# Patient Record
Sex: FEMALE | Race: WHITE | NOT HISPANIC OR LATINO | Employment: OTHER | URBAN - METROPOLITAN AREA
[De-identification: names, ages, dates, MRNs, and addresses within clinical notes are randomized per-mention and may not be internally consistent; named-entity substitution may affect disease eponyms.]

---

## 2017-04-20 ENCOUNTER — HISTORICAL (OUTPATIENT)
Dept: LAB | Facility: HOSPITAL | Age: 82
End: 2017-04-20

## 2017-04-26 ENCOUNTER — HISTORICAL (OUTPATIENT)
Dept: ADMINISTRATIVE | Facility: HOSPITAL | Age: 82
End: 2017-04-26

## 2017-07-17 ENCOUNTER — HISTORICAL (OUTPATIENT)
Dept: ADMINISTRATIVE | Facility: HOSPITAL | Age: 82
End: 2017-07-17

## 2017-07-17 LAB
ABS NEUT (OLG): 3.22 X10(3)/MCL (ref 2.1–9.2)
ALBUMIN SERPL-MCNC: 3.5 GM/DL (ref 3.4–5)
ALBUMIN/GLOB SERPL: 0.9 {RATIO}
ALP SERPL-CCNC: 102 UNIT/L (ref 38–126)
ALT SERPL-CCNC: 22 UNIT/L (ref 12–78)
AST SERPL-CCNC: 19 UNIT/L (ref 15–37)
BASOPHILS # BLD AUTO: 0.1 X10(3)/MCL (ref 0–0.2)
BASOPHILS NFR BLD AUTO: 1.1 %
BILIRUB SERPL-MCNC: 2 MG/DL (ref 0.2–1)
BILIRUBIN DIRECT+TOT PNL SERPL-MCNC: 0.4 MG/DL (ref 0–0.2)
BILIRUBIN DIRECT+TOT PNL SERPL-MCNC: 1.6 MG/DL (ref 0–0.8)
BUN SERPL-MCNC: 10 MG/DL (ref 7–18)
CALCIUM SERPL-MCNC: 10.1 MG/DL (ref 8.5–10.1)
CHLORIDE SERPL-SCNC: 107 MMOL/L (ref 98–107)
CO2 SERPL-SCNC: 29 MMOL/L (ref 21–32)
CREAT SERPL-MCNC: 0.8 MG/DL (ref 0.55–1.02)
EOSINOPHIL # BLD AUTO: 0.2 X10(3)/MCL (ref 0–0.9)
EOSINOPHIL NFR BLD AUTO: 4.4 %
ERYTHROCYTE [DISTWIDTH] IN BLOOD BY AUTOMATED COUNT: 14 % (ref 11.5–17)
GLOBULIN SER-MCNC: 4.1 GM/DL (ref 2.4–3.5)
GLUCOSE SERPL-MCNC: 106 MG/DL (ref 74–106)
HCT VFR BLD AUTO: 43.5 % (ref 37–47)
HGB BLD-MCNC: 14.4 GM/DL (ref 12–16)
LYMPHOCYTES # BLD AUTO: 1.4 X10(3)/MCL (ref 0.6–4.6)
LYMPHOCYTES NFR BLD AUTO: 25 %
MCH RBC QN AUTO: 30.8 PG (ref 27–31)
MCHC RBC AUTO-ENTMCNC: 33.1 GM/DL (ref 33–36)
MCV RBC AUTO: 93.1 FL (ref 80–94)
MONOCYTES # BLD AUTO: 0.5 X10(3)/MCL (ref 0.1–1.3)
MONOCYTES NFR BLD AUTO: 9.8 %
NEUTROPHILS # BLD AUTO: 3.2 X10(3)/MCL (ref 2.1–9.2)
NEUTROPHILS NFR BLD AUTO: 59.7 %
PLATELET # BLD AUTO: 182 X10(3)/MCL (ref 130–400)
PMV BLD AUTO: 10 FL (ref 9.4–12.4)
POTASSIUM SERPL-SCNC: 3.8 MMOL/L (ref 3.5–5.1)
PROT SERPL-MCNC: 7.6 GM/DL (ref 6.4–8.2)
RBC # BLD AUTO: 4.67 X10(6)/MCL (ref 4.2–5.4)
SODIUM SERPL-SCNC: 144 MMOL/L (ref 136–145)
WBC # SPEC AUTO: 5.4 X10(3)/MCL (ref 4.5–11.5)

## 2017-10-24 ENCOUNTER — HISTORICAL (OUTPATIENT)
Dept: ADMINISTRATIVE | Facility: HOSPITAL | Age: 82
End: 2017-10-24

## 2017-10-24 LAB
ABS NEUT (OLG): 2.97 X10(3)/MCL (ref 2.1–9.2)
ALBUMIN SERPL-MCNC: 3.7 GM/DL (ref 3.4–5)
ALBUMIN/GLOB SERPL: 0.9 RATIO (ref 1.1–2)
ALP SERPL-CCNC: 103 UNIT/L (ref 38–126)
ALT SERPL-CCNC: 20 UNIT/L (ref 12–78)
AST SERPL-CCNC: 23 UNIT/L (ref 15–37)
BASOPHILS # BLD AUTO: 0 X10(3)/MCL (ref 0–0.2)
BASOPHILS NFR BLD AUTO: 0.6 %
BILIRUB SERPL-MCNC: 2.2 MG/DL (ref 0.2–1)
BILIRUBIN DIRECT+TOT PNL SERPL-MCNC: 0.4 MG/DL (ref 0–0.5)
BILIRUBIN DIRECT+TOT PNL SERPL-MCNC: 1.8 MG/DL (ref 0–0.8)
BUN SERPL-MCNC: 12 MG/DL (ref 7–18)
CALCIUM SERPL-MCNC: 10.4 MG/DL (ref 8.5–10.1)
CHLORIDE SERPL-SCNC: 105 MMOL/L (ref 98–107)
CO2 SERPL-SCNC: 33 MMOL/L (ref 21–32)
CREAT SERPL-MCNC: 0.75 MG/DL (ref 0.55–1.02)
EOSINOPHIL # BLD AUTO: 0.2 X10(3)/MCL (ref 0–0.9)
EOSINOPHIL NFR BLD AUTO: 3.6 %
ERYTHROCYTE [DISTWIDTH] IN BLOOD BY AUTOMATED COUNT: 13.8 % (ref 11.5–17)
GLOBULIN SER-MCNC: 4 GM/DL (ref 2.4–3.5)
GLUCOSE SERPL-MCNC: 93 MG/DL (ref 74–106)
HCT VFR BLD AUTO: 43.5 % (ref 37–47)
HGB BLD-MCNC: 14.8 GM/DL (ref 12–16)
LYMPHOCYTES # BLD AUTO: 1.6 X10(3)/MCL (ref 0.6–4.6)
LYMPHOCYTES NFR BLD AUTO: 29.4 %
MCH RBC QN AUTO: 31.2 PG (ref 27–31)
MCHC RBC AUTO-ENTMCNC: 34 GM/DL (ref 33–36)
MCV RBC AUTO: 91.8 FL (ref 80–94)
MONOCYTES # BLD AUTO: 0.5 X10(3)/MCL (ref 0.1–1.3)
MONOCYTES NFR BLD AUTO: 10.2 %
NEUTROPHILS # BLD AUTO: 3 X10(3)/MCL (ref 2.1–9.2)
NEUTROPHILS NFR BLD AUTO: 56.2 %
PLATELET # BLD AUTO: 186 X10(3)/MCL (ref 130–400)
PMV BLD AUTO: 10.2 FL (ref 9.4–12.4)
POTASSIUM SERPL-SCNC: 3.8 MMOL/L (ref 3.5–5.1)
PROT SERPL-MCNC: 7.7 GM/DL (ref 6.4–8.2)
RBC # BLD AUTO: 4.74 X10(6)/MCL (ref 4.2–5.4)
SODIUM SERPL-SCNC: 142 MMOL/L (ref 136–145)
WBC # SPEC AUTO: 5.3 X10(3)/MCL (ref 4.5–11.5)

## 2017-11-06 ENCOUNTER — HISTORICAL (OUTPATIENT)
Dept: INFUSION THERAPY | Facility: HOSPITAL | Age: 82
End: 2017-11-06

## 2018-01-24 ENCOUNTER — HISTORICAL (OUTPATIENT)
Dept: ADMINISTRATIVE | Facility: HOSPITAL | Age: 83
End: 2018-01-24

## 2018-01-24 LAB
ABS NEUT (OLG): 2.24 X10(3)/MCL (ref 2.1–9.2)
ALBUMIN SERPL-MCNC: 3.3 GM/DL (ref 3.4–5)
ALP SERPL-CCNC: 76 UNIT/L (ref 38–126)
ALT SERPL-CCNC: 18 UNIT/L (ref 12–78)
ANION GAP SERPL CALC-SCNC: 17 MMOL/L
AST SERPL-CCNC: 22 UNIT/L (ref 15–37)
BASOPHILS # BLD AUTO: 0 X10(3)/MCL (ref 0–0.2)
BASOPHILS NFR BLD AUTO: 0.5 %
BILIRUB SERPL-MCNC: 1.9 MG/DL (ref 0.2–1)
BILIRUBIN DIRECT+TOT PNL SERPL-MCNC: 0.4 MG/DL (ref 0–0.2)
BILIRUBIN DIRECT+TOT PNL SERPL-MCNC: 1.5 MG/DL (ref 0–0.8)
BUN SERPL-MCNC: 10 MG/DL (ref 7–18)
CHLORIDE SERPL-SCNC: 103 MMOL/L (ref 98–109)
CREAT SERPL-MCNC: 1 MG/DL (ref 0.6–1.3)
EOSINOPHIL # BLD AUTO: 0.2 X10(3)/MCL (ref 0–0.9)
EOSINOPHIL NFR BLD AUTO: 5 %
ERYTHROCYTE [DISTWIDTH] IN BLOOD BY AUTOMATED COUNT: 13.5 % (ref 11.5–17)
GLUCOSE SERPL-MCNC: 103 MG/DL (ref 70–105)
HCT VFR BLD AUTO: 40.6 % (ref 37–47)
HCT VFR BLD CALC: 37 % (ref 38–51)
HGB BLD-MCNC: 12.6 MG/DL (ref 12–17)
HGB BLD-MCNC: 13.5 GM/DL (ref 12–16)
LIVER PROFILE INTERP: ABNORMAL
LYMPHOCYTES # BLD AUTO: 1.3 X10(3)/MCL (ref 0.6–4.6)
LYMPHOCYTES NFR BLD AUTO: 30.4 %
MCH RBC QN AUTO: 31.1 PG (ref 27–31)
MCHC RBC AUTO-ENTMCNC: 33.3 GM/DL (ref 33–36)
MCV RBC AUTO: 93.5 FL (ref 80–94)
MONOCYTES # BLD AUTO: 0.6 X10(3)/MCL (ref 0.1–1.3)
MONOCYTES NFR BLD AUTO: 13 %
NEUTROPHILS # BLD AUTO: 2.2 X10(3)/MCL (ref 2.1–9.2)
NEUTROPHILS NFR BLD AUTO: 51.1 %
PLATELET # BLD AUTO: 182 X10(3)/MCL (ref 130–400)
PMV BLD AUTO: 9.9 FL (ref 9.4–12.4)
POC IONIZED CALCIUM: 1.41 MMOL/L (ref 1.12–1.32)
POC TCO2: 31 MMOL/L (ref 22–27)
POTASSIUM BLD-SCNC: 3.7 MMOL/L (ref 3.5–4.9)
PROT SERPL-MCNC: 7.2 GM/DL (ref 6.4–8.2)
RBC # BLD AUTO: 4.34 X10(6)/MCL (ref 4.2–5.4)
SODIUM BLD-SCNC: 146 MMOL/L (ref 138–146)
WBC # SPEC AUTO: 4.4 X10(3)/MCL (ref 4.5–11.5)

## 2018-04-24 ENCOUNTER — HISTORICAL (OUTPATIENT)
Dept: ADMINISTRATIVE | Facility: HOSPITAL | Age: 83
End: 2018-04-24

## 2018-04-24 LAB
ABS NEUT (OLG): 2.04 X10(3)/MCL (ref 2.1–9.2)
ALBUMIN SERPL-MCNC: 3.5 GM/DL (ref 3.4–5)
ALBUMIN/GLOB SERPL: 0.9 {RATIO}
ALP SERPL-CCNC: 92 UNIT/L (ref 38–126)
ALT SERPL-CCNC: 25 UNIT/L (ref 12–78)
AST SERPL-CCNC: 36 UNIT/L (ref 15–37)
BASOPHILS # BLD AUTO: 0 X10(3)/MCL (ref 0–0.2)
BASOPHILS NFR BLD AUTO: 1.3 %
BASOPHILS NFR BLD MANUAL: 1 % (ref 0–2)
BILIRUB SERPL-MCNC: 2.2 MG/DL (ref 0.2–1)
BILIRUBIN DIRECT+TOT PNL SERPL-MCNC: 0.4 MG/DL (ref 0–0.2)
BILIRUBIN DIRECT+TOT PNL SERPL-MCNC: 1.8 MG/DL (ref 0–0.8)
BUN SERPL-MCNC: 8 MG/DL (ref 7–18)
CALCIUM SERPL-MCNC: 10.3 MG/DL (ref 8.5–10.1)
CHLORIDE SERPL-SCNC: 109 MMOL/L (ref 98–107)
CO2 SERPL-SCNC: 30 MMOL/L (ref 21–32)
CREAT SERPL-MCNC: 0.72 MG/DL (ref 0.55–1.02)
EOSINOPHIL # BLD AUTO: 0.2 X10(3)/MCL (ref 0–0.9)
EOSINOPHIL NFR BLD AUTO: 3.9 %
EOSINOPHIL NFR BLD MANUAL: 2 % (ref 0–8)
ERYTHROCYTE [DISTWIDTH] IN BLOOD BY AUTOMATED COUNT: 14.6 % (ref 11.5–17)
GLOBULIN SER-MCNC: 3.8 GM/DL (ref 2.4–3.5)
GLUCOSE SERPL-MCNC: 95 MG/DL (ref 74–106)
HCT VFR BLD AUTO: 39.6 % (ref 37–47)
HGB BLD-MCNC: 13.3 GM/DL (ref 12–16)
LYMPHOCYTES # BLD AUTO: 1.2 X10(3)/MCL (ref 0.6–4.6)
LYMPHOCYTES NFR BLD AUTO: 31.4 %
LYMPHOCYTES NFR BLD MANUAL: 27 % (ref 13–40)
MCH RBC QN AUTO: 31.1 PG (ref 27–31)
MCHC RBC AUTO-ENTMCNC: 33.6 GM/DL (ref 33–36)
MCV RBC AUTO: 92.5 FL (ref 80–94)
MONOCYTES # BLD AUTO: 0.4 X10(3)/MCL (ref 0.1–1.3)
MONOCYTES NFR BLD AUTO: 10.8 %
MONOCYTES NFR BLD MANUAL: 10 % (ref 2–11)
NEUTROPHILS # BLD AUTO: 2 X10(3)/MCL (ref 2.1–9.2)
NEUTROPHILS NFR BLD AUTO: 52.6 %
NEUTROPHILS NFR BLD MANUAL: 60 % (ref 47–80)
PLATELET # BLD AUTO: 167 X10(3)/MCL (ref 130–400)
PLATELET # BLD EST: NORMAL 10*3/UL
PMV BLD AUTO: 9.7 FL (ref 9.4–12.4)
POTASSIUM SERPL-SCNC: 3.7 MMOL/L (ref 3.5–5.1)
PROT SERPL-MCNC: 7.3 GM/DL (ref 6.4–8.2)
RBC # BLD AUTO: 4.28 X10(6)/MCL (ref 4.2–5.4)
RBC MORPH BLD: NORMAL
SODIUM SERPL-SCNC: 142 MMOL/L (ref 136–145)
WBC # SPEC AUTO: 3.9 X10(3)/MCL (ref 4.5–11.5)

## 2018-05-11 ENCOUNTER — HISTORICAL (OUTPATIENT)
Dept: INFUSION THERAPY | Facility: HOSPITAL | Age: 83
End: 2018-05-11

## 2018-05-22 ENCOUNTER — HISTORICAL (OUTPATIENT)
Dept: RADIOLOGY | Facility: HOSPITAL | Age: 83
End: 2018-05-22

## 2018-07-09 ENCOUNTER — HISTORICAL (OUTPATIENT)
Dept: ADMINISTRATIVE | Facility: HOSPITAL | Age: 83
End: 2018-07-09

## 2018-07-09 LAB
ABS NEUT (OLG): 2.9 X10(3)/MCL (ref 2.1–9.2)
ALBUMIN SERPL-MCNC: 3.6 GM/DL (ref 3.4–5)
ALP SERPL-CCNC: 78 UNIT/L (ref 38–126)
ALT SERPL-CCNC: 20 UNIT/L (ref 12–78)
ANION GAP SERPL CALC-SCNC: 17 MMOL/L
AST SERPL-CCNC: 24 UNIT/L (ref 15–37)
BASOPHILS # BLD AUTO: 0 X10(3)/MCL (ref 0–0.2)
BASOPHILS NFR BLD AUTO: 0.8 %
BILIRUB SERPL-MCNC: 2.3 MG/DL (ref 0.2–1)
BILIRUBIN DIRECT+TOT PNL SERPL-MCNC: 0.4 MG/DL (ref 0–0.2)
BILIRUBIN DIRECT+TOT PNL SERPL-MCNC: 1.9 MG/DL (ref 0–0.8)
BUN SERPL-MCNC: 13 MG/DL (ref 8–26)
CHLORIDE SERPL-SCNC: 105 MMOL/L (ref 98–109)
CREAT SERPL-MCNC: 0.8 MG/DL (ref 0.6–1.3)
EOSINOPHIL # BLD AUTO: 0.1 X10(3)/MCL (ref 0–0.9)
EOSINOPHIL NFR BLD AUTO: 2.6 %
ERYTHROCYTE [DISTWIDTH] IN BLOOD BY AUTOMATED COUNT: 14.1 % (ref 11.5–17)
GLUCOSE SERPL-MCNC: 99 MG/DL (ref 70–105)
HCT VFR BLD AUTO: 42.7 % (ref 37–47)
HCT VFR BLD CALC: 41 % (ref 38–51)
HGB BLD-MCNC: 13.9 MG/DL (ref 12–17)
HGB BLD-MCNC: 14.3 GM/DL (ref 12–16)
LIVER PROFILE INTERP: ABNORMAL
LYMPHOCYTES # BLD AUTO: 1.5 X10(3)/MCL (ref 0.6–4.6)
LYMPHOCYTES NFR BLD AUTO: 29.3 %
MCH RBC QN AUTO: 30.7 PG (ref 27–31)
MCHC RBC AUTO-ENTMCNC: 33.5 GM/DL (ref 33–36)
MCV RBC AUTO: 91.6 FL (ref 80–94)
MONOCYTES # BLD AUTO: 0.5 X10(3)/MCL (ref 0.1–1.3)
MONOCYTES NFR BLD AUTO: 9.4 %
NEUTROPHILS # BLD AUTO: 2.9 X10(3)/MCL (ref 2.1–9.2)
NEUTROPHILS NFR BLD AUTO: 57.9 %
PLATELET # BLD AUTO: 181 X10(3)/MCL (ref 130–400)
PMV BLD AUTO: 10 FL (ref 9.4–12.4)
POC IONIZED CALCIUM: 1.35 MMOL/L (ref 1.12–1.32)
POC TCO2: 29 MMOL/L (ref 24–29)
POTASSIUM BLD-SCNC: 4.4 MMOL/L (ref 3.5–4.9)
PROT SERPL-MCNC: 7.8 GM/DL (ref 6.4–8.2)
PTH-INTACT SERPL-MCNC: 160.5 PG/ML (ref 18.4–80.1)
RBC # BLD AUTO: 4.66 X10(6)/MCL (ref 4.2–5.4)
SODIUM BLD-SCNC: 145 MMOL/L (ref 138–146)
WBC # SPEC AUTO: 5 X10(3)/MCL (ref 4.5–11.5)

## 2018-10-09 ENCOUNTER — HISTORICAL (OUTPATIENT)
Dept: ADMINISTRATIVE | Facility: HOSPITAL | Age: 83
End: 2018-10-09

## 2018-10-09 LAB
ABS NEUT (OLG): 3.11 X10(3)/MCL (ref 2.1–9.2)
ALBUMIN SERPL-MCNC: 3.7 GM/DL (ref 3.4–5)
ALP SERPL-CCNC: 83 UNIT/L (ref 38–126)
ALT SERPL-CCNC: 24 UNIT/L (ref 12–78)
ANION GAP SERPL CALC-SCNC: 17 MMOL/L
AST SERPL-CCNC: 25 UNIT/L (ref 15–37)
BASOPHILS # BLD AUTO: 0 X10(3)/MCL (ref 0–0.2)
BASOPHILS NFR BLD AUTO: 0.8 %
BILIRUB SERPL-MCNC: 1.9 MG/DL (ref 0.2–1)
BILIRUBIN DIRECT+TOT PNL SERPL-MCNC: 0.4 MG/DL (ref 0–0.5)
BILIRUBIN DIRECT+TOT PNL SERPL-MCNC: 1.5 MG/DL (ref 0–0.8)
BUN SERPL-MCNC: 17 MG/DL (ref 8–26)
CHLORIDE SERPL-SCNC: 105 MMOL/L (ref 98–109)
CREAT SERPL-MCNC: 0.9 MG/DL (ref 0.6–1.3)
EOSINOPHIL # BLD AUTO: 0.2 X10(3)/MCL (ref 0–0.9)
EOSINOPHIL NFR BLD AUTO: 3.3 %
ERYTHROCYTE [DISTWIDTH] IN BLOOD BY AUTOMATED COUNT: 13.4 % (ref 11.5–17)
GLUCOSE SERPL-MCNC: 98 MG/DL (ref 70–105)
HCT VFR BLD AUTO: 40.7 % (ref 37–47)
HCT VFR BLD CALC: 40 % (ref 38–51)
HGB BLD-MCNC: 13.5 GM/DL (ref 12–16)
HGB BLD-MCNC: 13.6 MG/DL (ref 12–17)
LIVER PROFILE INTERP: ABNORMAL
LYMPHOCYTES # BLD AUTO: 1.3 X10(3)/MCL (ref 0.6–4.6)
LYMPHOCYTES NFR BLD AUTO: 26.3 %
MCH RBC QN AUTO: 31.1 PG (ref 27–31)
MCHC RBC AUTO-ENTMCNC: 33.2 GM/DL (ref 33–36)
MCV RBC AUTO: 93.8 FL (ref 80–94)
MONOCYTES # BLD AUTO: 0.4 X10(3)/MCL (ref 0.1–1.3)
MONOCYTES NFR BLD AUTO: 8.6 %
NEUTROPHILS # BLD AUTO: 3.1 X10(3)/MCL (ref 2.1–9.2)
NEUTROPHILS NFR BLD AUTO: 61 %
PLATELET # BLD AUTO: 176 X10(3)/MCL (ref 130–400)
PMV BLD AUTO: 10.1 FL (ref 9.4–12.4)
POC IONIZED CALCIUM: 1.47 MMOL/L (ref 1.12–1.32)
POC TCO2: 28 MMOL/L (ref 24–29)
POTASSIUM BLD-SCNC: 4.3 MMOL/L (ref 3.5–4.9)
PROT SERPL-MCNC: 7.7 GM/DL (ref 6.4–8.2)
RBC # BLD AUTO: 4.34 X10(6)/MCL (ref 4.2–5.4)
SODIUM BLD-SCNC: 144 MMOL/L (ref 138–146)
WBC # SPEC AUTO: 5.1 X10(3)/MCL (ref 4.5–11.5)

## 2018-11-12 ENCOUNTER — HISTORICAL (OUTPATIENT)
Dept: INFUSION THERAPY | Facility: HOSPITAL | Age: 83
End: 2018-11-12

## 2019-01-09 ENCOUNTER — HISTORICAL (OUTPATIENT)
Dept: ADMINISTRATIVE | Facility: HOSPITAL | Age: 84
End: 2019-01-09

## 2019-01-09 LAB
ABS NEUT (OLG): 2.51 X10(3)/MCL (ref 2.1–9.2)
ALBUMIN SERPL-MCNC: 3.7 GM/DL (ref 3.4–5)
ALBUMIN/GLOB SERPL: 1 {RATIO}
ALP SERPL-CCNC: 75 UNIT/L (ref 38–126)
ALT SERPL-CCNC: 22 UNIT/L (ref 12–78)
AST SERPL-CCNC: 25 UNIT/L (ref 15–37)
BASOPHILS # BLD AUTO: 0 X10(3)/MCL (ref 0–0.2)
BASOPHILS NFR BLD AUTO: 0.8 %
BILIRUB SERPL-MCNC: 1.7 MG/DL (ref 0.2–1)
BILIRUBIN DIRECT+TOT PNL SERPL-MCNC: 0.4 MG/DL (ref 0–0.2)
BILIRUBIN DIRECT+TOT PNL SERPL-MCNC: 1.3 MG/DL (ref 0–0.8)
BUN SERPL-MCNC: 11 MG/DL (ref 7–18)
CALCIUM SERPL-MCNC: 10 MG/DL (ref 8.5–10.1)
CHLORIDE SERPL-SCNC: 106 MMOL/L (ref 98–107)
CO2 SERPL-SCNC: 30 MMOL/L (ref 21–32)
CREAT SERPL-MCNC: 0.87 MG/DL (ref 0.55–1.02)
EOSINOPHIL # BLD AUTO: 0.2 X10(3)/MCL (ref 0–0.9)
EOSINOPHIL NFR BLD AUTO: 4.1 %
ERYTHROCYTE [DISTWIDTH] IN BLOOD BY AUTOMATED COUNT: 12.8 % (ref 11.5–17)
GLOBULIN SER-MCNC: 3.8 GM/DL (ref 2.4–3.5)
GLUCOSE SERPL-MCNC: 94 MG/DL (ref 74–106)
HCT VFR BLD AUTO: 42.8 % (ref 37–47)
HGB BLD-MCNC: 13.7 GM/DL (ref 12–16)
LYMPHOCYTES # BLD AUTO: 1.6 X10(3)/MCL (ref 0.6–4.6)
LYMPHOCYTES NFR BLD AUTO: 33.6 %
MCH RBC QN AUTO: 30.9 PG (ref 27–31)
MCHC RBC AUTO-ENTMCNC: 32 GM/DL (ref 33–36)
MCV RBC AUTO: 96.6 FL (ref 80–94)
MONOCYTES # BLD AUTO: 0.5 X10(3)/MCL (ref 0.1–1.3)
MONOCYTES NFR BLD AUTO: 10.2 %
NEUTROPHILS # BLD AUTO: 2.5 X10(3)/MCL (ref 2.1–9.2)
NEUTROPHILS NFR BLD AUTO: 51.1 %
PLATELET # BLD AUTO: 144 X10(3)/MCL (ref 130–400)
PMV BLD AUTO: 10.3 FL (ref 9.4–12.4)
POTASSIUM SERPL-SCNC: 4.9 MMOL/L (ref 3.5–5.1)
PROT SERPL-MCNC: 7.5 GM/DL (ref 6.4–8.2)
RBC # BLD AUTO: 4.43 X10(6)/MCL (ref 4.2–5.4)
SODIUM SERPL-SCNC: 142 MMOL/L (ref 136–145)
WBC # SPEC AUTO: 4.9 X10(3)/MCL (ref 4.5–11.5)

## 2019-02-15 ENCOUNTER — HISTORICAL (OUTPATIENT)
Dept: RADIOLOGY | Facility: HOSPITAL | Age: 84
End: 2019-02-15

## 2019-02-15 LAB — POC CREATININE: 0.8 MG/DL (ref 0.6–1.3)

## 2019-02-19 ENCOUNTER — HISTORICAL (OUTPATIENT)
Dept: CARDIOLOGY | Facility: HOSPITAL | Age: 84
End: 2019-02-19

## 2019-02-19 LAB
ABS NEUT (OLG): 3.03 X10(3)/MCL (ref 2.1–9.2)
BASOPHILS # BLD AUTO: 0 X10(3)/MCL (ref 0–0.2)
BASOPHILS NFR BLD AUTO: 1 %
EOSINOPHIL # BLD AUTO: 0.2 X10(3)/MCL (ref 0–0.9)
EOSINOPHIL NFR BLD AUTO: 4 %
ERYTHROCYTE [DISTWIDTH] IN BLOOD BY AUTOMATED COUNT: 13.2 % (ref 11.5–17)
HCT VFR BLD AUTO: 43.2 % (ref 37–47)
HGB BLD-MCNC: 13.9 GM/DL (ref 12–16)
INR PPP: 1.1 (ref 0–1.3)
LYMPHOCYTES # BLD AUTO: 1.2 X10(3)/MCL (ref 0.6–4.6)
LYMPHOCYTES NFR BLD AUTO: 25 %
MCH RBC QN AUTO: 30.5 PG (ref 27–31)
MCHC RBC AUTO-ENTMCNC: 32.2 GM/DL (ref 33–36)
MCV RBC AUTO: 94.9 FL (ref 80–94)
MONOCYTES # BLD AUTO: 0.4 X10(3)/MCL (ref 0.1–1.3)
MONOCYTES NFR BLD AUTO: 9 %
NEUTROPHILS # BLD AUTO: 3.03 X10(3)/MCL (ref 2.1–9.2)
NEUTROPHILS NFR BLD AUTO: 61 %
PLATELET # BLD AUTO: 182 X10(3)/MCL (ref 130–400)
PMV BLD AUTO: 9.8 FL (ref 9.4–12.4)
PROTHROMBIN TIME: 14.5 SECOND(S) (ref 12.2–14.7)
RBC # BLD AUTO: 4.55 X10(6)/MCL (ref 4.2–5.4)
WBC # SPEC AUTO: 5 X10(3)/MCL (ref 4.5–11.5)

## 2019-04-09 ENCOUNTER — HISTORICAL (OUTPATIENT)
Dept: ADMINISTRATIVE | Facility: HOSPITAL | Age: 84
End: 2019-04-09

## 2019-04-09 LAB
ABS NEUT (OLG): 3.07 X10(3)/MCL (ref 2.1–9.2)
ALBUMIN SERPL-MCNC: 3.5 GM/DL (ref 3.4–5)
ALP SERPL-CCNC: 86 UNIT/L (ref 38–126)
ALT SERPL-CCNC: 18 UNIT/L (ref 12–78)
ANION GAP SERPL CALC-SCNC: 16 MMOL/L
AST SERPL-CCNC: 26 UNIT/L (ref 15–37)
BASOPHILS # BLD AUTO: 0 X10(3)/MCL (ref 0–0.2)
BASOPHILS NFR BLD AUTO: 0.6 %
BILIRUB SERPL-MCNC: 1.8 MG/DL (ref 0.2–1)
BILIRUBIN DIRECT+TOT PNL SERPL-MCNC: 0.3 MG/DL (ref 0–0.5)
BILIRUBIN DIRECT+TOT PNL SERPL-MCNC: 1.5 MG/DL (ref 0–0.8)
BUN SERPL-MCNC: 9 MG/DL (ref 8–26)
CHLORIDE SERPL-SCNC: 105 MMOL/L (ref 98–109)
CREAT SERPL-MCNC: 0.7 MG/DL (ref 0.6–1.3)
EOSINOPHIL # BLD AUTO: 0.1 X10(3)/MCL (ref 0–0.9)
EOSINOPHIL NFR BLD AUTO: 2.3 %
ERYTHROCYTE [DISTWIDTH] IN BLOOD BY AUTOMATED COUNT: 13.1 % (ref 11.5–17)
GLUCOSE SERPL-MCNC: 91 MG/DL (ref 70–105)
HCT VFR BLD AUTO: 40.2 % (ref 37–47)
HCT VFR BLD CALC: 36 % (ref 38–51)
HGB BLD-MCNC: 12.2 MG/DL (ref 12–17)
HGB BLD-MCNC: 13.2 GM/DL (ref 12–16)
LIVER PROFILE INTERP: ABNORMAL
LYMPHOCYTES # BLD AUTO: 1.5 X10(3)/MCL (ref 0.6–4.6)
LYMPHOCYTES NFR BLD AUTO: 28.1 %
MCH RBC QN AUTO: 30.5 PG (ref 27–31)
MCHC RBC AUTO-ENTMCNC: 32.8 GM/DL (ref 33–36)
MCV RBC AUTO: 92.8 FL (ref 80–94)
MONOCYTES # BLD AUTO: 0.5 X10(3)/MCL (ref 0.1–1.3)
MONOCYTES NFR BLD AUTO: 10.3 %
NEUTROPHILS # BLD AUTO: 3.1 X10(3)/MCL (ref 2.1–9.2)
NEUTROPHILS NFR BLD AUTO: 58.5 %
PLATELET # BLD AUTO: 170 X10(3)/MCL (ref 130–400)
PMV BLD AUTO: 9.1 FL (ref 9.4–12.4)
POC IONIZED CALCIUM: 1.29 MMOL/L (ref 1.12–1.32)
POC TCO2: 31 MMOL/L (ref 24–29)
POTASSIUM BLD-SCNC: 4.1 MMOL/L (ref 3.5–4.9)
PROT SERPL-MCNC: 7.2 GM/DL (ref 6.4–8.2)
RBC # BLD AUTO: 4.33 X10(6)/MCL (ref 4.2–5.4)
SODIUM BLD-SCNC: 147 MMOL/L (ref 138–146)
WBC # SPEC AUTO: 5.2 X10(3)/MCL (ref 4.5–11.5)

## 2019-05-20 ENCOUNTER — HISTORICAL (OUTPATIENT)
Dept: INFUSION THERAPY | Facility: HOSPITAL | Age: 84
End: 2019-05-20

## 2019-07-02 ENCOUNTER — HISTORICAL (OUTPATIENT)
Dept: HEMATOLOGY/ONCOLOGY | Facility: CLINIC | Age: 84
End: 2019-07-02

## 2019-07-02 LAB
ABS NEUT (OLG): 2.99 X10(3)/MCL (ref 2.1–9.2)
ALBUMIN SERPL-MCNC: 3.8 GM/DL (ref 3.4–5)
ALBUMIN/GLOB SERPL: 1 {RATIO}
ALP SERPL-CCNC: 92 UNIT/L (ref 38–126)
ALT SERPL-CCNC: 20 UNIT/L (ref 12–78)
AST SERPL-CCNC: 27 UNIT/L (ref 15–37)
BASOPHILS # BLD AUTO: 0 X10(3)/MCL (ref 0–0.2)
BASOPHILS NFR BLD AUTO: 0.8 %
BILIRUB SERPL-MCNC: 1.9 MG/DL (ref 0.2–1)
BILIRUBIN DIRECT+TOT PNL SERPL-MCNC: 0.4 MG/DL (ref 0–0.2)
BILIRUBIN DIRECT+TOT PNL SERPL-MCNC: 1.5 MG/DL (ref 0–0.8)
BUN SERPL-MCNC: 12 MG/DL (ref 7–18)
CALCIUM SERPL-MCNC: 8.6 MG/DL (ref 8.5–10.1)
CHLORIDE SERPL-SCNC: 106 MMOL/L (ref 98–107)
CO2 SERPL-SCNC: 27 MMOL/L (ref 21–32)
CREAT SERPL-MCNC: 0.79 MG/DL (ref 0.55–1.02)
EOSINOPHIL # BLD AUTO: 0.1 X10(3)/MCL (ref 0–0.9)
EOSINOPHIL NFR BLD AUTO: 1.9 %
ERYTHROCYTE [DISTWIDTH] IN BLOOD BY AUTOMATED COUNT: 13.4 % (ref 11.5–17)
GLOBULIN SER-MCNC: 3.7 GM/DL (ref 2.4–3.5)
GLUCOSE SERPL-MCNC: 97 MG/DL (ref 74–106)
HCT VFR BLD AUTO: 39.9 % (ref 37–47)
HGB BLD-MCNC: 13.2 GM/DL (ref 12–16)
LYMPHOCYTES # BLD AUTO: 1.2 X10(3)/MCL (ref 0.6–4.6)
LYMPHOCYTES NFR BLD AUTO: 25.1 %
MCH RBC QN AUTO: 30.7 PG (ref 27–31)
MCHC RBC AUTO-ENTMCNC: 33.1 GM/DL (ref 33–36)
MCV RBC AUTO: 92.8 FL (ref 80–94)
MONOCYTES # BLD AUTO: 0.4 X10(3)/MCL (ref 0.1–1.3)
MONOCYTES NFR BLD AUTO: 9.2 %
NEUTROPHILS # BLD AUTO: 3 X10(3)/MCL (ref 2.1–9.2)
NEUTROPHILS NFR BLD AUTO: 62.6 %
PLATELET # BLD AUTO: 186 X10(3)/MCL (ref 130–400)
PMV BLD AUTO: 9.1 FL (ref 9.4–12.4)
POC CREATININE: 0.7 MG/DL (ref 0.6–1.3)
POTASSIUM SERPL-SCNC: 3.7 MMOL/L (ref 3.5–5.1)
PROT SERPL-MCNC: 7.5 GM/DL (ref 6.4–8.2)
RBC # BLD AUTO: 4.3 X10(6)/MCL (ref 4.2–5.4)
SODIUM SERPL-SCNC: 141 MMOL/L (ref 136–145)
WBC # SPEC AUTO: 4.8 X10(3)/MCL (ref 4.5–11.5)

## 2019-09-27 ENCOUNTER — HISTORICAL (OUTPATIENT)
Dept: RADIOLOGY | Facility: HOSPITAL | Age: 84
End: 2019-09-27

## 2019-11-25 ENCOUNTER — HISTORICAL (OUTPATIENT)
Dept: INFUSION THERAPY | Facility: HOSPITAL | Age: 84
End: 2019-11-25

## 2020-02-14 ENCOUNTER — HISTORICAL (OUTPATIENT)
Dept: HEMATOLOGY/ONCOLOGY | Facility: CLINIC | Age: 85
End: 2020-02-14

## 2020-02-14 LAB
ABS NEUT (OLG): 4.15 X10(3)/MCL (ref 2.1–9.2)
ALBUMIN SERPL-MCNC: 3.5 GM/DL (ref 3.4–5)
ALBUMIN/GLOB SERPL: 0.9 {RATIO}
ALP SERPL-CCNC: 91 UNIT/L (ref 38–126)
ALT SERPL-CCNC: 23 UNIT/L (ref 12–78)
AST SERPL-CCNC: 27 UNIT/L (ref 15–37)
BASOPHILS # BLD AUTO: 0 X10(3)/MCL (ref 0–0.2)
BASOPHILS NFR BLD AUTO: 0.5 %
BILIRUB SERPL-MCNC: 2.7 MG/DL (ref 0.2–1)
BILIRUBIN DIRECT+TOT PNL SERPL-MCNC: 0.4 MG/DL (ref 0–0.2)
BILIRUBIN DIRECT+TOT PNL SERPL-MCNC: 2.3 MG/DL (ref 0–0.8)
BUN SERPL-MCNC: 11 MG/DL (ref 7–18)
CALCIUM SERPL-MCNC: 8.6 MG/DL (ref 8.5–10.1)
CHLORIDE SERPL-SCNC: 104 MMOL/L (ref 98–107)
CO2 SERPL-SCNC: 34 MMOL/L (ref 21–32)
CREAT SERPL-MCNC: 0.73 MG/DL (ref 0.55–1.02)
EOSINOPHIL # BLD AUTO: 0.1 X10(3)/MCL (ref 0–0.9)
EOSINOPHIL NFR BLD AUTO: 1.8 %
ERYTHROCYTE [DISTWIDTH] IN BLOOD BY AUTOMATED COUNT: 13.9 % (ref 11.5–17)
GLOBULIN SER-MCNC: 3.7 GM/DL (ref 2.4–3.5)
GLUCOSE SERPL-MCNC: 101 MG/DL (ref 74–106)
HCT VFR BLD AUTO: 40 % (ref 37–47)
HGB BLD-MCNC: 12.8 GM/DL (ref 12–16)
LYMPHOCYTES # BLD AUTO: 0.9 X10(3)/MCL (ref 0.6–4.6)
LYMPHOCYTES NFR BLD AUTO: 15.5 %
MCH RBC QN AUTO: 29.6 PG (ref 27–31)
MCHC RBC AUTO-ENTMCNC: 32 GM/DL (ref 33–36)
MCV RBC AUTO: 92.4 FL (ref 80–94)
MONOCYTES # BLD AUTO: 0.4 X10(3)/MCL (ref 0.1–1.3)
MONOCYTES NFR BLD AUTO: 8 %
NEUTROPHILS # BLD AUTO: 4.2 X10(3)/MCL (ref 2.1–9.2)
NEUTROPHILS NFR BLD AUTO: 74 %
PLATELET # BLD AUTO: 161 X10(3)/MCL (ref 130–400)
PMV BLD AUTO: 9.4 FL (ref 9.4–12.4)
POTASSIUM SERPL-SCNC: 3.6 MMOL/L (ref 3.5–5.1)
PROT SERPL-MCNC: 7.2 GM/DL (ref 6.4–8.2)
RBC # BLD AUTO: 4.33 X10(6)/MCL (ref 4.2–5.4)
SODIUM SERPL-SCNC: 141 MMOL/L (ref 136–145)
WBC # SPEC AUTO: 5.6 X10(3)/MCL (ref 4.5–11.5)

## 2020-05-13 ENCOUNTER — HISTORICAL (OUTPATIENT)
Dept: RADIOLOGY | Facility: HOSPITAL | Age: 85
End: 2020-05-13

## 2020-05-27 ENCOUNTER — HISTORICAL (OUTPATIENT)
Dept: INFUSION THERAPY | Facility: HOSPITAL | Age: 85
End: 2020-05-27

## 2020-08-14 ENCOUNTER — HISTORICAL (OUTPATIENT)
Dept: HEMATOLOGY/ONCOLOGY | Facility: CLINIC | Age: 85
End: 2020-08-14

## 2020-08-14 LAB
ABS NEUT (OLG): 3.62 X10(3)/MCL (ref 2.1–9.2)
ALBUMIN SERPL-MCNC: 3.7 GM/DL (ref 3.4–5)
ALBUMIN/GLOB SERPL: 1.1 RATIO (ref 1.1–2)
ALP SERPL-CCNC: 84 UNIT/L (ref 40–150)
ALT SERPL-CCNC: 13 UNIT/L (ref 0–55)
AST SERPL-CCNC: 20 UNIT/L (ref 5–34)
BASOPHILS # BLD AUTO: 0 X10(3)/MCL (ref 0–0.2)
BASOPHILS NFR BLD AUTO: 0.7 %
BILIRUB SERPL-MCNC: 2.2 MG/DL
BILIRUBIN DIRECT+TOT PNL SERPL-MCNC: 0.8 MG/DL (ref 0–0.5)
BILIRUBIN DIRECT+TOT PNL SERPL-MCNC: 1.4 MG/DL (ref 0–0.8)
BUN SERPL-MCNC: 9.6 MG/DL (ref 9.8–20.1)
CALCIUM SERPL-MCNC: 8.5 MG/DL (ref 8.4–10.2)
CHLORIDE SERPL-SCNC: 105 MMOL/L (ref 98–107)
CO2 SERPL-SCNC: 33 MMOL/L (ref 23–31)
CREAT SERPL-MCNC: 0.83 MG/DL (ref 0.55–1.02)
EOSINOPHIL # BLD AUTO: 0.2 X10(3)/MCL (ref 0–0.9)
EOSINOPHIL NFR BLD AUTO: 3.1 %
ERYTHROCYTE [DISTWIDTH] IN BLOOD BY AUTOMATED COUNT: 12.9 % (ref 11.5–17)
GLOBULIN SER-MCNC: 3.5 GM/DL (ref 2.4–3.5)
GLUCOSE SERPL-MCNC: 95 MG/DL (ref 82–115)
HCT VFR BLD AUTO: 40.3 % (ref 37–47)
HGB BLD-MCNC: 13.3 GM/DL (ref 12–16)
LYMPHOCYTES # BLD AUTO: 1.4 X10(3)/MCL (ref 0.6–4.6)
LYMPHOCYTES NFR BLD AUTO: 24.3 %
MCH RBC QN AUTO: 30.9 PG (ref 27–31)
MCHC RBC AUTO-ENTMCNC: 33 GM/DL (ref 33–36)
MCV RBC AUTO: 93.7 FL (ref 80–94)
MONOCYTES # BLD AUTO: 0.5 X10(3)/MCL (ref 0.1–1.3)
MONOCYTES NFR BLD AUTO: 8.4 %
NEUTROPHILS # BLD AUTO: 3.6 X10(3)/MCL (ref 2.1–9.2)
NEUTROPHILS NFR BLD AUTO: 63.3 %
PLATELET # BLD AUTO: 183 X10(3)/MCL (ref 130–400)
PMV BLD AUTO: 9.1 FL (ref 9.4–12.4)
POTASSIUM SERPL-SCNC: 4.5 MMOL/L (ref 3.5–5.1)
PROT SERPL-MCNC: 7.2 GM/DL (ref 5.8–7.6)
RBC # BLD AUTO: 4.3 X10(6)/MCL (ref 4.2–5.4)
SODIUM SERPL-SCNC: 146 MMOL/L (ref 136–145)
WBC # SPEC AUTO: 5.7 X10(3)/MCL (ref 4.5–11.5)

## 2020-11-30 ENCOUNTER — HISTORICAL (OUTPATIENT)
Dept: INFUSION THERAPY | Facility: HOSPITAL | Age: 85
End: 2020-11-30

## 2021-02-10 ENCOUNTER — HISTORICAL (OUTPATIENT)
Dept: ADMINISTRATIVE | Facility: HOSPITAL | Age: 86
End: 2021-02-10

## 2021-02-26 ENCOUNTER — HISTORICAL (OUTPATIENT)
Dept: HEMATOLOGY/ONCOLOGY | Facility: CLINIC | Age: 86
End: 2021-02-26

## 2021-02-26 LAB
ABS NEUT (OLG): 3.24 X10(3)/MCL (ref 2.1–9.2)
ALBUMIN SERPL-MCNC: 4 GM/DL (ref 3.4–4.8)
ALP SERPL-CCNC: 117 UNIT/L (ref 40–150)
ALT SERPL-CCNC: 12 UNIT/L (ref 0–55)
ANION GAP SERPL CALC-SCNC: 15 MMOL/L
AST SERPL-CCNC: 24 UNIT/L (ref 5–34)
BASOPHILS # BLD AUTO: 0 X10(3)/MCL (ref 0–0.2)
BASOPHILS NFR BLD AUTO: 0.9 %
BILIRUB SERPL-MCNC: 2.1 MG/DL
BILIRUBIN DIRECT+TOT PNL SERPL-MCNC: 0.8 MG/DL (ref 0–0.5)
BILIRUBIN DIRECT+TOT PNL SERPL-MCNC: 1.3 MG/DL (ref 0–0.8)
BUN SERPL-MCNC: 12 MG/DL (ref 8–26)
CHLORIDE SERPL-SCNC: 103 MMOL/L (ref 98–109)
CREAT SERPL-MCNC: 0.7 MG/DL (ref 0.6–1.3)
EOSINOPHIL # BLD AUTO: 0.1 X10(3)/MCL (ref 0–0.9)
EOSINOPHIL NFR BLD AUTO: 2.6 %
ERYTHROCYTE [DISTWIDTH] IN BLOOD BY AUTOMATED COUNT: 13.4 % (ref 11.5–17)
GLUCOSE SERPL-MCNC: 90 MG/DL (ref 70–105)
HCT VFR BLD AUTO: 41.8 % (ref 37–47)
HCT VFR BLD CALC: 40 % (ref 38–51)
HGB BLD-MCNC: 13.6 MG/DL (ref 12–17)
HGB BLD-MCNC: 13.8 GM/DL (ref 12–16)
LIVER PROFILE INTERP: ABNORMAL
LYMPHOCYTES # BLD AUTO: 1.5 X10(3)/MCL (ref 0.6–4.6)
LYMPHOCYTES NFR BLD AUTO: 27.8 %
MCH RBC QN AUTO: 30 PG (ref 27–31)
MCHC RBC AUTO-ENTMCNC: 33 GM/DL (ref 33–36)
MCV RBC AUTO: 90.9 FL (ref 80–94)
MONOCYTES # BLD AUTO: 0.5 X10(3)/MCL (ref 0.1–1.3)
MONOCYTES NFR BLD AUTO: 9 %
NEUTROPHILS # BLD AUTO: 3.2 X10(3)/MCL (ref 2.1–9.2)
NEUTROPHILS NFR BLD AUTO: 59.5 %
PLATELET # BLD AUTO: 210 X10(3)/MCL (ref 130–400)
PMV BLD AUTO: 9.4 FL (ref 9.4–12.4)
POC IONIZED CALCIUM: 1.1 MMOL/L (ref 1.12–1.32)
POC TCO2: 28 MMOL/L (ref 24–29)
POTASSIUM BLD-SCNC: 4 MMOL/L (ref 3.5–4.9)
PROT SERPL-MCNC: 7.6 GM/DL (ref 5.8–7.6)
RBC # BLD AUTO: 4.6 X10(6)/MCL (ref 4.2–5.4)
SODIUM BLD-SCNC: 141 MMOL/L (ref 138–146)
WBC # SPEC AUTO: 5.4 X10(3)/MCL (ref 4.5–11.5)

## 2021-06-02 ENCOUNTER — HISTORICAL (OUTPATIENT)
Dept: INFUSION THERAPY | Facility: HOSPITAL | Age: 86
End: 2021-06-02

## 2021-06-22 ENCOUNTER — HISTORICAL (OUTPATIENT)
Dept: RADIOLOGY | Facility: HOSPITAL | Age: 86
End: 2021-06-22

## 2021-09-10 ENCOUNTER — HISTORICAL (OUTPATIENT)
Dept: HEMATOLOGY/ONCOLOGY | Facility: CLINIC | Age: 86
End: 2021-09-10

## 2021-09-10 LAB
ABS NEUT (OLG): 3.18 X10(3)/MCL (ref 2.1–9.2)
ALBUMIN SERPL-MCNC: 3.6 GM/DL (ref 3.4–4.8)
ALP SERPL-CCNC: 83 UNIT/L (ref 40–150)
ALT SERPL-CCNC: 12 UNIT/L (ref 0–55)
ANION GAP SERPL CALC-SCNC: 18 MMOL/L
AST SERPL-CCNC: 24 UNIT/L (ref 5–34)
BASOPHILS # BLD AUTO: 0.1 X10(3)/MCL (ref 0–0.2)
BASOPHILS NFR BLD AUTO: 1.1 %
BILIRUB SERPL-MCNC: 2.3 MG/DL
BILIRUBIN DIRECT+TOT PNL SERPL-MCNC: 0.8 MG/DL (ref 0–0.5)
BILIRUBIN DIRECT+TOT PNL SERPL-MCNC: 1.5 MG/DL (ref 0–0.8)
BUN SERPL-MCNC: 15 MG/DL (ref 8–26)
CHLORIDE SERPL-SCNC: 105 MMOL/L (ref 98–109)
CREAT SERPL-MCNC: 0.8 MG/DL (ref 0.6–1.3)
EOSINOPHIL # BLD AUTO: 0.1 X10(3)/MCL (ref 0–0.9)
EOSINOPHIL NFR BLD AUTO: 2.4 %
ERYTHROCYTE [DISTWIDTH] IN BLOOD BY AUTOMATED COUNT: 12.8 % (ref 11.5–17)
GLUCOSE SERPL-MCNC: 92 MG/DL (ref 70–105)
HCT VFR BLD AUTO: 41.3 % (ref 37–47)
HCT VFR BLD CALC: 39 % (ref 38–51)
HGB BLD-MCNC: 13.3 MG/DL (ref 12–17)
HGB BLD-MCNC: 13.4 GM/DL (ref 12–16)
LIVER PROFILE INTERP: ABNORMAL
LYMPHOCYTES # BLD AUTO: 1.7 X10(3)/MCL (ref 0.6–4.6)
LYMPHOCYTES NFR BLD AUTO: 31.3 %
MCH RBC QN AUTO: 30.5 PG (ref 27–31)
MCHC RBC AUTO-ENTMCNC: 32.4 GM/DL (ref 33–36)
MCV RBC AUTO: 94.1 FL (ref 80–94)
MONOCYTES # BLD AUTO: 0.4 X10(3)/MCL (ref 0.1–1.3)
MONOCYTES NFR BLD AUTO: 7.3 %
NEUTROPHILS # BLD AUTO: 3.2 X10(3)/MCL (ref 2.1–9.2)
NEUTROPHILS NFR BLD AUTO: 57.7 %
PLATELET # BLD AUTO: 186 X10(3)/MCL (ref 130–400)
PMV BLD AUTO: 9.7 FL (ref 9.4–12.4)
POC IONIZED CALCIUM: 1.16 MMOL/L (ref 1.12–1.32)
POC TCO2: 27 MMOL/L (ref 24–29)
POTASSIUM BLD-SCNC: 4.3 MMOL/L (ref 3.5–4.9)
PROT SERPL-MCNC: 7 GM/DL (ref 5.8–7.6)
RBC # BLD AUTO: 4.39 X10(6)/MCL (ref 4.2–5.4)
SODIUM BLD-SCNC: 144 MMOL/L (ref 138–146)
WBC # SPEC AUTO: 5.5 X10(3)/MCL (ref 4.5–11.5)

## 2021-09-28 ENCOUNTER — HISTORICAL (OUTPATIENT)
Dept: RADIOLOGY | Facility: HOSPITAL | Age: 86
End: 2021-09-28

## 2021-12-06 ENCOUNTER — HISTORICAL (OUTPATIENT)
Dept: INFUSION THERAPY | Facility: HOSPITAL | Age: 86
End: 2021-12-06

## 2022-03-11 ENCOUNTER — HISTORICAL (OUTPATIENT)
Dept: ADMINISTRATIVE | Facility: HOSPITAL | Age: 87
End: 2022-03-11

## 2022-03-11 LAB
ABS NEUT (OLG): 2.82 (ref 2.1–9.2)
ALBUMIN SERPL-MCNC: 3.6 G/DL (ref 3.4–4.8)
ALBUMIN/GLOB SERPL: 1 {RATIO} (ref 1.1–2)
ALP SERPL-CCNC: 83 U/L (ref 40–150)
ALT SERPL-CCNC: 10 U/L (ref 0–55)
AST SERPL-CCNC: 20 U/L (ref 5–34)
BASOPHILS # BLD AUTO: 0 10*3/UL (ref 0–0.2)
BASOPHILS NFR BLD AUTO: 1 %
BILIRUB SERPL-MCNC: 2.1 MG/DL
BILIRUBIN DIRECT+TOT PNL SERPL-MCNC: 0.8 (ref 0–0.5)
BILIRUBIN DIRECT+TOT PNL SERPL-MCNC: 1.3 (ref 0–0.8)
BUN SERPL-MCNC: 12.9 MG/DL (ref 9.8–20.1)
CALCIUM SERPL-MCNC: 8.8 MG/DL (ref 8.7–10.5)
CHLORIDE SERPL-SCNC: 110 MMOL/L (ref 98–107)
CO2 SERPL-SCNC: 29 MMOL/L (ref 23–31)
CREAT SERPL-MCNC: 0.76 MG/DL (ref 0.55–1.02)
EOSINOPHIL # BLD AUTO: 0.1 10*3/UL (ref 0–0.9)
EOSINOPHIL NFR BLD AUTO: 2.7 %
ERYTHROCYTE [DISTWIDTH] IN BLOOD BY AUTOMATED COUNT: 13.1 % (ref 11.5–17)
GLOBULIN SER-MCNC: 3.5 G/DL (ref 2.4–3.5)
GLUCOSE SERPL-MCNC: 89 MG/DL (ref 82–115)
HCT VFR BLD AUTO: 41.8 % (ref 37–47)
HEMOLYSIS INTERF INDEX SERPL-ACNC: 4
HGB BLD-MCNC: 13.6 G/DL (ref 12–16)
ICTERIC INTERF INDEX SERPL-ACNC: 2
LIPEMIC INTERF INDEX SERPL-ACNC: 2
LYMPHOCYTES # BLD AUTO: 1.3 10*3/UL (ref 0.6–4.6)
LYMPHOCYTES NFR BLD AUTO: 27.6 %
MANUAL DIFF? (OHS): NO
MCH RBC QN AUTO: 30.3 PG (ref 27–31)
MCHC RBC AUTO-ENTMCNC: 32.5 G/DL (ref 33–36)
MCV RBC AUTO: 93.1 FL (ref 80–94)
MONOCYTES # BLD AUTO: 0.5 10*3/UL (ref 0.1–1.3)
MONOCYTES NFR BLD AUTO: 9.6 %
NEUTROPHILS # BLD AUTO: 2.8 10*3/UL (ref 2.1–9.2)
NEUTROPHILS NFR BLD AUTO: 58.9 %
PLATELET # BLD AUTO: 200 10*3/UL (ref 130–400)
PMV BLD AUTO: 9.5 FL (ref 9.4–12.4)
POTASSIUM SERPL-SCNC: 4.5 MMOL/L (ref 3.5–5.1)
PROT SERPL-MCNC: 7.1 G/DL (ref 5.8–7.6)
RBC # BLD AUTO: 4.49 10*6/UL (ref 4.2–5.4)
SODIUM SERPL-SCNC: 145 MMOL/L (ref 136–145)
WBC # SPEC AUTO: 4.8 10*3/UL (ref 4.5–11.5)

## 2022-04-30 VITALS
SYSTOLIC BLOOD PRESSURE: 149 MMHG | OXYGEN SATURATION: 99 % | HEIGHT: 65 IN | WEIGHT: 246.94 LBS | DIASTOLIC BLOOD PRESSURE: 76 MMHG | BODY MASS INDEX: 41.14 KG/M2

## 2022-04-30 NOTE — OP NOTE
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    PROCEDURE:    1. Injection of 1 mCi of Technetium sulfur colloid for sentinel lymph node identification.  2. Injection of 0.5 ml of Isosulfan blue dye for sentinel lymph node identification and lymphatic mapping.  3. Left breast ultrasound guided lumpectomy.  4. V-Y advancement flap closure 6 x 8 x 8 cm.  5. Left axillary sentinel lymph node biopsy with Neoprobe guidance.   Anesthesia:  General with local infiltration.  Estimated blood loss:  Negligible, less than 5 ml.    SPECIMEN REMOVED:    1. Left lumpectomy.  2. Left axillary sentinel lymph node.    Notation:  The patient's previous periareolar biopsy clip, in addition to the clip marking this most recent biopsy were both included in the resection specimen.    PROCEDURE IN DETAIL:  After proper informed consent was obtained the patient was transported to the operating room where she was placed supine on the operating room table.  After an adequate level of general endotracheal anesthesia was achieved the patient's left chest, arm and axilla were prepped and draped in standard sterile surgical fashion.  The operation commenced with infiltration of 1 mCi of Technetium sulfur colloid into the dermis just lateral to the periareolar complex.  This was followed by immediately injecting 0.3 ml of Isosulfan blue dye into the dermis immediately adjacent to the Technetium injection site.  Ultrasound interrogation of the left breast then revealed the lesion of primary concern along with its associated biopsy marker clip as well as small marker clip at the superior aspect  of the patient's nipple areolar complex from a previous remote biopsy.  A planned surgical resection including the entirety of the associated terminal ductal lobular unit was made.  This was immediately adjacent to the nipple duct complex and encompassed a fairly significant portion of the breast and there was also extension of the neoplastic process to the nipple duct complex.  Subsequently subareolar nipple duct resection was planned.  After assessing all options a V-Y resection of the lateral breast tissue and skin with V-Y rotational closure of the skin and the breast tissue would give the most satisfactory result to best facilitate wound healing with the least amount of tissue being resected.  Subsequently a triangular wedge of skin from the 1 o'clock to the 5 o'clock radiant of the right nipple areolar complex margin was marked along with a lateral extension in a triangular fashion.  Local anesthetic was infiltrated throughout the field.  The skin incision was made with scalpel.  Bovie electrocautery dissection then encompassed the entirety of that radiant of the breast down to the level of the pectoral fascia including the entirety of the nipple duct complex.  The specimen was marked and passed off the field.  The breast was undermined from the pectoral fascia, rotated in the field of defect and then closed at multiple levels with 3-0 Vicryl suture.  The skin was then closed in V-Y advancement flap type technique while rounding the aspect of the closure onto the areola to achieve a more natural contour.  In the interim the specimen underwent specimen radiography and assessment for gross margins, both of which proved to be adequate.  The wound was closed with final layer of 5-0 Monocryl suture.  Sterile dressings were applied and attention was turned to the left axilla where Neoprobe interrogation revealed an area of peak radioactivity in its expected location just inferior to the nipple duct complex just  inferior to the inferior portion of the hair bearing aspect of the axilla.  A 3 cm incision was made which was preceded by infiltration of local anesthetic.  Dissection then began through the subcutaneous tissues of the fascia of the of the axilla, which was infiltrated with local anesthetic.  Open blunt dissection was then made to a dominant green hot lymphatic channel, which was tracked down to a single hot blue lymph node.  That node was excised over fine titanium clips, resulting in removal of greater than 95% of the background radioactivity.  Subsequently the wound was infiltrated with local anesthetic, closed in multiple layers.  This was followed by 4-0 Monocryl skin closure.  Sterile dressings were applied.  The patient was awakened from her anesthesia and transported to recovery room in good and stable condition.  All sponge, needle and instrument counts were correct at the end of the case.  There were no complications.        ______________________________  MD THERESA Welsh IV/DIYA  DD:  04/26/2017  Time:  03:07PM  DT:  04/27/2017  Time:  10:20AM  Job #:  [id number]    cc: MD Yoandy Pressley MD Ladislas Lazaro IV, MD Richard Lastrapes, MD

## 2022-04-30 NOTE — PROGRESS NOTES
Patient:   Margo Mishra            MRN: [id number]            FIN: [id number]               Age:   84 years     Sex:  Female     :  1932   Associated Diagnoses:   History of colon cancer; Malignant neoplasm of upper-outer quadrant of left female breast   Author:   Alvaro REED, Yenifer CHAUHAN      Visit Information      Surgeon: Dr. Lazaro Prado    Breast Cancer Stage IA ([id number]) diagnosed 3/30/17  Biopsy/pathology: Left breast US-guided biopsy done 3/30/17--microinvasive solid papillary carcinoma, grade 2, size cannot be determined, DCIS present, no lymphovascular invasion, ER 95.3%, FL 60.5%, Her2 1+ by IHC, Ki67 13.6%.  Surgery/pathology: Left breast lumpectomy and SLN biopsy done 17--solid papillary carcinoma, greatest dimension of residual tumor 0.5cm, grade 2, no lymphovascular invasion, margins clear, 1 SLN negative.  Imagin. Bilateral screening MMG 16--persistent asymmetry left breast, right breast good.   2. Left breast diagnostic MMG/US 16--Left breast 3:00 sonographic hypoechoic mass, minimally increased in size from 2015. Follow-up recommended in 6 months.  3. Attempted stereotactic biopsy done 16--patient could not lay prone.  4. Unilateral MMG and Left breast US 3/16/17--slightly irregular lesion 3:00 left breast, BIRADS 4, biopsy recommended.      Treatment plan: Patient declined radiation          Femara started 5/15/17        Visit type:  Scheduled follow-up.    Accompanied by:  daughter.       Chief Complaint   2017 9:35 CDT       No c/o        Interval History   Current complaint: Patient presents for follow-up. She is tolerating Femara well with only some mild hot flashes. Otherwise she shirley no new complaints today.      Review of Systems   Constitutional:  Fatigue, No fever, No chills, No weakness.    Eye:  No recent visual problem.    Ear/Nose/Mouth/Throat:  No decreased hearing, No nasal congestion, No sore throat.    Respiratory:  Shortness of breath,  Wheezing, No cough.    Cardiovascular:  No chest pain, No palpitations, No peripheral edema.    Gastrointestinal:  Diarrhea (chronic), No nausea, No vomiting, No constipation, No abdominal pain.    Hematology/Lymphatics:  No bruising tendency, No bleeding tendency.    Musculoskeletal:  Joint pain (RA), No back pain.    Integumentary:  No rash, No skin lesion.    Neurologic:  No confusion, No headache.    Psychiatric:  No anxiety, No depression.    All other systems are negative      Health Status   Allergies:    Allergies (1) Active Reaction  codeine Agitation     Current medications:  (Selected)   Prescriptions  Prescribed  Femara 2.5 mg oral tablet: 2.5 mg = 1 tab(s), Oral, Daily, # 30 tab(s), 6 Refill(s), [id number]  Documented Medications  Documented  Caltrate 600 + D oral tablet: 1 tab(s), Oral, BID, 0 Refill(s)  Nasonex 50 mcg/inh nasal spray: 2 spray(s), Both Nostrils, Daily, # 1 bottle(s), 2 Refill(s)  Norco 10 mg-325 mg oral tablet: 1 tab(s), Oral, BID, PRN PRN as needed for pain, 0 Refill(s)  ProAir HFA 90 mcg/inh inhalation aerosol with adapter: 2 puff(s), INH, QID, PRN PRN as needed for wheezing, # 8.5 gm, 0 Refill(s)  Remicade 100 mg intravenous injection: q6wk, 600mg, 0 Refill(s)  aspirin 81 mg oral Delayed Release (EC) tablet: 81 mg = 1 tab(s), Oral, Daily, # 30 tab(s), 0 Refill(s)  folic acid 1 mg oral tablet: 1 mg = 1 tab(s), Oral, Daily, # 90 tab(s), 0 Refill(s)  gabapentin 300 mg oral capsule: 300 mg = 1 cap(s), Oral, Daily, 0 Refill(s)  methotrexate 5 mg oral tablet: 5 mg = 1 tab(s), Oral, qWeek, # 1 tab(s), 0 Refill(s)      Histories   Past Medical History:    No active or resolved past medical history items have been selected or recorded.   Family History:    Primary malignant neoplasm of colon  Father ()  Primary malignant neoplasm of lung  Sister ()  Sister ()     Procedure history:    Biopsy Sentinal Node (Left) on 2017 at 84  Years.  Comments:  4/26/2017 12:56 - Chetna Stout RN  auto-populated from documented surgical case  Lumpectomy Breast (Left) on 4/26/2017 at 84 Years.  Comments:  4/26/2017 12:56 - Chetna Stout RN  auto-populated from documented surgical case  eye.  goiter.  Tonsillectomy with adenoidectomy ([id number]).  Appendectomy ([id number]).  Hysterectomy (409903924).  Knee replacement (168986351).  Comments:  4/20/2017 15:44 - Nyla Akers RN  right  Colonoscopy ([id number]).  Hemorrhoidectomy ([id number]).  Partial resection of colon ([id number]).   Social History          Social & Psychosocial Habits    Alcohol    Comment: Denies alcohol use. - 05/15/2017 10:47 - Roddy Juares LPN    Employment/School  05/15/2017  Status: Retired    Description: Retired postal employee    Home/Environment  05/15/2017  Lives with: Children    Comment: Lives with a daughter and son - 05/15/2017 10:48 - Roddy Juares LPN    Tobacco  04/20/2017  Use: Former smoker    Type: Cigarettes    Stopped at age: 52 Years  .        Physical Examination      Vital Signs (last 24 hrs)_____  Last Charted___________  Temp Oral     36.8 DegC  (JUL 17 09:35)  Heart Rate Peripheral   64 bpm  (JUL 17 09:35)  SBP      H 155mmHg  (JUL 17 09:35)  DBP      76 mmHg  (JUL 17 09:35)  SpO2      95 %  (JUL 17 09:35)  Weight      108 kg  (JUL 17 09:35)  Height      160 cm  (JUL 17 09:35)  BMI      42.19  (JUL 17 09:35)     General:  Alert and oriented, No acute distress, elderly chronically ill-appearing white female.    Eye:  Vision unchanged.    HENT:  Normocephalic, Normal hearing, Oral mucosa is moist.    Neck:  Supple, No jugular venous distention, No lymphadenopathy, No thyromegaly.    Respiratory:  Breath sounds are equal, Occ wheeze, decreased breath sounds throughout.    Cardiovascular:  Normal rate, Regular rhythm, No murmur, No gallop, Normal peripheral perfusion, trace edema bilateral LE.    Gastrointestinal:  Soft, Non-tender, Non-distended,  Normal bowel sounds, No organomegaly, scar from previous colon surgery.    Lymphatics:  No lymphadenopathy neck, axilla, groin.    Musculoskeletal:  Normal range of motion, Normal strength, No deformity, Normal gait.    Integumentary:  Warm, Intact.    Neurologic:  Alert, Oriented, Normal sensory, Normal motor function.    Psychiatric:  Cooperative, Appropriate mood & affect.    Breast:  Left breast with scar from surgery lateral to nipple, healed well, axillary scar healed well. Right breast normal. No palpable axillary adenopathy bilaterally.       Review / Management   Results review:  Lab results   7/17/2017 8:51 CDT       WBC                       5.4 x10(3)/mcL                             RBC                       4.67 x10(6)/mcL                             Hgb                       14.4 gm/dL                             Hct                       43.5 %                             Platelet                  182 x10(3)/mcL                             MCV                       93.1 fL                             MCH                       30.8 pg                             MCHC                      33.1 gm/dL                             RDW                       14.0 %                             MPV                       10.0 fL                             Abs Neut                  3.22 x10(3)/mcL                             NEUT%                     59.7 %  NA                             NEUT#                     3.2 x10(3)/mcL                             LYMPH%                    25.0 %  NA                             LYMPH#                    1.4 x10(3)/mcL                             MONO%                     9.8 %  NA                             MONO#                     0.5 x10(3)/mcL                             EOS%                      4.4 %  NA                             EOS#                      0.2 x10(3)/mcL                             BASO%                     1.1 %  NA                              BASO#                     0.1 x10(3)/mcL  .       Impression and Plan   Diagnosis     History of colon cancer (CTK12-IS Z85.038).     Malignant neoplasm of upper-outer quadrant of left female breast ([id number]-UA C50.412).     Plan:  Patient with breast cancer stage IA s/p lumpectomy done 4/26/17. ER/SC positive and Her 2 negative.  Tumor was small 0.5cm although may have been larger but no way to tell since there was not a measurement from the biopsy.  Per NCCN guidelines, no chemotherapy recommended due to small size of tumor.  Radiaton offered but patient refused.  Treatment with AI alone recommended and patient started Femara on 5/15/17.  She is tolerating well.    Currently patient has no signs or symptoms to suggest diseae recurrence by labs or physical exam findings.  Continue Femara.  Continue Calcium and vitamin D.    RTC 3 months for follow-up.  Left breast MMG post-lumpectomy due 11/2017 and [place] [facility]  [place] also [place] [facility] [place].    She has h/o colon cancer vs. dysplastic polyp in cecum in 2005--last colonoscopy was in 2013 Dr. Nelson, 2 polyps removed, both tubular adenomas. I was not able to get pathology records from original surgery.    All questions answered at this time.    Patient was told to contact me with any problems before return to clinic.        Yenifer John MD

## 2022-05-02 NOTE — HISTORICAL [facility] CERNER
This is a historical note converted from [employer]. Formatting and pictures may have been removed.  Please reference [employer] for original formatting and attached multimedia. Chief Complaint  LT foot pain, swelling, feels hot stated started hurting in the middle of the night 3 days ago  History of Present Illness  88-year-old female presents with?pain to the left ankle and foot.? Onset?3 days ago.? States fell last week while walking at home?but unrelated to the foot and ankle pain.? Pain reproducible with ambulation and with flexion and extension of the left foot.??States has had surgery on the left foot several years ago in [place]?has not seen orthopedics in many years. ?Also has seen [facility] orthopedics for the right foot?but also has been many years ago. ?Has been taking home pain prescription Norco with minimal relief.  Review of Systems  Constitutional_no fever, fatigue, weakness  Musculoskeletal_left foot and ankle pain  Integumentary_no skin rash or abnormal lesion  Neurologic_no headache, no dizziness, no weakness or numbness  ?  Physical Exam  Vitals & Measurements  T:?36.7? ?C (Oral)? HR:?96(Peripheral)? RR:?20? BP:?149/76? SpO2:?99%?  HT:?165.00?cm? WT:?112.000?kg? BMI:?41.14?  General_well-developed well-nourished in no acute distress  Musculoskeletal_tenderness to palpation to the left dorsal foot?and lateral ankle. ?Mild swelling?without contusion or erythema.? Pain reproducible with flexion and extension of the left foot.? Good sensation strong distal pulses.  Integumentary_no rashes or skin lesions present  Neurologic_ cranial nerves intact, no signs of peripheral neurological deficit, motor/sensory function intact?  ?  Assessment/Plan  1.?Left ankle pain?M25.572  Modify activity as necessary, wear ankle support, use wheelchair if necessary  Rest, ice, compression, elevation.  Continue taking home pain prescription medication as directed.  Contact this clinic or your primary care provider if  not improved with this treatment plan over the next 7 days  We will notify you of the final radiology x-ray report result.? I will refer to orthopedics.  Ordered:  Ankle Brace / ASP  PC, 02/10/21 12:21:00 CST, [facility] [place], Routine, 02/10/21 12:21:00 CST, Left ankle pain  Office/Outpatient Visit Level 3 Established [id number] PC, Left ankle pain  Left foot pain, 02/10/21 12:22:00 CST  XR Ankle Left Minimum 3 Views, Routine, 02/10/21 11:33:00 CST, None, Ambulatory, Rad Type, Left ankle pain, Not Scheduled, 02/10/21 11:33:00 CST  ?  2.?Left foot pain?M79.672  Ordered:  Office/Outpatient Visit Level 3 Established [id number] PC, Left ankle pain  Left foot pain, 02/10/21 12:22:00 CST  XR Foot Left Minimum 3 Views, Routine, 02/10/21 11:33:00 CST, None, Ambulatory, Rad Type, Left foot pain, Not Scheduled, 02/10/21 11:33:00 CST  ?  Referrals  Ambulatory Referral, Specialty: Orthopedic Surgery, Reason: left ankle and foot pain, Start: 02/10/21 12:21:00 CST, Left ankle pain   Problem List/Past Medical History  Ongoing  Arthritis  Bone lesion  Breast cancer  Colon cancer  COPD - Chronic obstructive pulmonary disease  Diarrhea  Emphysema lung  Gall stones, common bile duct  Goiter  Incisional hernia  Lesion of right native kidney  Nodule of upper lobe of left lung  OAB (overactive bladder)  Osteoporosis  Overactive bladder  Review of care plan  Historical  COPD - Chronic obstructive pulmonary disease  Rheumatoid arthritis  Procedure/Surgical History  Excision of Thoracic Vertebral Disc, Percutaneous Approach, Diagnostic (02/19/2019)  Percutaneous aspiration within the nucleus pulposus, intervertebral disc, or paravertebral tissue for diagnostic purposes (02/19/2019)  Colonoscopy (01/05/2018)  Excision of Sigmoid Colon, Via Natural or Artificial Opening Endoscopic, Diagnostic (01/05/2018)  Excision of Transverse Colon, Via Natural or Artificial Opening Endoscopic, Diagnostic (01/05/2018)  Polypectomy  (01/05/2018)  Adjacent tissue transfer or rearrangement, trunk; defect 10 sq cm or less (04/26/2017)  Biopsy or excision of lymph node(s); open, superficial (04/26/2017)  Biopsy Sentinal Node (Left) (04/26/2017)  Excision of Left Axillary Lymphatic, Open Approach (04/26/2017)  Excision of Left Breast, Open Approach (04/26/2017)  Injection procedure; radioactive tracer for identification of sentinel node (04/26/2017)  Lumpectomy Breast (Left) (04/26/2017)  Mastectomy, partial (eg, lumpectomy, tylectomy, quadrantectomy, segmentectomy); (04/26/2017)  Replacement of Left Breast with Autologous Tissue Substitute, Open Approach (04/26/2017)  Appendectomy  Colonoscopy  eye  goiter  Hemorrhoidectomy  Hysterectomy  Knee replacement  Partial resection of colon  Tonsillectomy with adenoidectomy   Medications  amLODIPine 2.5 mg oral tablet  aspirin 81 mg oral Delayed Release (EC) tablet, 81 mg= 1 tab(s), Oral, Daily  ergocalciferol 50,000 intl units (1.25 mg) oral capsule  folic acid 1 mg oral tablet, 1 mg= 1 tab(s), Oral, Daily  gabapentin 300 mg oral capsule, 300 mg= 1 cap(s), Oral, Daily  letrozole 2.5 mg oral tablet, See Instructions  methotrexate 2.5 mg oral tablet  Norco 10 mg-325 mg oral tablet, 1 tab(s), Oral, BID, PRN  Prolia 60 mg/mL subcutaneous solution, 60 mg= 1 mL, Subcutaneous, Once-NOW  Remicade 100 mg intravenous injection, q6wk  Sensipar 30 mg oral tablet, 30 mg= 1 tab(s), Oral, Every other day  Stiolto Respimat 60 ACT 2.5 mcg-2.5 mcg/inh inhalation aerosol, 2 puff(s), INH, q24hr  Trelegy Ellipta 100 mcg-62.5 mcg-25 mcg/inh inhalation powder, 1 puff(s), Oral, Daily  Allergies  codeine?(Agitation, Confusion)  Social History  Abuse/Neglect  No, 02/10/2021  Alcohol  Employment/School  Retired, Work/School description: Retired postal employee., 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  Primary malignant neoplasm of lung: Sister and Sister.  Mother: History is unknown  Health Maintenance  Health Maintenance  ???Pending?(in the next year)  ??? ??OverDue  ??? ? ? ?COPD Management-COPD Medications Prescribed due??04/26/18??and every 1??year(s)  ??? ? ? ?Influenza Vaccine due??10/01/20??and every 1??day(s)  ??? ? ? ?Cognitive Screening due??01/02/21??and every 1??year(s)  ??? ? ? ?Functional Assessment due??01/02/21??and every 1??year(s)  ??? ??Due?  ??? ? ? ?ADL Screening due??02/10/21??and every 1??year(s)  ??? ? ? ?COPD Maintenance-Pulmonary Rehab Education due??02/10/21??and every 1??year(s)  ??? ? ? ?COPD Maintenance-Spirometry due??02/10/21??Unknown Frequency  ??? ? ? ?Lipid Screening due??02/10/21??Unknown Frequency  ??? ? ? ?Medicare Annual Wellness Exam due??02/10/21??and every 1??year(s)  ??? ? ? ?Pneumococcal Vaccine due??02/10/21??Unknown Frequency  ??? ? ? ?Tetanus Vaccine due??02/10/21??and every 10??year(s)  ??? ? ? ?Zoster Vaccine due??02/10/21??Unknown Frequency  ??? ??Due In Future?  ??? ? ? ?Depression Screening not due until??08/19/21??and every 1??year(s)  ??? ? ? ?Obesity Screening not due until??01/01/22??and every 1??year(s)  ??? ? ? ?Advance Directive not due until??01/02/22??and every 1??year(s)  ??? ? ? ?Fall Risk Assessment not due until??01/02/22??and every 1??year(s)  ???Satisfied?(in the past 1 year)  ??? ??Satisfied?  ??? ? ? ?Advance Directive on??02/10/21.??Satisfied by Alvaro Guzman.  ??? ? ? ?Blood Pressure Screening on??02/10/21.??Satisfied by Alvaro Guzman.  ??? ? ? ?Body Mass Index Check on??02/10/21.??Satisfied by Alvaro Guzman.  ??? ? ? ?Breast Cancer Screening on??05/13/20.??Satisfied by Kristopher ?Johana VALENCIA  ??? ? ? ?COPD Management-Oxygen Assessment on??02/10/21.??Satisfied by Alvaro Guzman.  ??? ? ? ?Depression Screening on??08/19/20.??Satisfied by Mor ELLIOTT,  Roddy GONCALVES  ??? ? ? ?Diabetes Screening on??08/14/20.??Satisfied by Piedad Blas  ??? ? ? ?Fall Risk Assessment on??02/10/21.??Satisfied by Alvaro Guzman  ??? ? ? ?Functional Assessment on??11/30/20.??Satisfied by Lyn Lewis RN  ??? ? ? ?Hypertension Management-Blood Pressure on??02/10/21.??Satisfied by Alvaro Guzman  ??? ? ? ?Obesity Screening on??02/10/21.??Satisfied by Alvaro Guzman  ?

## 2022-06-03 DIAGNOSIS — M81.0 AGE-RELATED OSTEOPOROSIS WITHOUT CURRENT PATHOLOGICAL FRACTURE: ICD-10-CM

## 2022-06-06 ENCOUNTER — INFUSION (OUTPATIENT)
Dept: INFUSION THERAPY | Facility: HOSPITAL | Age: 87
End: 2022-06-06
Attending: NURSE PRACTITIONER
Payer: MEDICARE

## 2022-06-06 VITALS
HEIGHT: 65 IN | BODY MASS INDEX: 40.48 KG/M2 | DIASTOLIC BLOOD PRESSURE: 69 MMHG | WEIGHT: 243 LBS | SYSTOLIC BLOOD PRESSURE: 124 MMHG | HEART RATE: 80 BPM

## 2022-06-06 DIAGNOSIS — M81.0 AGE-RELATED OSTEOPOROSIS WITHOUT CURRENT PATHOLOGICAL FRACTURE: Primary | ICD-10-CM

## 2022-06-06 PROCEDURE — 96372 THER/PROPH/DIAG INJ SC/IM: CPT

## 2022-06-06 PROCEDURE — 63600175 PHARM REV CODE 636 W HCPCS: Mod: JG | Performed by: NURSE PRACTITIONER

## 2022-06-06 RX ADMIN — DENOSUMAB 60 MG: 60 INJECTION SUBCUTANEOUS at 11:06

## 2022-06-27 ENCOUNTER — HOSPITAL ENCOUNTER (OUTPATIENT)
Dept: RADIOLOGY | Facility: HOSPITAL | Age: 87
Discharge: HOME | End: 2022-06-27
Attending: NURSE PRACTITIONER
Payer: MEDICARE

## 2022-06-27 DIAGNOSIS — Z12.31 BREAST CANCER SCREENING BY MAMMOGRAM: ICD-10-CM

## 2022-06-27 PROCEDURE — 77063 MAMMO DIGITAL SCREENING BILAT WITH TOMO: ICD-10-PCS | Mod: 26,,, | Performed by: STUDENT IN AN ORGANIZED HEALTH CARE EDUCATION/TRAINING PROGRAM

## 2022-06-27 PROCEDURE — 77067 MAMMO DIGITAL SCREENING BILAT WITH TOMO: ICD-10-PCS | Mod: 26,,, | Performed by: STUDENT IN AN ORGANIZED HEALTH CARE EDUCATION/TRAINING PROGRAM

## 2022-06-27 PROCEDURE — 77063 BREAST TOMOSYNTHESIS BI: CPT | Mod: 26,,, | Performed by: STUDENT IN AN ORGANIZED HEALTH CARE EDUCATION/TRAINING PROGRAM

## 2022-06-27 PROCEDURE — 77067 SCR MAMMO BI INCL CAD: CPT | Mod: 26,,, | Performed by: STUDENT IN AN ORGANIZED HEALTH CARE EDUCATION/TRAINING PROGRAM

## 2022-06-27 PROCEDURE — 77067 SCR MAMMO BI INCL CAD: CPT | Mod: TC

## 2022-07-21 ENCOUNTER — HOSPITAL ENCOUNTER (OUTPATIENT)
Dept: RADIOLOGY | Facility: HOSPITAL | Age: 87
Discharge: HOME | End: 2022-07-21
Attending: NURSE PRACTITIONER
Payer: MEDICARE

## 2022-07-21 DIAGNOSIS — R92.8 ABNORMAL MAMMOGRAM: ICD-10-CM

## 2022-07-21 PROCEDURE — 77066 DX MAMMO INCL CAD BI: CPT | Mod: TC

## 2022-07-21 PROCEDURE — 76642 ULTRASOUND BREAST LIMITED: CPT | Mod: 26,50,, | Performed by: RADIOLOGY

## 2022-07-21 PROCEDURE — 77066 MAMMO DIGITAL DIAGNOSTIC BILAT WITH TOMO: ICD-10-PCS | Mod: 26,,, | Performed by: RADIOLOGY

## 2022-07-21 PROCEDURE — 77066 DX MAMMO INCL CAD BI: CPT | Mod: 26,,, | Performed by: RADIOLOGY

## 2022-07-21 PROCEDURE — 76642 PR US BREAST UNILAT LIMITED: ICD-10-PCS | Mod: 26,50,, | Performed by: RADIOLOGY

## 2022-07-21 PROCEDURE — 77062 MAMMO DIGITAL DIAGNOSTIC BILAT WITH TOMO: ICD-10-PCS | Mod: 26,,, | Performed by: RADIOLOGY

## 2022-07-21 PROCEDURE — 77062 BREAST TOMOSYNTHESIS BI: CPT | Mod: 26,,, | Performed by: RADIOLOGY

## 2022-07-21 PROCEDURE — 76642 ULTRASOUND BREAST LIMITED: CPT | Mod: TC,50

## 2022-08-03 ENCOUNTER — HOSPITAL ENCOUNTER (OUTPATIENT)
Dept: RADIOLOGY | Facility: HOSPITAL | Age: 87
Discharge: HOME | End: 2022-08-03
Attending: NURSE PRACTITIONER
Payer: MEDICARE

## 2022-08-03 VITALS — WEIGHT: 243 LBS | BODY MASS INDEX: 41.48 KG/M2 | HEIGHT: 64 IN

## 2022-08-03 DIAGNOSIS — R92.8 ABNORMAL MAMMOGRAM: ICD-10-CM

## 2022-08-03 DIAGNOSIS — R92.8 ABNORMAL MAMMOGRAM: Primary | ICD-10-CM

## 2022-08-03 PROCEDURE — 77065 DX MAMMO INCL CAD UNI: CPT | Mod: 26,RT,, | Performed by: STUDENT IN AN ORGANIZED HEALTH CARE EDUCATION/TRAINING PROGRAM

## 2022-08-03 PROCEDURE — 77061 BREAST TOMOSYNTHESIS UNI: CPT | Mod: 26,RT,, | Performed by: STUDENT IN AN ORGANIZED HEALTH CARE EDUCATION/TRAINING PROGRAM

## 2022-08-03 PROCEDURE — 27000549 US BREAST BIOPSY WITH IMAGING 1ST SITE RIGHT

## 2022-08-03 PROCEDURE — 19083 BX BREAST 1ST LESION US IMAG: CPT | Mod: RT,,, | Performed by: STUDENT IN AN ORGANIZED HEALTH CARE EDUCATION/TRAINING PROGRAM

## 2022-08-03 PROCEDURE — 19083 BX BREAST 1ST LESION US IMAG: CPT | Mod: RT

## 2022-08-03 PROCEDURE — 77065 DX MAMMO INCL CAD UNI: CPT | Mod: TC,RT

## 2022-08-03 PROCEDURE — 77061 MAMMO DIGITAL DIAGNOSTIC RIGHT WITH TOMO: ICD-10-PCS | Mod: 26,RT,, | Performed by: STUDENT IN AN ORGANIZED HEALTH CARE EDUCATION/TRAINING PROGRAM

## 2022-08-03 PROCEDURE — 19083 US BREAST BIOPSY WITH IMAGING 1ST SITE RIGHT: ICD-10-PCS | Mod: RT,,, | Performed by: STUDENT IN AN ORGANIZED HEALTH CARE EDUCATION/TRAINING PROGRAM

## 2022-08-03 PROCEDURE — 77065 MAMMO DIGITAL DIAGNOSTIC RIGHT WITH TOMO: ICD-10-PCS | Mod: 26,RT,, | Performed by: STUDENT IN AN ORGANIZED HEALTH CARE EDUCATION/TRAINING PROGRAM

## 2022-08-22 LAB
DHEA SERPL-MCNC: NORMAL
ESTRIOL SERPL-MCNC: NORMAL NG/ML
ESTROGEN SERPL-MCNC: NORMAL PG/ML
INSULIN SERPL-ACNC: NORMAL U[IU]/ML
LAB AP CLINICAL INFORMATION: NORMAL
LAB AP GROSS DESCRIPTION: NORMAL
LAB AP REPORT FOOTNOTES: NORMAL
T3RU NFR SERPL: NORMAL %

## 2022-08-26 NOTE — PROGRESS NOTES
Subjective:       Patient ID: Margo Mishra is a 89 y.o. female.    Surgeon: Dr. Lazaro Prado  Primary Care: Dr. Dorian Snow     1. 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   Imagin. Screening MMG 22--focal asymmetries in both breasts need further evaluation.  2. Diagnostic bilateral MMG/US done 22--Irregular, mixed echogenicity mass with irregular margins in the 11:30 right breast, 8 cm from the nipple, is suspicious for malignancy, measures [id number].  Right breast ultrasound-guided biopsy is recommended. Oval, mixed echogenicity masses with circumscribed margins in the 11:00 right breast, 9 cm from the nipple, 1:00 left breast, 9 cm from the nipple, and 3:00 left breast, 7 cm from the nipple, are favored to represent fat necrosis and as such are considered probably benign.     2. Left Breast Cancer Stage IA ([id number]) diagnosed 3/30/17  Biopsy/pathology: Left breast US-guided biopsy done 3/30/17--microinvasive solid papillary carcinoma, grade 2, size cannot be determined, DCIS present, no lymphovascular invasion, ER 95.3%, AL 60.5%, Her2 1+ by IHC, Ki67 13.6%.  Surgery/pathology:   Left breast lumpectomy and SLN biopsy done 17--solid papillary carcinoma, greatest dimension of residual tumor 0.5cm, grade 2, no lymphovascular invasion, margins clear, 1 SLN negative.  Imagin. Bilateral screening MMG 16--persistent asymmetry left breast, right breast good.   2. Left breast diagnostic MMG/US 16--Left breast 3:00 sonographic hypoechoic mass, minimally increased in size from 2015. Follow-up recommended in 6 months.  3. Attempted stereotactic biopsy done 16--patient could not lay prone.  4. Unilateral MMG and Left breast US 3/16/17--slightly irregular lesion 3:00 left  breast, BIRADS 4, biopsy recommended.     DEXA:  1. 17 demonstrated osteoporosis in left hip and osteopenia to AP spine and right hip.   2. 19--AP spine -1.3 (was -1.2). Left neck -2.5, Left total -1.4. Right neck -1.7, Right total -1.6. Improved in hips and slight worsening in AP spine.   3. 21--Left neck -2.6, Left total -1.7, Right neck -2.0, Right total -1.7. Left forearm -2.4. Worsening osteoporosis/osteopenia.      Procedures:  Colonoscopy 18 Dr. Higginbotham--Decreased sphincter tone found on digital rectal exam, patent end-to-side ileo-colonic anastomosis, characterized by healthy  appearing mucosa, three 4 to 6 mm polyps in the mid sigmoid colon and in the proximal transverse colon, removed with a hot snare, resected and retrieved--pathology c/w tubular adenoma.     2. 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   Biopsy/Pathology:  1. S/p CT guided biopsy of Left paraspinal mass on 19--Boundary spindled cell neoplasm consistent with schwannoma (neurilemmoma).     Imagin. CT A/P with contrast 18--Wall thickening of the distal rectum could relate to a proctitis but correlation with endoscopy is recommended to exclude mass, complex ventral abdominal wall hernia containing noncompromised  small and large bowel loops, indeterminate cystic structure superior to the vaginal cuff, possibly adnexal. Suggest nonemergent outpatient pelvic ultrasound to further characterize, right renal lesion not convincingly a cyst. Outpatient ultrasound characterization suggested for this finding as well.  2. US retroperitoneum 18--A 2.3 cm hypoechoic area within the right kidney corresponds with the lesion seen by CT. It lacks definitive features of a cyst.  3. CT A/P w & w/o on 18--The right lower pole renal lesion remains indeterminate with equivocal enhancement but size is stable going back to 2018. Annual CT surveillance would probably be reasonable given stability over  5 months. The cystic lesion of the vaginal cuff is also stable.  4. Pelvic US on 5/22/18--Vaginal cuff cystic structure demonstrates no convincing solid component or vascularity. Size is similar to January.  5.MRI thoracic spine w/o contrast 1/26/19--Numerous lesions within the thoracic spine which do not appear expansile but which appear rather diffuse for intraosseous hemangiomata, contrast-enhanced study would be of value for further  characterization as I cannot exclude neoplastic deposits here, 2.4 cm well marginated left paraspinous mass. CT with and without contrast would be of value for further characterization.  6. CT thoracic spine w/ and w/o contrast done 1/31/19--Enhancing left paraspinous mass which is nonspecific but could reflect neoplasia such as nerve sheath tumor, ganglioneuroma, or even metastatic deposit among other possibilities, altered bony mineralization which may reflect aggressive decreased bony mineralization with numerous hemangiomata within the thoracic spine, enlarged right thyroid lobe with nodularity. Thyroid sonography may b of benefit as clinically indicated.  7. CT of C/A/P on 2/11/19--2.5 cm x 1.5 cm pleural based paraspinous mass in the left posterior mediastinum at the T4 level which may be contiguous with the adjacent nerve root. Differential considerations include peripheral nerve sheath tumor and pleural-based metastasis. This may be further evaluated with PET scan if clinically indicated. 10 mm groundglass opacity in the left upper lobe. 3 mm groundglass opacity in the lateral segment of the right middle lobe. The appearance is not specific for early pulmonary metastasis but it cannot be excluded. Subcarinal cystlike structure which measures 15 mm in short axis and is favored to represent a pericardial reflection or trace pericardial fluid. Stable low-attenuation structures in the liver. Mild splenomegaly. 3 cm x 2 cm Bosniak type III cyst present in the lower pole the right  kidney. The appearance confers a small but significant risk of malignancy and may be further evaluated with continued CT surveillance or ultrasonography if clinically indicated. Low-attenuation lesions in the thoracic spine demonstrate the characteristic CT appearance of hemangiomas. Stable sclerotic foci in the right sacral alar and the left pubic bone are nonspecific but could represent metastatic disease. Bone scan may be used for further evaluation if concern persists.  8. CT C/A/P 7/2/19--stable paraspinous mass, unchanged pulmonary nodules, favor benign etiology, no changed in right renal lower pole cystic lesion, suggesting benign entity, diffuse osseous demineralization and regional degenerative changes, no acute or aggressive skeletal abnormality.    Treatment history:  1. Left breast lumpectomy 4/26/17.  2. Patient declined radiation.  3. Femara 5/15/17--stopped 8/2022.       Treatment plan: Prolia every 6 months started 11/6/17. Due for next December 2022    Chief Complaint: Other Misc (Pt reports no new concerns today.)    HPI  Patient presents for evaluation for new breast lymphoma. She was found on MMG to have a new mass in right breast measuring 24mm. 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 Patient denies any B-symptoms. She is otherwise doing okay. She has RA followed by Dr. Selby and is on MTX and Remicade. Discussed that we may need to discontinue the Remicade.     History reviewed. No pertinent past medical history.   Review of patient's allergies indicates:   Allergen Reactions    Codeine      Other reaction(s): Agitation, Confusion      Current Outpatient Medications on File Prior to Visit   Medication Sig Dispense Refill    letrozole (FEMARA) 2.5 mg Tab TAKE 1 TABLET BY MOUTH DAILY 30 tablet 6    amLODIPine (NORVASC) 2.5 MG tablet Take 2.5 mg by mouth once daily.      cinacalcet (SENSIPAR) 30 MG Tab       ergocalciferol  (ERGOCALCIFEROL) 50,000 unit Cap Take 50,000 Units by mouth twice a week.      folic acid (FOLVITE) 1 MG tablet Take 1,000 mcg by mouth once daily.      gabapentin (NEURONTIN) 300 MG capsule Take 300 mg by mouth 3 (three) times daily.      HYDROcodone-acetaminophen (NORCO)  mg per tablet Take 1 tablet by mouth 2 (two) times daily.      methotrexate 2.5 MG Tab TAKE 3 TABLET BY MOUTH EVERY WEEK      TRELEGY ELLIPTA 100-62.5-25 mcg DsDv Take 1 puff by mouth once daily.       No current facility-administered medications on file prior to visit.      Review of Systems   Constitutional:  Negative for appetite change, fatigue, fever and unexpected weight change.   HENT:  Negative for mouth sores.    Eyes: Negative.    Respiratory:  Negative for cough and shortness of breath.    Cardiovascular:  Negative for chest pain and leg swelling.   Gastrointestinal:  Negative for abdominal distention, abdominal pain, constipation, diarrhea, nausea, vomiting and reflux.   Genitourinary:  Negative for difficulty urinating, dysuria and hematuria.   Musculoskeletal:  Positive for arthralgias. Negative for back pain.        Joint deformities from RA, in a wheelchair   Integumentary:  Negative for rash.   Neurological:  Negative for weakness and headaches.   Hematological:  Negative for adenopathy.   Psychiatric/Behavioral:  Negative for sleep disturbance. The patient is not nervous/anxious.             Physical Exam  Constitutional:       Appearance: Normal appearance.      Comments: Elderly white female   HENT:      Head: Normocephalic.      Nose: Nose normal.      Mouth/Throat:      Mouth: Mucous membranes are moist.   Eyes:      Extraocular Movements: Extraocular movements intact.      Conjunctiva/sclera: Conjunctivae normal.   Cardiovascular:      Rate and Rhythm: Normal rate and regular rhythm.   Pulmonary:      Effort: Pulmonary effort is normal.      Breath sounds: Normal breath sounds.   Chest:      Comments: Right breast  with no palpable mass, left breast with lumpectomy incision healed well, no masses. No axillary adenopathy  Abdominal:      General: Bowel sounds are normal. There is no distension.      Palpations: Abdomen is soft.      Tenderness: There is no abdominal tenderness.   Musculoskeletal:      Comments: Joint deformities in hands c/w RA changes, in a wheelchair   Lymphadenopathy:      Comments: No palpable adenopathy   Skin:     General: Skin is warm.   Neurological:      General: No focal deficit present.      Mental Status: She is alert and oriented to person, place, and time.   Psychiatric:         Mood and Affect: Mood normal.         Judgment: Judgment normal.       No visits with results within 2 Week(s) from this visit.   Latest known visit with results is:   Hospital Outpatient Visit on 08/03/2022   Component Date Value    Case Report 08/03/2022                      Value:Surgical Pathology                                Case: [id number]                                 Authorizing Provider:  Avery Olea MD       Collected:           08/03/2022 11:43 AM          Ordering Location:     [facility]  Received:            08/04/2022 01:24 PM                                 - [facility] Ultrasound                                                   Pathologist:           Shady Haque MD                                                           Specimen:    Breast, Right, right breast 11:30 8cmfn                                                    Supplemental Report 08/03/2022                      Value:This result contains rich text formatting which cannot be displayed here.    Final Diagnosis 08/03/2022                      Value:This result contains rich text formatting which cannot be displayed here.    Comments 08/03/2022                      Value:This result contains rich text formatting which cannot be displayed here.    Clinical Information 08/03/2022                      Value:This  "result contains rich text formatting which cannot be displayed here.    Microscopic Description 08/03/2022                      Value:This result contains rich text formatting which cannot be displayed here.    Gross Description 08/03/2022                      Value:This result contains rich text formatting which cannot be displayed here.    Report Footnotes 08/03/2022                      Value:This result contains rich text formatting which cannot be displayed here.            Assessment:       1. History of left breast cancer    2. B-cell lymphoma of extranodal or solid organ site         Plan:       Patient with breast cancer stage IA s/p lumpectomy done 4/26/17. ER/ID positive and Her 2 negative.  Tumor was small 0.5cm although may have been larger but no way to tell since there was not a measurement from the biopsy.  Per NCCN guidelines, no chemotherapy recommended due to small size of tumor.  Radiaton offered but patient refused.    On 2/19/19 she had a CT guided left paraspinal mass with results consistent with schwannoma (neurilemomma).   Recommended referral to Neurosurgery for evaluation but she refused since back pain controlled.  Repeat CT C/A/P in 7/2029 showed stable benign pulmonary nodules, no aggressive bone lesion, stable benign parapsinous mass and stable right renal cyst. No need to repeat unless any new symptoms. Outside imaging showed stable paraspinal mass.   Recommended referral to urology for surveillance for renal cyst but she refused this as well and says it has been there for "years".  She quit smoking over 30 years ago so she does not meet criteria for screening CT chest.     She has h/o colon cancer vs. dysplastic polyp in cecum in 2005--colonoscopy was in 2013 Dr. Nelson, 2 polyps removed, both tubular adenomas. Not able to get pathology records from original surgery.  Most recent colonoscopy done 1/2018 showed also polyps c/w tubular adnenomas.     Treatment with AI alone recommended " and patient started Femara on 5/15/17.  DEXA from 2017 showed osteoporosis. Patient started on Prolia.  DEXA continues to decline, most recent in 9/2021 was worse.  Recommended for patient to stop Femara in 5/2022, but she did not stop yet.  I have instructed her to stop now.  Continue Prolia. Next dose due 12/2022. Plan to stop Prolia after 12/2022 since it will be 5 years.    Calcium previously stopped due to diagnosis of primary hyperparathyroidism. She is taking high dose Vitamin D as she does not desire any surgery. Closely monitored by Dr. Escalera.     Now with new diagnosis of right breast B-cell lymphoma, mostly c/w marginal zone lymphoma.  She has no B-symptoms and no palpable adenopathy.  Will check labs today including LDH.  Will obtain PET/CT for complete staging.  If only in breast, plan to offer radiation alone.  Also will need to discuss with Rheumatology Dr. Selby, about possibly stopping Remicade.    All questions answered.          Yenifer John MD      Therapy Plan Information  Medications  denosumab (PROLIA) injection 60 mg  60 mg, Subcutaneous, Every 26 weeks

## 2022-08-29 ENCOUNTER — OFFICE VISIT (OUTPATIENT)
Dept: HEMATOLOGY/ONCOLOGY | Facility: CLINIC | Age: 87
End: 2022-08-29
Payer: MEDICARE

## 2022-08-29 VITALS
SYSTOLIC BLOOD PRESSURE: 139 MMHG | OXYGEN SATURATION: 95 % | TEMPERATURE: 98 F | WEIGHT: 252.19 LBS | HEART RATE: 58 BPM | RESPIRATION RATE: 14 BRPM | DIASTOLIC BLOOD PRESSURE: 82 MMHG | BODY MASS INDEX: 42.02 KG/M2 | HEIGHT: 65 IN

## 2022-08-29 DIAGNOSIS — Z85.3 HISTORY OF LEFT BREAST CANCER: ICD-10-CM

## 2022-08-29 DIAGNOSIS — C85.19 B-CELL LYMPHOMA OF EXTRANODAL OR SOLID ORGAN SITE: ICD-10-CM

## 2022-08-29 LAB
ALBUMIN SERPL-MCNC: 3.8 GM/DL (ref 3.4–4.8)
ALBUMIN/GLOB SERPL: 1.1 RATIO (ref 1.1–2)
ALP SERPL-CCNC: 80 UNIT/L (ref 40–150)
ALT SERPL-CCNC: 11 UNIT/L (ref 0–55)
AST SERPL-CCNC: 20 UNIT/L (ref 5–34)
BASOPHILS # BLD AUTO: 0.05 X10(3)/MCL (ref 0–0.2)
BASOPHILS NFR BLD AUTO: 0.8 %
BILIRUBIN DIRECT+TOT PNL SERPL-MCNC: 1.5 MG/DL
BUN SERPL-MCNC: 12.8 MG/DL (ref 9.8–20.1)
CALCIUM SERPL-MCNC: 8.4 MG/DL (ref 8.4–10.2)
CHLORIDE SERPL-SCNC: 108 MMOL/L (ref 98–111)
CO2 SERPL-SCNC: 27 MMOL/L (ref 23–31)
CREAT SERPL-MCNC: 0.9 MG/DL (ref 0.55–1.02)
EOSINOPHIL # BLD AUTO: 0.13 X10(3)/MCL (ref 0–0.9)
EOSINOPHIL NFR BLD AUTO: 2 %
ERYTHROCYTE [DISTWIDTH] IN BLOOD BY AUTOMATED COUNT: 13.1 % (ref 11.5–17)
GFR SERPLBLD CREATININE-BSD FMLA CKD-EPI: >60 MLS/MIN/1.73/M2
GLOBULIN SER-MCNC: 3.5 GM/DL (ref 2.4–3.5)
GLUCOSE SERPL-MCNC: 87 MG/DL (ref 75–121)
HCT VFR BLD AUTO: 41.1 % (ref 37–47)
HGB BLD-MCNC: 13.2 GM/DL (ref 12–16)
IMM GRANULOCYTES # BLD AUTO: 0.02 X10(3)/MCL (ref 0–0.04)
IMM GRANULOCYTES NFR BLD AUTO: 0.3 %
LDH SERPL-CCNC: 231 U/L (ref 125–220)
LYMPHOCYTES # BLD AUTO: 1.63 X10(3)/MCL (ref 0.6–4.6)
LYMPHOCYTES NFR BLD AUTO: 25.4 %
MCH RBC QN AUTO: 30.3 PG (ref 27–31)
MCHC RBC AUTO-ENTMCNC: 32.1 MG/DL (ref 33–36)
MCV RBC AUTO: 94.5 FL (ref 80–94)
MONOCYTES # BLD AUTO: 0.66 X10(3)/MCL (ref 0.1–1.3)
MONOCYTES NFR BLD AUTO: 10.3 %
NEUTROPHILS # BLD AUTO: 3.9 X10(3)/MCL (ref 2.1–9.2)
NEUTROPHILS NFR BLD AUTO: 61.2 %
PLATELET # BLD AUTO: 213 X10(3)/MCL (ref 130–400)
PMV BLD AUTO: 9.3 FL (ref 7.4–10.4)
POTASSIUM SERPL-SCNC: 3.9 MMOL/L (ref 3.5–5.1)
PROT SERPL-MCNC: 7.3 GM/DL (ref 5.8–7.6)
RBC # BLD AUTO: 4.35 X10(6)/MCL (ref 4.2–5.4)
SODIUM SERPL-SCNC: 143 MMOL/L (ref 132–146)
WBC # SPEC AUTO: 6.4 X10(3)/MCL (ref 4.5–11.5)

## 2022-08-29 PROCEDURE — 99999 PR PBB SHADOW E&M-EST. PATIENT-LVL III: CPT | Mod: PBBFAC,,, | Performed by: INTERNAL MEDICINE

## 2022-08-29 PROCEDURE — 83615 LACTATE (LD) (LDH) ENZYME: CPT | Performed by: INTERNAL MEDICINE

## 2022-08-29 PROCEDURE — 99215 PR OFFICE/OUTPT VISIT, EST, LEVL V, 40-54 MIN: ICD-10-PCS | Mod: S$PBB,,, | Performed by: INTERNAL MEDICINE

## 2022-08-29 PROCEDURE — 85025 COMPLETE CBC W/AUTO DIFF WBC: CPT | Performed by: INTERNAL MEDICINE

## 2022-08-29 PROCEDURE — 99213 OFFICE O/P EST LOW 20 MIN: CPT | Mod: PBBFAC | Performed by: INTERNAL MEDICINE

## 2022-08-29 PROCEDURE — 80053 COMPREHEN METABOLIC PANEL: CPT | Performed by: INTERNAL MEDICINE

## 2022-08-29 PROCEDURE — 99999 PR PBB SHADOW E&M-EST. PATIENT-LVL III: ICD-10-PCS | Mod: PBBFAC,,, | Performed by: INTERNAL MEDICINE

## 2022-08-29 PROCEDURE — 36415 COLL VENOUS BLD VENIPUNCTURE: CPT | Performed by: INTERNAL MEDICINE

## 2022-08-29 PROCEDURE — 99215 OFFICE O/P EST HI 40 MIN: CPT | Mod: S$PBB,,, | Performed by: INTERNAL MEDICINE

## 2022-08-29 RX ORDER — METHOTREXATE 2.5 MG/1
TABLET ORAL
COMMUNITY
Start: 2022-06-03

## 2022-08-29 RX ORDER — HYDROCODONE BITARTRATE AND ACETAMINOPHEN 10; 325 MG/1; MG/1
1 TABLET ORAL 2 TIMES DAILY
COMMUNITY
Start: 2022-06-15 | End: 2022-11-09

## 2022-08-29 RX ORDER — GABAPENTIN 300 MG/1
300 CAPSULE ORAL 3 TIMES DAILY
COMMUNITY
Start: 2022-04-26

## 2022-08-29 RX ORDER — FOLIC ACID 1 MG/1
1000 TABLET ORAL DAILY
COMMUNITY
Start: 2022-08-02

## 2022-08-29 RX ORDER — FLUTICASONE FUROATE, UMECLIDINIUM BROMIDE AND VILANTEROL TRIFENATATE 100; 62.5; 25 UG/1; UG/1; UG/1
1 POWDER RESPIRATORY (INHALATION) DAILY
COMMUNITY
Start: 2022-08-25

## 2022-08-29 RX ORDER — ERGOCALCIFEROL 1.25 MG/1
50000 CAPSULE ORAL
COMMUNITY
Start: 2022-08-09

## 2022-08-29 RX ORDER — CINACALCET 30 MG/1
TABLET, FILM COATED ORAL
COMMUNITY
Start: 2022-06-24

## 2022-08-29 RX ORDER — AMLODIPINE BESYLATE 2.5 MG/1
2.5 TABLET ORAL DAILY
COMMUNITY
Start: 2022-08-02

## 2022-09-08 ENCOUNTER — HOSPITAL ENCOUNTER (OUTPATIENT)
Dept: RADIOLOGY | Facility: HOSPITAL | Age: 87
Discharge: HOME | End: 2022-09-08
Attending: INTERNAL MEDICINE
Payer: MEDICARE

## 2022-09-08 DIAGNOSIS — C85.19 B-CELL LYMPHOMA OF EXTRANODAL OR SOLID ORGAN SITE: ICD-10-CM

## 2022-09-08 DIAGNOSIS — Z85.3 HISTORY OF LEFT BREAST CANCER: ICD-10-CM

## 2022-09-08 PROCEDURE — 78815 PET IMAGE W/CT SKULL-THIGH: CPT | Mod: TC

## 2022-09-15 NOTE — PROGRESS NOTES
Subjective:       Patient ID: Margo Mishra is a 89 y.o. female.    Surgeon: Dr. Lazaro Prado  Primary Care: Dr. Dorian Snow     1. 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          2. Left Breast Cancer Stage IA ([id number]) diagnosed 3/30/17  Biopsy/pathology: Left breast US-guided biopsy done 3/30/17--microinvasive solid papillary carcinoma, grade 2, size cannot be determined, DCIS present, no lymphovascular invasion, ER 95.3%, NJ 60.5%, Her2 1+ by IHC, Ki67 13.6%.  Surgery/pathology:   Left breast lumpectomy and SLN biopsy done 17--solid papillary carcinoma, greatest dimension of residual tumor 0.5cm, grade 2, no lymphovascular invasion, margins clear, 1 SLN negative.  Imagin. Bilateral screening MMG 16--persistent asymmetry left breast, right breast good.   2. Left breast diagnostic MMG/US 16--Left breast 3:00 sonographic hypoechoic mass, minimally increased in size from 2015. Follow-up recommended in 6 months.  3. Attempted stereotactic biopsy done 16--patient could not lay prone.  4. Unilateral MMG and Left breast US 3/16/17--slightly irregular lesion 3:00 left breast, BIRADS 4, biopsy recommended.     DEXA:  1. 17 demonstrated osteoporosis in left hip and osteopenia to AP spine and right hip.   2. 19--AP spine -1.3 (was -1.2). Left neck -2.5, Left total -1.4. Right neck -1.7, Right total -1.6. Improved in hips and slight worsening in AP spine.   3. 21--Left neck -2.6, Left total -1.7, Right neck -2.0, Right total -1.7. Left forearm -2.4. Worsening osteoporosis/osteopenia.      Procedures:  Colonoscopy 18 Dr. Higginbotham--Decreased sphincter tone found on digital rectal exam, patent end-to-side ileo-colonic anastomosis, characterized by healthy  appearing mucosa, three  4 to 6 mm polyps in the mid sigmoid colon and in the proximal transverse colon, removed with a hot snare, resected and retrieved--pathology c/w tubular adenoma.     2. 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   Biopsy/Pathology:  1. S/p CT guided biopsy of Left paraspinal mass on 19--Collier spindled cell neoplasm consistent with schwannoma (neurilemmoma).     Imagin. CT A/P with contrast 18--Wall thickening of the distal rectum could relate to a proctitis but correlation with endoscopy is recommended to exclude mass, complex ventral abdominal wall hernia containing noncompromised  small and large bowel loops, indeterminate cystic structure superior to the vaginal cuff, possibly adnexal. Suggest nonemergent outpatient pelvic ultrasound to further characterize, right renal lesion not convincingly a cyst. Outpatient ultrasound characterization suggested for this finding as well.  2. US retroperitoneum 18--A 2.3 cm hypoechoic area within the right kidney corresponds with the lesion seen by CT. It lacks definitive features of a cyst.  3. CT A/P w & w/o on 18--The right lower pole renal lesion remains indeterminate with equivocal enhancement but size is stable going back to 2018. Annual CT surveillance would probably be reasonable given stability over 5 months. The cystic lesion of the vaginal cuff is also stable.  4. Pelvic US on 18--Vaginal cuff cystic structure demonstrates no convincing solid component or vascularity. Size is similar to January.  5.MRI thoracic spine w/o contrast 19--Numerous lesions within the thoracic spine which do not appear expansile but which appear rather diffuse for intraosseous hemangiomata, contrast-enhanced study would be of value for further  characterization as I cannot exclude neoplastic deposits here, 2.4 cm well marginated left paraspinous mass. CT with and without contrast would be of value for further characterization.  6. CT  thoracic spine w/ and w/o contrast done 1/31/19--Enhancing left paraspinous mass which is nonspecific but could reflect neoplasia such as nerve sheath tumor, ganglioneuroma, or even metastatic deposit among other possibilities, altered bony mineralization which may reflect aggressive decreased bony mineralization with numerous hemangiomata within the thoracic spine, enlarged right thyroid lobe with nodularity. Thyroid sonography may b of benefit as clinically indicated.  7. CT of C/A/P on 2/11/19--2.5 cm x 1.5 cm pleural based paraspinous mass in the left posterior mediastinum at the T4 level which may be contiguous with the adjacent nerve root. Differential considerations include peripheral nerve sheath tumor and pleural-based metastasis. This may be further evaluated with PET scan if clinically indicated. 10 mm groundglass opacity in the left upper lobe. 3 mm groundglass opacity in the lateral segment of the right middle lobe. The appearance is not specific for early pulmonary metastasis but it cannot be excluded. Subcarinal cystlike structure which measures 15 mm in short axis and is favored to represent a pericardial reflection or trace pericardial fluid. Stable low-attenuation structures in the liver. Mild splenomegaly. 3 cm x 2 cm Bosniak type III cyst present in the lower pole the right kidney. The appearance confers a small but significant risk of malignancy and may be further evaluated with continued CT surveillance or ultrasonography if clinically indicated. Low-attenuation lesions in the thoracic spine demonstrate the characteristic CT appearance of hemangiomas. Stable sclerotic foci in the right sacral alar and the left pubic bone are nonspecific but could represent metastatic disease. Bone scan may be used for further evaluation if concern persists.  8. CT C/A/P 7/2/19--stable paraspinous mass, unchanged pulmonary nodules, favor benign etiology, no changed in right renal lower pole cystic lesion,  suggesting benign entity, diffuse osseous demineralization and regional degenerative changes, no acute or aggressive skeletal abnormality.  9. Screening MMG 6/27/22--focal asymmetries in both breasts need further evaluation.  10. Diagnostic bilateral MMG/US done 6/29/22--Irregular, mixed echogenicity mass with irregular margins in the 11:30 right breast, 8 cm from the nipple, is suspicious for malignancy, measures [id number].  Right breast ultrasound-guided biopsy is recommended. Oval, mixed echogenicity masses with circumscribed margins in the 11:00 right breast, 9 cm from the nipple, 1:00 left breast, 9 cm from the nipple, and 3:00 left breast, 7 cm from the nipple, are favored to represent fat necrosis and as such are considered probably benign.   11. PET/CT 9/8/22--Mild, sub threshold uptake at the right breast nodule, subtle subcutaneous nodular densities at the arm, chest and abdomen/pelvis are nonspecific, subtle associated FDG avidity.  For example at the right upper arm nodule max uptake is 3.  Appearance is nonspecific.  These lesions could be related to areas of fat necrosis.  However in a patient with history of extranodal lymphoma at the right breast, additional foci of lymphomatous disease could conceivably have this appearance, ovoid left thoracic paraspinal lesion compatible with biopsy-proven schwannoma. Per literature review benign schwannoma can have moderate FDG avidity similar to this lesion.    Treatment history:  1. Left breast lumpectomy 4/26/17.  2. Patient declined radiation.  3. Femara 5/15/17--stopped 8/2022.       Treatment plan: Prolia every 6 months started 11/6/17. Due for next 12/2022    Chief Complaint: Other Misc (Pt states that she is SOB but forgot to take her breathing tx this AM.)    HPI  Patient presents for evaluation for new breast lymphoma. PET shows breast lesion, and also a few nonspecific subcutaneous lesions, and the known schwannoma. Discussed with patient  recommendation for radiation and also to hold her Remicade for now that she is taking for RA under the care of Dr. Selby.    History reviewed. No pertinent past medical history.   Review of patient's allergies indicates:   Allergen Reactions    Codeine      Other reaction(s): Agitation, Confusion      Current Outpatient Medications on File Prior to Visit   Medication Sig Dispense Refill    amLODIPine (NORVASC) 2.5 MG tablet Take 2.5 mg by mouth once daily.      cinacalcet (SENSIPAR) 30 MG Tab       ergocalciferol (ERGOCALCIFEROL) 50,000 unit Cap Take 50,000 Units by mouth twice a week.      folic acid (FOLVITE) 1 MG tablet Take 1,000 mcg by mouth once daily.      gabapentin (NEURONTIN) 300 MG capsule Take 300 mg by mouth 3 (three) times daily.      HYDROcodone-acetaminophen (NORCO)  mg per tablet Take 1 tablet by mouth 2 (two) times daily.      letrozole (FEMARA) 2.5 mg Tab TAKE 1 TABLET BY MOUTH DAILY 30 tablet 6    methotrexate 2.5 MG Tab TAKE 3 TABLET BY MOUTH EVERY WEEK      TRELEGY ELLIPTA 100-62.5-25 mcg DsDv Take 1 puff by mouth once daily.       No current facility-administered medications on file prior to visit.      Review of Systems   Constitutional:  Negative for appetite change, fatigue, fever and unexpected weight change.   HENT:  Negative for mouth sores.    Eyes: Negative.    Respiratory:  Negative for cough and shortness of breath.    Cardiovascular:  Negative for chest pain and leg swelling.   Gastrointestinal:  Negative for abdominal distention, abdominal pain, constipation, diarrhea, nausea, vomiting and reflux.   Genitourinary:  Negative for difficulty urinating, dysuria and hematuria.   Musculoskeletal:  Positive for arthralgias. Negative for back pain.        Joint deformities from RA, in a wheelchair   Integumentary:  Negative for rash.        No palpable subcutaneous skin lesions as seen on PET.   Neurological:  Negative for weakness and headaches.   Hematological:  Negative for  adenopathy.   Psychiatric/Behavioral:  Negative for sleep disturbance. The patient is not nervous/anxious.        Vitals:    09/19/22 0932   BP: 137/71   Pulse: 64   Resp: 16   Temp: 97.9 °F (36.6 °C)        Physical Exam  Constitutional:       Appearance: Normal appearance.      Comments: Elderly white female   HENT:      Head: Normocephalic.      Nose: Nose normal.      Mouth/Throat:      Mouth: Mucous membranes are moist.   Eyes:      Extraocular Movements: Extraocular movements intact.      Conjunctiva/sclera: Conjunctivae normal.   Cardiovascular:      Rate and Rhythm: Normal rate and regular rhythm.   Pulmonary:      Effort: Pulmonary effort is normal.      Breath sounds: Normal breath sounds.   Chest:      Comments: Right breast with no palpable mass, left breast with lumpectomy incision healed well, no masses. No axillary adenopathy  Abdominal:      General: Bowel sounds are normal. There is no distension.      Palpations: Abdomen is soft.      Tenderness: There is no abdominal tenderness.   Musculoskeletal:      Comments: Joint deformities in hands c/w RA changes, in a wheelchair   Lymphadenopathy:      Comments: No palpable adenopathy   Skin:     General: Skin is warm.   Neurological:      General: No focal deficit present.      Mental Status: She is alert and oriented to person, place, and time.   Psychiatric:         Mood and Affect: Mood normal.         Judgment: Judgment normal.       No visits with results within 2 Week(s) from this visit.   Latest known visit with results is:   Office Visit on 08/29/2022   Component Date Value    Lactate Dehydrogenase 08/29/2022 231 (H)     Sodium Level 08/29/2022 143     Potassium Level 08/29/2022 3.9     Chloride 08/29/2022 108     Carbon Dioxide 08/29/2022 27     Glucose Level 08/29/2022 87     Blood Urea Nitrogen 08/29/2022 12.8     Creatinine 08/29/2022 0.90     Calcium Level Total 08/29/2022 8.4     Protein Total 08/29/2022 7.3     Albumin Level 08/29/2022 3.8   "   Globulin 08/29/2022 3.5     Albumin/Globulin Ratio 08/29/2022 1.1     Bilirubin Total 08/29/2022 1.5     Alkaline Phosphatase 08/29/2022 80     Alanine Aminotransferase 08/29/2022 11     Aspartate Aminotransfera* 08/29/2022 20     eGFR 08/29/2022 >60     WBC 08/29/2022 6.4     RBC 08/29/2022 4.35     Hgb 08/29/2022 13.2     Hct 08/29/2022 41.1     MCV 08/29/2022 94.5 (H)     MCH 08/29/2022 30.3     MCHC 08/29/2022 32.1 (L)     RDW 08/29/2022 13.1     Platelet 08/29/2022 213     MPV 08/29/2022 9.3     Neut % 08/29/2022 61.2     Lymph % 08/29/2022 25.4     Mono % 08/29/2022 10.3     Eos % 08/29/2022 2.0     Basophil % 08/29/2022 0.8     Lymph # 08/29/2022 1.63     Neut # 08/29/2022 3.9     Mono # 08/29/2022 0.66     Eos # 08/29/2022 0.13     Baso # 08/29/2022 0.05     IG# 08/29/2022 0.02     IG% 08/29/2022 0.3             Assessment:       1. B-cell lymphoma of extranodal or solid organ site           Plan:       Patient with breast cancer stage IA s/p lumpectomy done 4/26/17. ER/IN positive and Her 2 negative.  Tumor was small 0.5cm although may have been larger but no way to tell since there was not a measurement from the biopsy.  Per NCCN guidelines, no chemotherapy recommended due to small size of tumor.  Radiaton offered but patient refused.    On 2/19/19 she had a CT guided left paraspinal mass with results consistent with schwannoma (neurilemomma).   Recommended referral to Neurosurgery for evaluation but she refused since back pain controlled.  Repeat CT C/A/P in 7/2019 showed stable benign pulmonary nodules, no aggressive bone lesion, stable benign parapsinous mass and stable right renal cyst. No need to repeat unless any new symptoms. Outside imaging showed stable paraspinal mass.   Recommended referral to urology for surveillance for renal cyst but she refused this as well and says it has been there for "years".  She quit smoking over 30 years ago so she does not meet criteria for screening CT chest.     " She has h/o colon cancer vs. dysplastic polyp in cecum in 2005--colonoscopy was in 2013 Dr. Nelson, 2 polyps removed, both tubular adenomas. Not able to get pathology records from original surgery.  Most recent colonoscopy done 1/2018 showed also polyps c/w tubular adnenomas.     Treatment with AI alone recommended and patient started Femara on 5/15/17.  DEXA from 2017 showed osteoporosis. Patient started on Prolia.  DEXA continues to decline, most recent in 9/2021 was worse.  Recommended for patient to stop Femara in 5/2022, but she did not stop, stopped in 8/2022.   Continue Prolia. Next dose due 12/2022. Plan to stop Prolia after 12/2022 since it will be 5 years.    Calcium previously stopped due to diagnosis of primary hyperparathyroidism. She is taking high dose Vitamin D as she does not desire any surgery. Closely monitored by Dr. Escalera.     Now diagnosis of right breast B-cell lymphoma, 8/3/22, mostly c/w marginal zone lymphoma.  She has no B-symptoms and no palpable adenopathy. LDH elevated, but she also has known RA.  PET/CT 9/8/22 shows nonspecific subcutaneous lesions, right upper arm posterior, left upper back posterior and right posterior back as well as right trochanter hypermetabolism which may be arthritis, her known schwannoma also showed up on the PET.   Due to her advanced age, she does not really want any surgery.  Will refer to radiation oncology to consider radiation.  Plan to repeat PET/CT in 3-4 months after the radiation is done to keep a watch on the other lesions which are nonspecific and my index of suspicion is low for lymphoma.     Discussed with Rheumatology Dr. Selby, and plan to hold Remicade for now. Can possibly restart at a later date if it is affecting her QOL. Or consider changing to Humira?? Okay to continue MTX once radiation is done.  RTC 6 weeks for follow-up with repeat labs. Plan to order PET at next visit depending on when radiation is completed.     All questions  answered.          Yenifer John MD      Therapy Plan Information  Medications  denosumab (PROLIA) injection 60 mg  60 mg, Subcutaneous, Every 26 weeks

## 2022-09-19 ENCOUNTER — OFFICE VISIT (OUTPATIENT)
Dept: HEMATOLOGY/ONCOLOGY | Facility: CLINIC | Age: 87
End: 2022-09-19
Payer: MEDICARE

## 2022-09-19 VITALS
DIASTOLIC BLOOD PRESSURE: 71 MMHG | OXYGEN SATURATION: 97 % | HEART RATE: 64 BPM | BODY MASS INDEX: 42.02 KG/M2 | HEIGHT: 65 IN | RESPIRATION RATE: 16 BRPM | WEIGHT: 252.19 LBS | TEMPERATURE: 98 F | SYSTOLIC BLOOD PRESSURE: 137 MMHG

## 2022-09-19 DIAGNOSIS — C85.19 B-CELL LYMPHOMA OF EXTRANODAL OR SOLID ORGAN SITE: Primary | ICD-10-CM

## 2022-09-19 PROCEDURE — 99999 PR PBB SHADOW E&M-EST. PATIENT-LVL IV: CPT | Mod: PBBFAC,,, | Performed by: INTERNAL MEDICINE

## 2022-09-19 PROCEDURE — 99214 PR OFFICE/OUTPT VISIT, EST, LEVL IV, 30-39 MIN: ICD-10-PCS | Mod: S$PBB,,, | Performed by: INTERNAL MEDICINE

## 2022-09-19 PROCEDURE — 99214 OFFICE O/P EST MOD 30 MIN: CPT | Mod: PBBFAC | Performed by: INTERNAL MEDICINE

## 2022-09-19 PROCEDURE — 99214 OFFICE O/P EST MOD 30 MIN: CPT | Mod: S$PBB,,, | Performed by: INTERNAL MEDICINE

## 2022-09-19 PROCEDURE — 99999 PR PBB SHADOW E&M-EST. PATIENT-LVL IV: ICD-10-PCS | Mod: PBBFAC,,, | Performed by: INTERNAL MEDICINE

## 2022-09-26 ENCOUNTER — OFFICE VISIT (OUTPATIENT)
Dept: RADIATION THERAPY | Facility: HOSPITAL | Age: 87
End: 2022-09-26
Attending: RADIOLOGY
Payer: MEDICARE

## 2022-09-26 DIAGNOSIS — C85.19 B-CELL LYMPHOMA OF EXTRANODAL OR SOLID ORGAN SITE: ICD-10-CM

## 2022-09-26 PROCEDURE — 77334 RADIATION TREATMENT AID(S): CPT | Performed by: RADIOLOGY

## 2022-09-26 PROCEDURE — 77290 THER RAD SIMULAJ FIELD CPLX: CPT | Performed by: RADIOLOGY

## 2022-09-27 PROCEDURE — 77334 RADIATION TREATMENT AID(S): CPT | Performed by: RADIOLOGY

## 2022-09-27 PROCEDURE — 77300 RADIATION THERAPY DOSE PLAN: CPT | Performed by: RADIOLOGY

## 2022-09-28 PROCEDURE — 77307 TELETHX ISODOSE PLAN CPLX: CPT | Performed by: RADIOLOGY

## 2022-10-03 ENCOUNTER — OFFICE VISIT (OUTPATIENT)
Dept: RADIATION THERAPY | Facility: HOSPITAL | Age: 87
End: 2022-10-03
Attending: RADIOLOGY
Payer: MEDICARE

## 2022-10-03 PROCEDURE — 77412 RADIATION TX DELIVERY LVL 3: CPT | Performed by: RADIOLOGY

## 2022-10-03 PROCEDURE — 77280 THER RAD SIMULAJ FIELD SMPL: CPT | Performed by: RADIOLOGY

## 2022-10-04 PROCEDURE — 77412 RADIATION TX DELIVERY LVL 3: CPT | Performed by: RADIOLOGY

## 2022-10-04 PROCEDURE — 77387 GUIDANCE FOR RADJ TX DLVR: CPT | Performed by: RADIOLOGY

## 2022-10-05 PROCEDURE — 77412 RADIATION TX DELIVERY LVL 3: CPT | Performed by: RADIOLOGY

## 2022-10-06 PROCEDURE — 77412 RADIATION TX DELIVERY LVL 3: CPT | Performed by: RADIOLOGY

## 2022-10-07 PROCEDURE — 77412 RADIATION TX DELIVERY LVL 3: CPT | Performed by: RADIOLOGY

## 2022-10-10 PROCEDURE — 77412 RADIATION TX DELIVERY LVL 3: CPT | Performed by: RADIOLOGY

## 2022-10-10 PROCEDURE — 77336 RADIATION PHYSICS CONSULT: CPT | Performed by: RADIOLOGY

## 2022-10-11 PROCEDURE — 77412 RADIATION TX DELIVERY LVL 3: CPT | Performed by: RADIOLOGY

## 2022-10-12 PROCEDURE — 77412 RADIATION TX DELIVERY LVL 3: CPT | Performed by: RADIOLOGY

## 2022-10-13 PROCEDURE — 77412 RADIATION TX DELIVERY LVL 3: CPT | Performed by: RADIOLOGY

## 2022-10-14 PROCEDURE — 77412 RADIATION TX DELIVERY LVL 3: CPT | Performed by: RADIOLOGY

## 2022-10-17 PROCEDURE — 77336 RADIATION PHYSICS CONSULT: CPT | Performed by: RADIOLOGY

## 2022-10-17 PROCEDURE — 77412 RADIATION TX DELIVERY LVL 3: CPT | Performed by: RADIOLOGY

## 2022-10-18 PROCEDURE — 77412 RADIATION TX DELIVERY LVL 3: CPT | Performed by: RADIOLOGY

## 2022-10-25 NOTE — PROGRESS NOTES
Subjective:       Patient ID: Margo Mishra is a 89 y.o. female.    Surgeon: Dr. Lazaro Prado  Primary Care: Dr. Dorian Snow     1. 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          2. Left Breast Cancer Stage IA ([id number]) diagnosed 3/30/17  Biopsy/pathology: Left breast US-guided biopsy done 3/30/17--microinvasive solid papillary carcinoma, grade 2, size cannot be determined, DCIS present, no lymphovascular invasion, ER 95.3%, [place] 60.5%, Her2 1+ by IHC, Ki67 13.6%.  Surgery/pathology:   Left breast lumpectomy and SLN biopsy done 17--solid papillary carcinoma, greatest dimension of residual tumor 0.5cm, grade 2, no lymphovascular invasion, margins clear, 1 SLN negative.  Imagin. Bilateral screening MMG 16--persistent asymmetry left breast, right breast good.   2. Left breast diagnostic MMG/US 16--Left breast 3:00 sonographic hypoechoic mass, minimally increased in size from 2015. Follow-up recommended in 6 months.  3. Attempted stereotactic biopsy done 16--patient could not lay prone.  4. Unilateral MMG and Left breast US 3/16/17--slightly irregular lesion 3:00 left breast, BIRADS 4, biopsy recommended.     DEXA:  1. 17 demonstrated osteoporosis in left hip and osteopenia to AP spine and right hip.   2. 19--AP spine -1.3 (was -1.2). Left neck -2.5, Left total -1.4. Right neck -1.7, Right total -1.6. Improved in hips and slight worsening in AP spine.   3. 21--Left neck -2.6, Left total -1.7, Right neck -2.0, Right total -1.7. Left forearm -2.4. Worsening osteoporosis/osteopenia.      Procedures:  Colonoscopy 18 Dr. Higginbotham--Decreased sphincter tone found on digital rectal exam, patent end-to-side ileo-colonic anastomosis, characterized by healthy  appearing mucosa, three  4 to 6 mm polyps in the mid sigmoid colon and in the proximal transverse colon, removed with a hot snare, resected and retrieved--pathology c/w tubular adenoma.     2. 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   Biopsy/Pathology:  1. S/p CT guided biopsy of Left paraspinal mass on 19--Amite spindled cell neoplasm consistent with schwannoma (neurilemmoma).     Imagin. CT A/P with contrast 18--Wall thickening of the distal rectum could relate to a proctitis but correlation with endoscopy is recommended to exclude mass, complex ventral abdominal wall hernia containing noncompromised  small and large bowel loops, indeterminate cystic structure superior to the vaginal cuff, possibly adnexal. Suggest nonemergent outpatient pelvic ultrasound to further characterize, right renal lesion not convincingly a cyst. Outpatient ultrasound characterization suggested for this finding as well.  2. US retroperitoneum 18--A 2.3 cm hypoechoic area within the right kidney corresponds with the lesion seen by CT. It lacks definitive features of a cyst.  3. CT A/P w & w/o on 18--The right lower pole renal lesion remains indeterminate with equivocal enhancement but size is stable going back to 2018. Annual CT surveillance would probably be reasonable given stability over 5 months. The cystic lesion of the vaginal cuff is also stable.  4. Pelvic US on 18--Vaginal cuff cystic structure demonstrates no convincing solid component or vascularity. Size is similar to January.  5.MRI thoracic spine w/o contrast 19--Numerous lesions within the thoracic spine which do not appear expansile but which appear rather diffuse for intraosseous hemangiomata, contrast-enhanced study would be of value for further  characterization as I cannot exclude neoplastic deposits here, 2.4 cm well marginated left paraspinous mass. CT with and without contrast would be of value for further characterization.  6. CT  thoracic spine w/ and w/o contrast done 1/31/19--Enhancing left paraspinous mass which is nonspecific but could reflect neoplasia such as nerve sheath tumor, ganglioneuroma, or even metastatic deposit among other possibilities, altered bony mineralization which may reflect aggressive decreased bony mineralization with numerous hemangiomata within the thoracic spine, enlarged right thyroid lobe with nodularity. Thyroid sonography may b of benefit as clinically indicated.  7. CT of C/A/P on 2/11/19--2.5 cm x 1.5 cm pleural based paraspinous mass in the left posterior mediastinum at the T4 level which may be contiguous with the adjacent nerve root. Differential considerations include peripheral nerve sheath tumor and pleural-based metastasis. This may be further evaluated with PET scan if clinically indicated. 10 mm groundglass opacity in the left upper lobe. 3 mm groundglass opacity in the lateral segment of the right middle lobe. The appearance is not specific for early pulmonary metastasis but it cannot be excluded. Subcarinal cystlike structure which measures 15 mm in short axis and is favored to represent a pericardial reflection or trace pericardial fluid. Stable low-attenuation structures in the liver. Mild splenomegaly. 3 cm x 2 cm Bosniak type III cyst present in the lower pole the right kidney. The appearance confers a small but significant risk of malignancy and may be further evaluated with continued CT surveillance or ultrasonography if clinically indicated. Low-attenuation lesions in the thoracic spine demonstrate the characteristic CT appearance of hemangiomas. Stable sclerotic foci in the right sacral alar and the left pubic bone are nonspecific but could represent metastatic disease. Bone scan may be used for further evaluation if concern persists.  8. CT C/A/P 7/2/19--stable paraspinous mass, unchanged pulmonary nodules, favor benign etiology, no changed in right renal lower pole cystic lesion,  suggesting benign entity, diffuse osseous demineralization and regional degenerative changes, no acute or aggressive skeletal abnormality.  9. Screening MMG 6/27/22--focal asymmetries in both breasts need further evaluation.  10. Diagnostic bilateral MMG/US done 6/29/22--Irregular, mixed echogenicity mass with irregular margins in the 11:30 right breast, 8 cm from the nipple, is suspicious for malignancy, measures [id number].  Right breast ultrasound-guided biopsy is recommended. Oval, mixed echogenicity masses with circumscribed margins in the 11:00 right breast, 9 cm from the nipple, 1:00 left breast, 9 cm from the nipple, and 3:00 left breast, 7 cm from the nipple, are favored to represent fat necrosis and as such are considered probably benign.   11. PET/CT 9/8/22--Mild, sub threshold uptake at the right breast nodule, subtle subcutaneous nodular densities at the arm, chest and abdomen/pelvis are nonspecific, subtle associated FDG avidity.  For example at the right upper arm nodule max uptake is 3.  Appearance is nonspecific.  These lesions could be related to areas of fat necrosis.  However in a patient with history of extranodal lymphoma at the right breast, additional foci of lymphomatous disease could conceivably have this appearance, ovoid left thoracic paraspinal lesion compatible with biopsy-proven schwannoma. Per literature review benign schwannoma can have moderate FDG avidity similar to this lesion.    Treatment history:  1. Left breast lumpectomy 4/26/17.  2. Patient declined radiation.  3. Femara 5/15/17--stopped 8/2022.  4. External beam radiation to right breast lymphoma completed 10/18/22.     Treatment plan: Prolia every 6 months started 11/6/17. Due for next 12/2022, plan to stop after this treatment    Chief Complaint: Other Misc (Pt reports no new concerns today.)    HPI  Patient presents for evaluation for new breast lymphoma. Patient completed radiation therapy to breast lesion on. She  tolerated well without any side effects. She has been continued on MTX for RA and Remicade stopped.    History reviewed. No pertinent past medical history.   Review of patient's allergies indicates:   Allergen Reactions    Codeine      Other reaction(s): Agitation, Confusion      Current Outpatient Medications on File Prior to Visit   Medication Sig Dispense Refill    amLODIPine (NORVASC) 2.5 MG tablet Take 2.5 mg by mouth once daily.      cinacalcet (SENSIPAR) 30 MG Tab       ergocalciferol (ERGOCALCIFEROL) 50,000 unit Cap Take 50,000 Units by mouth twice a week.      folic acid (FOLVITE) 1 MG tablet Take 1,000 mcg by mouth once daily.      gabapentin (NEURONTIN) 300 MG capsule Take 300 mg by mouth 3 (three) times daily.      HYDROcodone-acetaminophen (NORCO)  mg per tablet Take 1 tablet by mouth 2 (two) times daily.      methotrexate 2.5 MG Tab TAKE 3 TABLET BY MOUTH EVERY WEEK      TRELEGY ELLIPTA 100-62.5-25 mcg DsDv Take 1 puff by mouth once daily.      letrozole (FEMARA) 2.5 mg Tab TAKE 1 TABLET BY MOUTH DAILY (Patient not taking: Reported on 10/31/2022.) 30 tablet 6     No current facility-administered medications on file prior to visit.      Review of Systems   Constitutional:  Negative for appetite change, fatigue, fever and unexpected weight change.   HENT:  Negative for mouth sores.    Eyes: Negative.    Respiratory:  Negative for cough and shortness of breath.    Cardiovascular:  Negative for chest pain and leg swelling.   Gastrointestinal:  Negative for abdominal distention, abdominal pain, constipation, diarrhea, nausea, vomiting and reflux.   Genitourinary:  Negative for difficulty urinating, dysuria and hematuria.   Musculoskeletal:  Positive for arthralgias. Negative for back pain.        Joint deformities from RA, in a wheelchair   Integumentary:  Negative for rash.        No palpable subcutaneous skin lesions as seen on PET.   Neurological:  Negative for weakness and headaches.    Hematological:  Negative for adenopathy.   Psychiatric/Behavioral:  Negative for sleep disturbance. The patient is not nervous/anxious.        Vitals:    10/31/22 1012   BP: (!) 147/79   Pulse: 61   Resp: 13   Temp: 97.7 °F (36.5 °C)        Physical Exam  Constitutional:       Appearance: Normal appearance.      Comments: Elderly white female   HENT:      Head: Normocephalic.      Nose: Nose normal.      Mouth/Throat:      Mouth: Mucous membranes are moist.   Eyes:      Extraocular Movements: Extraocular movements intact.      Conjunctiva/sclera: Conjunctivae normal.   Cardiovascular:      Rate and Rhythm: Normal rate and regular rhythm.   Pulmonary:      Effort: Pulmonary effort is normal.      Breath sounds: Normal breath sounds.   Chest:      Comments: Right breast with no palpable mass, left breast with lumpectomy incision healed well, no masses. No axillary adenopathy  Abdominal:      General: Bowel sounds are normal. There is no distension.      Palpations: Abdomen is soft.      Tenderness: There is no abdominal tenderness.   Musculoskeletal:      Comments: Joint deformities in hands c/w RA changes, in a wheelchair   Lymphadenopathy:      Comments: No palpable adenopathy   Skin:     General: Skin is warm.   Neurological:      General: No focal deficit present.      Mental Status: She is alert and oriented to person, place, and time.   Psychiatric:         Mood and Affect: Mood normal.         Judgment: Judgment normal.       Lab Visit on 10/31/2022   Component Date Value    WBC 10/31/2022 5.5     RBC 10/31/2022 4.36     Hgb 10/31/2022 13.2     Hct 10/31/2022 40.1     MCV 10/31/2022 92.0     MCH 10/31/2022 30.3     MCHC 10/31/2022 32.9 (L)     RDW 10/31/2022 12.3     Platelet 10/31/2022 175     MPV 10/31/2022 9.2     Neut % 10/31/2022 67.4     Lymph % 10/31/2022 17.9     Mono % 10/31/2022 11.2     Eos % 10/31/2022 2.4     Basophil % 10/31/2022 0.7     Lymph # 10/31/2022 0.99     Neut # 10/31/2022 3.7     Mono  "# 10/31/2022 0.62     Eos # 10/31/2022 0.13     Baso # 10/31/2022 0.04     IG# 10/31/2022 0.02     IG% 10/31/2022 0.4             Assessment:       1. B-cell lymphoma of extranodal or solid organ site    2. History of left breast cancer        Plan:       Patient with breast cancer stage IA s/p lumpectomy done 4/26/17. ER/[place] positive and Her 2 negative.  Tumor was small 0.5cm although may have been larger but no way to tell since there was not a measurement from the biopsy.  Per NCCN guidelines, no chemotherapy recommended due to small size of tumor.  Radiaton offered but patient refused.    On 2/19/19 she had a CT guided left paraspinal mass with results consistent with schwannoma (neurilemomma).   Recommended referral to Neurosurgery for evaluation but she refused since back pain controlled.  Repeat CT C/A/P in 7/2019 showed stable benign pulmonary nodules, no aggressive bone lesion, stable benign parapsinous mass and stable right renal cyst. No need to repeat unless any new symptoms. Outside imaging showed stable paraspinal mass.   Recommended referral to urology for surveillance for renal cyst but she refused this as well and says it has been there for "years".  She quit smoking over 30 years ago so she does not meet criteria for screening CT chest.     She has h/o colon cancer vs. dysplastic polyp in cecum in 2005--colonoscopy was in 2013 Dr. Nelson, 2 polyps removed, both tubular adenomas. Not able to get pathology records from original surgery.  Most recent colonoscopy done 1/2018 showed also polyps c/w tubular adnenomas.     Treatment with AI alone recommended and patient started Femara on 5/15/17.  DEXA from 2017 showed osteoporosis. Patient started on Prolia.  DEXA continued to decline, most recent in 9/2021 was worse.  Recommended for patient to stop Femara in 5/2022, but she did not stop, stopped in 8/2022.   Continue Prolia. Next dose due 12/2022. Plan to stop Prolia after 12/2022 since it will be 5 " years.    Calcium previously stopped due to diagnosis of primary hyperparathyroidism. She is taking high dose Vitamin D as she does not desire any surgery. Closely monitored by Dr. Escalera.     Patient diagnosed with right breast low-grade B-cell lymphoma, 8/3/22, mostly c/w marginal zone lymphoma.     She had no B-symptoms and no palpable adenopathy. LDH elevated, but she also has known RA.    PET/CT 9/8/22 showed nonspecific subcutaneous lesions, right upper arm posterior, left upper back posterior and right posterior back as well as right trochanter hypermetabolism which may be arthritis, her known schwannoma also showed up on the PET.   Of note, there were also multiple other additional lesions noted on MMG/US throughout both breasts that were possible additional sites of lymphoma. But did not show up on PET.    Due to her advanced age, she did not really want any surgery so radiation recommend and she completed on EBRT to right breast lymphoma on 10/18/22.  She tolerated well.   CBCdiff and CMP good, LDH slightly improved.   Will have patient RTC in 1/2023 with repeat PET/CT and labs.  Need to keep a watch on the other lesions on PET which are nonspecific and my index of suspicion is low for lymphoma.   Will also need to repeat MMG/US bilateral in 2/2023.    Discussed with Rheumatology Dr. Selby,and Remicade held for now. Can possibly restart at a later date if it is affecting her QOL. Okay to continue MTX.       All questions answered.          Yenifer John MD      Therapy Plan Information  Medications  denosumab (PROLIA) injection 60 mg  60 mg, Subcutaneous, Every 26 weeks

## 2022-10-31 ENCOUNTER — TELEPHONE (OUTPATIENT)
Dept: HEMATOLOGY/ONCOLOGY | Facility: CLINIC | Age: 87
End: 2022-10-31

## 2022-10-31 ENCOUNTER — OFFICE VISIT (OUTPATIENT)
Dept: HEMATOLOGY/ONCOLOGY | Facility: CLINIC | Age: 87
End: 2022-10-31
Payer: MEDICARE

## 2022-10-31 VITALS
SYSTOLIC BLOOD PRESSURE: 147 MMHG | HEART RATE: 61 BPM | RESPIRATION RATE: 13 BRPM | BODY MASS INDEX: 39.96 KG/M2 | OXYGEN SATURATION: 99 % | WEIGHT: 248.63 LBS | TEMPERATURE: 98 F | HEIGHT: 66 IN | DIASTOLIC BLOOD PRESSURE: 79 MMHG

## 2022-10-31 DIAGNOSIS — Z85.3 HISTORY OF LEFT BREAST CANCER: ICD-10-CM

## 2022-10-31 DIAGNOSIS — C85.19 B-CELL LYMPHOMA OF EXTRANODAL OR SOLID ORGAN SITE: Primary | ICD-10-CM

## 2022-10-31 PROCEDURE — 99999 PR PBB SHADOW E&M-EST. PATIENT-LVL IV: CPT | Mod: PBBFAC,,, | Performed by: INTERNAL MEDICINE

## 2022-10-31 PROCEDURE — 99214 OFFICE O/P EST MOD 30 MIN: CPT | Mod: PBBFAC | Performed by: INTERNAL MEDICINE

## 2022-10-31 PROCEDURE — 99214 OFFICE O/P EST MOD 30 MIN: CPT | Mod: S$PBB,,, | Performed by: INTERNAL MEDICINE

## 2022-10-31 PROCEDURE — 99999 PR PBB SHADOW E&M-EST. PATIENT-LVL IV: ICD-10-PCS | Mod: PBBFAC,,, | Performed by: INTERNAL MEDICINE

## 2022-10-31 PROCEDURE — 99214 PR OFFICE/OUTPT VISIT, EST, LEVL IV, 30-39 MIN: ICD-10-PCS | Mod: S$PBB,,, | Performed by: INTERNAL MEDICINE

## 2022-11-09 ENCOUNTER — HOSPITAL ENCOUNTER (EMERGENCY)
Facility: HOSPITAL | Age: 87
Discharge: HOME | End: 2022-11-09
Attending: FAMILY MEDICINE
Payer: MEDICARE

## 2022-11-09 VITALS
RESPIRATION RATE: 18 BRPM | OXYGEN SATURATION: 98 % | SYSTOLIC BLOOD PRESSURE: 103 MMHG | BODY MASS INDEX: 39.05 KG/M2 | HEIGHT: 66 IN | WEIGHT: 243 LBS | TEMPERATURE: 98 F | HEART RATE: 80 BPM | DIASTOLIC BLOOD PRESSURE: 79 MMHG

## 2022-11-09 DIAGNOSIS — L03.116 CELLULITIS OF LEFT LOWER EXTREMITY: ICD-10-CM

## 2022-11-09 DIAGNOSIS — R60.9 SWELLING: Primary | ICD-10-CM

## 2022-11-09 LAB
ALBUMIN SERPL-MCNC: 3.4 GM/DL (ref 3.4–4.8)
ALBUMIN/GLOB SERPL: 0.8 RATIO (ref 1.1–2)
ALP SERPL-CCNC: 105 UNIT/L (ref 40–150)
ALT SERPL-CCNC: 7 UNIT/L (ref 0–55)
APTT PPP: 38.1 SECONDS (ref 23.4–33.9)
AST SERPL-CCNC: 18 UNIT/L (ref 5–34)
BASOPHILS # BLD AUTO: 0.05 X10(3)/MCL (ref 0–0.2)
BASOPHILS NFR BLD AUTO: 0.8 %
BILIRUBIN DIRECT+TOT PNL SERPL-MCNC: 2 MG/DL
BNP BLD-MCNC: 110.5 PG/ML
BUN SERPL-MCNC: 11.2 MG/DL (ref 9.8–20.1)
CALCIUM SERPL-MCNC: 10.5 MG/DL (ref 8.4–10.2)
CHLORIDE SERPL-SCNC: 103 MMOL/L (ref 98–111)
CO2 SERPL-SCNC: 29 MMOL/L (ref 23–31)
CREAT SERPL-MCNC: 0.87 MG/DL (ref 0.55–1.02)
CRP SERPL HS-MCNC: 55 MG/L
EOSINOPHIL # BLD AUTO: 0.19 X10(3)/MCL (ref 0–0.9)
EOSINOPHIL NFR BLD AUTO: 3 %
ERYTHROCYTE [DISTWIDTH] IN BLOOD BY AUTOMATED COUNT: 12.4 % (ref 11.5–17)
ERYTHROCYTE [SEDIMENTATION RATE] IN BLOOD: 44 MM/HR (ref 0–20)
GFR SERPLBLD CREATININE-BSD FMLA CKD-EPI: >60 MLS/MIN/1.73/M2
GLOBULIN SER-MCNC: 4.5 GM/DL (ref 2.4–3.5)
GLUCOSE SERPL-MCNC: 95 MG/DL (ref 75–121)
HCT VFR BLD AUTO: 39.8 % (ref 37–47)
HGB BLD-MCNC: 13.2 GM/DL (ref 12–16)
IMM GRANULOCYTES # BLD AUTO: 0.03 X10(3)/MCL (ref 0–0.04)
IMM GRANULOCYTES NFR BLD AUTO: 0.5 %
INR BLD: 1.19 (ref 2–3)
LYMPHOCYTES # BLD AUTO: 1.16 X10(3)/MCL (ref 0.6–4.6)
LYMPHOCYTES NFR BLD AUTO: 18.1 %
MCH RBC QN AUTO: 30.2 PG (ref 27–31)
MCHC RBC AUTO-ENTMCNC: 33.2 MG/DL (ref 33–36)
MCV RBC AUTO: 91.1 FL (ref 80–94)
MONOCYTES # BLD AUTO: 0.54 X10(3)/MCL (ref 0.1–1.3)
MONOCYTES NFR BLD AUTO: 8.4 %
NEUTROPHILS # BLD AUTO: 4.4 X10(3)/MCL (ref 2.1–9.2)
NEUTROPHILS NFR BLD AUTO: 69.2 %
NRBC BLD AUTO-RTO: 0 %
PLATELET # BLD AUTO: 213 X10(3)/MCL (ref 130–400)
PMV BLD AUTO: 9.6 FL (ref 7.4–10.4)
POTASSIUM SERPL-SCNC: 3.7 MMOL/L (ref 3.5–5.1)
PROT SERPL-MCNC: 7.9 GM/DL (ref 5.8–7.6)
PROTHROMBIN TIME: 14.8 SECONDS (ref 11.7–14.5)
RBC # BLD AUTO: 4.37 X10(6)/MCL (ref 4.2–5.4)
SODIUM SERPL-SCNC: 144 MMOL/L (ref 132–146)
URATE SERPL-MCNC: 6.6 MG/DL (ref 2.6–6)
WBC # SPEC AUTO: 6.4 X10(3)/MCL (ref 4.5–11.5)

## 2022-11-09 PROCEDURE — 85610 PROTHROMBIN TIME: CPT | Performed by: FAMILY MEDICINE

## 2022-11-09 PROCEDURE — 99284 EMERGENCY DEPT VISIT MOD MDM: CPT | Mod: 25

## 2022-11-09 PROCEDURE — 80053 COMPREHEN METABOLIC PANEL: CPT | Performed by: FAMILY MEDICINE

## 2022-11-09 PROCEDURE — 85730 THROMBOPLASTIN TIME PARTIAL: CPT | Performed by: FAMILY MEDICINE

## 2022-11-09 PROCEDURE — 85651 RBC SED RATE NONAUTOMATED: CPT | Performed by: FAMILY MEDICINE

## 2022-11-09 PROCEDURE — 84550 ASSAY OF BLOOD/URIC ACID: CPT | Performed by: FAMILY MEDICINE

## 2022-11-09 PROCEDURE — 83880 ASSAY OF NATRIURETIC PEPTIDE: CPT | Performed by: FAMILY MEDICINE

## 2022-11-09 PROCEDURE — 86141 C-REACTIVE PROTEIN HS: CPT | Performed by: FAMILY MEDICINE

## 2022-11-09 PROCEDURE — 25000003 PHARM REV CODE 250: Performed by: FAMILY MEDICINE

## 2022-11-09 PROCEDURE — 85025 COMPLETE CBC W/AUTO DIFF WBC: CPT | Performed by: FAMILY MEDICINE

## 2022-11-09 RX ORDER — HYDROCODONE BITARTRATE AND ACETAMINOPHEN 5; 325 MG/1; MG/1
1 TABLET ORAL EVERY 8 HOURS PRN
Qty: 12 TABLET | Refills: 0 | Status: SHIPPED | OUTPATIENT
Start: 2022-11-09 | End: 2022-11-13

## 2022-11-09 RX ORDER — SULFAMETHOXAZOLE AND TRIMETHOPRIM 800; 160 MG/1; MG/1
1 TABLET ORAL
Status: COMPLETED | OUTPATIENT
Start: 2022-11-09 | End: 2022-11-09

## 2022-11-09 RX ORDER — KETOROLAC TROMETHAMINE 10 MG/1
10 TABLET, FILM COATED ORAL
Status: COMPLETED | OUTPATIENT
Start: 2022-11-09 | End: 2022-11-09

## 2022-11-09 RX ORDER — HYDROCODONE BITARTRATE AND ACETAMINOPHEN 7.5; 325 MG/1; MG/1
1 TABLET ORAL
Status: COMPLETED | OUTPATIENT
Start: 2022-11-09 | End: 2022-11-09

## 2022-11-09 RX ORDER — KETOROLAC TROMETHAMINE 10 MG/1
10 TABLET, FILM COATED ORAL EVERY 6 HOURS PRN
Qty: 20 TABLET | Refills: 0 | Status: SHIPPED | OUTPATIENT
Start: 2022-11-09 | End: 2022-11-14

## 2022-11-09 RX ORDER — SULFAMETHOXAZOLE AND TRIMETHOPRIM 800; 160 MG/1; MG/1
1 TABLET ORAL 2 TIMES DAILY
Qty: 20 TABLET | Refills: 0 | Status: SHIPPED | OUTPATIENT
Start: 2022-11-09 | End: 2022-11-19

## 2022-11-09 RX ADMIN — SULFAMETHOXAZOLE AND TRIMETHOPRIM 1 TABLET: 800; 160 TABLET ORAL at 04:11

## 2022-11-09 RX ADMIN — HYDROCODONE BITARTRATE AND ACETAMINOPHEN 1 TABLET: 7.5; 325 TABLET ORAL at 01:11

## 2022-11-09 RX ADMIN — KETOROLAC TROMETHAMINE 10 MG: 10 TABLET, FILM COATED ORAL at 04:11

## 2022-11-10 NOTE — ED PROVIDER NOTES
Encounter Date: 11/9/2022       History     Chief Complaint   Patient presents with    Leg Swelling     Reports left leg swelling x 2 weeks. States yesterday became more painful when standing, increased swelling and redness. Pulse present w/doppler     89-year-old female with history of hypertension presents with son for left lower extremity swelling with erythema for the past 1 week.  Patient states the leg began to swell and then turned red about 4 days ago.  Denies recent trauma.  Denies fever or drainage.  Patient states pain only with ambulation.  No pain at rest.  She is not on diuretics.  Denies history of DVT or PE.  Denies chest pain or shortness of breath.  No other complaints.    The history is provided by the patient and a relative.   Review of patient's allergies indicates:   Allergen Reactions    Codeine      Other reaction(s): Agitation, Confusion     History reviewed. No pertinent past medical history.  Past Surgical History:   Procedure Laterality Date    HYSTERECTOMY      comp hyst at age 38    OOPHORECTOMY      comp hyst at age 38     History reviewed. No pertinent family history.  Social History     Tobacco Use    Smoking status: Former     Types: Cigarettes    Smokeless tobacco: Never     Review of Systems   Constitutional: Negative.    HENT: Negative.     Eyes: Negative.    Respiratory: Negative.     Cardiovascular:  Positive for leg swelling.   Gastrointestinal: Negative.    Endocrine: Negative.    Genitourinary: Negative.    Musculoskeletal: Negative.    Skin: Negative.    Allergic/Immunologic: Negative.    Neurological: Negative.    Hematological: Negative.    Psychiatric/Behavioral: Negative.       Physical Exam     Initial Vitals [11/09/22 1306]   BP Pulse Resp Temp SpO2   (!) 145/80 80 18 98.1 °F (36.7 °C) 98 %      MAP       --         Physical Exam    Nursing note and vitals reviewed.  Constitutional: She appears well-developed and well-nourished.   HENT:   Head: Normocephalic and  atraumatic.   Eyes: Conjunctivae and EOM are normal. Pupils are equal, round, and reactive to light.   Neck: Neck supple.   Normal range of motion.  Cardiovascular:  Normal rate, regular rhythm and intact distal pulses.           Pulmonary/Chest: Breath sounds normal.   Abdominal: Abdomen is soft. Bowel sounds are normal.   Musculoskeletal:         General: Edema present. Normal range of motion.      Cervical back: Normal range of motion and neck supple.      Comments: Bilateral lower extremities-2+ pitting edema.  Left lower extremity-mild erythema noted diffusely over the ankle. Not warm to touch.  No fluctuance or drainage.  Nontender to palpation.  Full range of motion, strength 5/5.  Negative Homans sign.     Neurological: She is alert and oriented to person, place, and time. She has normal strength.   Skin: Skin is warm. Capillary refill takes less than 2 seconds.   Psychiatric: She has a normal mood and affect.       ED Course   Procedures  Labs Reviewed   COMPREHENSIVE METABOLIC PANEL - Abnormal; Notable for the following components:       Result Value    Calcium Level Total 10.5 (*)     Protein Total 7.9 (*)     Globulin 4.5 (*)     Albumin/Globulin Ratio 0.8 (*)     Bilirubin Total 2.0 (*)     All other components within normal limits   APTT - Abnormal; Notable for the following components:    PTT 38.1 (*)     All other components within normal limits   B-TYPE NATRIURETIC PEPTIDE - Abnormal; Notable for the following components:    Natriuretic Peptide 110.5 (*)     All other components within normal limits   PROTIME-INR - Abnormal; Notable for the following components:    PT 14.8 (*)     INR 1.19 (*)     All other components within normal limits   URIC ACID - Abnormal; Notable for the following components:    Uric Acid 6.6 (*)     All other components within normal limits   SEDIMENTATION RATE, AUTOMATED - Abnormal; Notable for the following components:    Sed Rate 44 (*)     All other components within  normal limits   HIGH SENSITIVITY CRP - Abnormal; Notable for the following components:    C-Reactive Protein High Sensitivity 55.00 (*)     All other components within normal limits   CBC W/ AUTO DIFFERENTIAL    Narrative:     The following orders were created for panel order CBC auto differential.  Procedure                               Abnormality         Status                     ---------                               -----------         ------                     CBC with Differential[[id number]]                            Final result                 Please view results for these tests on the individual orders.   CBC WITH DIFFERENTIAL          Imaging Results              X-Ray Ankle Complete Left (Final result)  Result time 11/09/22 15:15:19      Final result by Matt Galo MD (11/09/22 15:15:19)                   Impression:      No acute osseous process appreciated.  Mild increase in lateral soft tissue swelling since February 2021.      Electronically signed by: Matt Galo  Date:    11/09/2022  Time:    15:15               Narrative:    EXAMINATION:  XR ANKLE COMPLETE 3 VIEW LEFT    CLINICAL HISTORY:  Edema, unspecified    TECHNIQUE:  AP, lateral and oblique views of the left ankle    COMPARISON:  Radiography 02/10/2021    FINDINGS:  Relatively generalized soft tissue swelling near the ankle, mildly increased laterally compared to prior study.  A screw again traverses the subtalar joint with degenerative changes in the hindfoot.  No acute fracture or dislocation identified.                                       Medications   HYDROcodone-acetaminophen 7.5-325 mg per tablet 1 tablet (1 tablet Oral Given 11/9/22 1342)   ketorolac tablet 10 mg (10 mg Oral Given 11/9/22 1602)   sulfamethoxazole-trimethoprim 800-160mg per tablet 1 tablet (1 tablet Oral Given 11/9/22 1602)     Medical Decision Making:   Clinical Tests:   Lab Tests: Ordered and Reviewed  Radiological Study: Ordered and Reviewed  ED  Management:  Patient is nontoxic-appearing in no acute distress.  Afebrile.  No leukocytosis.  Mild elevation to her sed rate, CRP, and uric acid.  X-ray shows soft tissue swelling.  Will cover with antibiotics and anti-inflammatories.  Encouraged patient to keep the leg elevated.  Call follow-up with her PCP as soon as possible for further evaluation.  Strict return to ER precautions given, patient/son voiced understanding.           ED Course as of 11/10/22 1731   Wed Nov 09, 2022   1422 We do not have ultrasound capabilities at this time. [AG]      ED Course User Index  [AG] Julio Krause MD                 Clinical Impression:   Final diagnoses:  [R60.9] Swelling (Primary)  [L03.116] Cellulitis of left lower extremity        ED Disposition Condition    Discharge Stable          ED Prescriptions       Medication Sig Dispense Start Date End Date Auth. Provider    ketorolac (TORADOL) 10 mg tablet Take 1 tablet (10 mg total) by mouth every 6 (six) hours as needed for Pain. 20 tablet 11/9/2022 11/14/2022 Julio Krause MD    HYDROcodone-acetaminophen (NORCO) 5-325 mg per tablet Take 1 tablet by mouth every 8 (eight) hours as needed for Pain. 12 tablet 11/9/2022 11/13/2022 Julio Krause MD    sulfamethoxazole-trimethoprim 800-160mg (BACTRIM DS) 800-160 mg Tab Take 1 tablet by mouth 2 (two) times daily. for 10 days 20 tablet 11/9/2022 11/19/2022 Julio Krause MD          Follow-up Information    None          Julio Krause MD  11/10/22 1731

## 2022-11-30 ENCOUNTER — HOSPITAL ENCOUNTER (EMERGENCY)
Facility: HOSPITAL | Age: 87
Discharge: HOME | End: 2022-11-30
Attending: EMERGENCY MEDICINE
Payer: MEDICARE

## 2022-11-30 VITALS
TEMPERATURE: 98 F | DIASTOLIC BLOOD PRESSURE: 82 MMHG | OXYGEN SATURATION: 99 % | BODY MASS INDEX: 40.65 KG/M2 | HEART RATE: 67 BPM | WEIGHT: 244 LBS | RESPIRATION RATE: 17 BRPM | SYSTOLIC BLOOD PRESSURE: 135 MMHG | HEIGHT: 65 IN

## 2022-11-30 DIAGNOSIS — M79.672 FOOT PAIN, LEFT: ICD-10-CM

## 2022-11-30 DIAGNOSIS — M79.606 LEG PAIN: ICD-10-CM

## 2022-11-30 DIAGNOSIS — M25.472 EDEMA OF LEFT ANKLE: Primary | ICD-10-CM

## 2022-11-30 PROCEDURE — 99284 EMERGENCY DEPT VISIT MOD MDM: CPT | Mod: 25

## 2022-11-30 NOTE — ED TRIAGE NOTES
Pt family concerned at swelling to left foot, that has improved with no redness to foot and less swelling from a previous visit with rx Bactrim DS and torodol 10 mg given at [facility]

## 2022-11-30 NOTE — DISCHARGE INSTRUCTIONS
Elevate your leg when your ankle is swollen.  OK to ice 15 to 20 minutes with an icepack that is not going to get too cold. Use caution that you do not freeze your skin.  Take your home pain medication as prescribed.  Consider getting a compression stocking to help with the swelling.  Follow up with your doctor.

## 2022-12-02 NOTE — ED PROVIDER NOTES
Encounter Date: 11/30/2022       History     Chief Complaint   Patient presents with    Recheck     Pt family concerned at swelling to left foot, that has improved with no redness to foot and less swelling from a previous visit with rx Bactrim DS and torodol 10 mg given at [facility]     89-year-old female presents to the emergency room with swelling to her left foot.  It was very swollen 2 weeks ago and she was prescribed Bactrim because it was red at that time.  The redness has resolved but it is still swollen.  She has a history of a fracture to her calcaneus and has a screw in her heel.  She has no new injuries.  She has no fever.      Review of patient's allergies indicates:   Allergen Reactions    Codeine      Other reaction(s): Agitation, Confusion     No past medical history on file.  Past Surgical History:   Procedure Laterality Date    HYSTERECTOMY      comp hyst at age 38    OOPHORECTOMY      comp hyst at age 38     No family history on file.  Social History     Tobacco Use    Smoking status: Former     Types: Cigarettes    Smokeless tobacco: Never     Review of Systems   Musculoskeletal:         Left foot swelling and pain   All other systems reviewed and are negative.    Physical Exam     Initial Vitals [11/30/22 0955]   BP Pulse Resp Temp SpO2   (!) 144/85 66 20 98.6 °F (37 °C) 98 %      MAP       --         Physical Exam    Nursing note and vitals reviewed.  Constitutional: She appears well-developed and well-nourished. She is not diaphoretic. No distress.   HENT:   Head: Normocephalic and atraumatic.   Mouth/Throat: Oropharynx is clear and moist.   Eyes: Conjunctivae are normal. Pupils are equal, round, and reactive to light.   Neck: Neck supple.   Cardiovascular:  Normal rate, regular rhythm, normal heart sounds and intact distal pulses.           Pulmonary/Chest: Breath sounds normal. No respiratory distress. She has no wheezes. She has no rhonchi. She has no rales.   Abdominal: Abdomen is soft.  Bowel sounds are normal. She exhibits no distension. There is no abdominal tenderness. There is no guarding.   Musculoskeletal:         General: No tenderness or edema. Normal range of motion.      Cervical back: Neck supple.      Comments: Edema noted to the left calf left foot with mild diffuse tenderness.  There is no erythema.  Capillary refill is brisk.  Pedal pulses 2+.  Sensation and motor is intact     Neurological: She is alert and oriented to person, place, and time.   Skin: Skin is warm and dry. Capillary refill takes less than 2 seconds. No rash noted.   Psychiatric: She has a normal mood and affect. Thought content normal.       ED Course   Procedures  Labs Reviewed - No data to display       Imaging Results              X-Ray Tibia Fibula 2 View Left (Final result)  Result time 11/30/22 12:15:23      Final result by Nadira Nam MD (11/30/22 12:15:23)                   Impression:      No acute bony abnormality.      Electronically signed by: Nadira Nam  Date:    11/30/2022  Time:    12:15               Narrative:    EXAMINATION:  XR TIBIA FIBULA 2 VIEW LEFT    CLINICAL HISTORY:  Pain in leg, unspecified    COMPARISON:  None.    FINDINGS:  There is no acute fracture or malalignment.  There is mild soft tissue swelling.                                       X-Ray Foot Complete Left (Final result)  Result time 11/30/22 12:17:10      Final result by Nadira Nam MD (11/30/22 12:17:10)                   Impression:      No acute abnormality identified.      Electronically signed by: Nadira Nam  Date:    11/30/2022  Time:    12:17               Narrative:    EXAMINATION:  XR FOOT COMPLETE 3 VIEW LEFT    CLINICAL HISTORY:  Pain in left foot    COMPARISON:  None.    FINDINGS:  There is no definite acute fracture or malalignment.  There has has been prior internal fixation of the calcaneus.  There are degenerative changes of the foot with dorsal osteophytes in the midfoot.  The soft  tissues are unremarkable.                                       US Lower Extremity Veins Left (Final result)  Result time 11/30/22 11:52:41      Final result by Karoline Santiago MD (11/30/22 11:52:41)                   Impression:      No evidence of deep venous thrombosis in the left lower extremity.      Electronically signed by: Karoline Santiago  Date:    11/30/2022  Time:    11:52               Narrative:    EXAMINATION:  US LOWER EXTREMITY VEINS LEFT    CLINICAL HISTORY:  Pain in leg, unspecified    TECHNIQUE:  Duplex and color flow Doppler evaluation and graded compression of the left lower extremity veins was performed.    COMPARISON:  None    FINDINGS:  Left thigh veins: The common femoral, femoral, popliteal, upper greater saphenous, and deep femoral veins are patent and free of thrombus. The veins are normally compressible and have normal phasic flow and augmentation response.    Left calf veins: The visualized calf veins are patent.    Contralateral CFV: Not imaged    Miscellaneous: None                                       Medications - No data to display  Medical Decision Making:   ED Management:  Tib-fib x-ray and foot and ankle x-rays are negative.  Ultrasound is negative for DVT.  She has no arterial insufficiency as she has an easily palpable pedal pulse with brisk capillary refill to the toes.  There is no sign of infection, just swelling to the left foot with a history of surgery on that side.  I am recommending compression hose elevate her legs.  Tylenol is the safest thing for her to take for pain.                        Clinical Impression:   Final diagnoses:  [M79.672] Foot pain, left  [M79.606] Leg pain  [M25.472] Edema of left ankle (Primary)        ED Disposition Condition    Discharge Stable          ED Prescriptions    None       Follow-up Information       Follow up With Specialties Details Why Contact Info    PCP  Schedule an appointment as soon as possible for a visit                 Marimar Grijalva MD  12/02/22 5411

## 2022-12-06 ENCOUNTER — INFUSION (OUTPATIENT)
Dept: INFUSION THERAPY | Facility: HOSPITAL | Age: 87
End: 2022-12-06
Attending: INTERNAL MEDICINE
Payer: MEDICARE

## 2022-12-06 VITALS
HEART RATE: 85 BPM | SYSTOLIC BLOOD PRESSURE: 132 MMHG | DIASTOLIC BLOOD PRESSURE: 75 MMHG | RESPIRATION RATE: 18 BRPM | TEMPERATURE: 98 F

## 2022-12-06 DIAGNOSIS — M81.0 AGE-RELATED OSTEOPOROSIS WITHOUT CURRENT PATHOLOGICAL FRACTURE: Primary | ICD-10-CM

## 2022-12-06 PROCEDURE — 96372 THER/PROPH/DIAG INJ SC/IM: CPT

## 2022-12-06 PROCEDURE — 63600175 PHARM REV CODE 636 W HCPCS: Mod: JG | Performed by: NURSE PRACTITIONER

## 2022-12-06 RX ADMIN — DENOSUMAB 60 MG: 60 INJECTION SUBCUTANEOUS at 10:12

## 2023-01-12 ENCOUNTER — HOSPITAL ENCOUNTER (EMERGENCY)
Facility: HOSPITAL | Age: 88
Discharge: HOME | End: 2023-01-12
Attending: EMERGENCY MEDICINE
Payer: MEDICARE

## 2023-01-12 VITALS
SYSTOLIC BLOOD PRESSURE: 143 MMHG | HEART RATE: 73 BPM | OXYGEN SATURATION: 95 % | DIASTOLIC BLOOD PRESSURE: 72 MMHG | TEMPERATURE: 98 F | HEIGHT: 64 IN | RESPIRATION RATE: 18 BRPM | BODY MASS INDEX: 40.97 KG/M2 | WEIGHT: 240 LBS

## 2023-01-12 DIAGNOSIS — M54.2 NECK PAIN, BILATERAL POSTERIOR: ICD-10-CM

## 2023-01-12 DIAGNOSIS — R42 VERTIGO: Primary | ICD-10-CM

## 2023-01-12 DIAGNOSIS — R42 DIZZINESS: ICD-10-CM

## 2023-01-12 LAB
ALBUMIN SERPL-MCNC: 3.3 G/DL (ref 3.4–4.8)
ALBUMIN/GLOB SERPL: 0.9 RATIO (ref 1.1–2)
ALP SERPL-CCNC: 104 UNIT/L (ref 40–150)
ALT SERPL-CCNC: 8 UNIT/L (ref 0–55)
AST SERPL-CCNC: 19 UNIT/L (ref 5–34)
BASOPHILS # BLD AUTO: 0.04 X10(3)/MCL (ref 0–0.2)
BASOPHILS NFR BLD AUTO: 0.8 %
BILIRUBIN DIRECT+TOT PNL SERPL-MCNC: 1.9 MG/DL
BUN SERPL-MCNC: 8 MG/DL (ref 9.8–20.1)
CALCIUM SERPL-MCNC: 7.6 MG/DL (ref 8.4–10.2)
CHLORIDE SERPL-SCNC: 106 MMOL/L (ref 98–111)
CO2 SERPL-SCNC: 29 MMOL/L (ref 23–31)
CREAT SERPL-MCNC: 0.78 MG/DL (ref 0.55–1.02)
EOSINOPHIL # BLD AUTO: 0.07 X10(3)/MCL (ref 0–0.9)
EOSINOPHIL NFR BLD AUTO: 1.4 %
ERYTHROCYTE [DISTWIDTH] IN BLOOD BY AUTOMATED COUNT: 14.1 % (ref 11.5–17)
FLUAV AG UPPER RESP QL IA.RAPID: NOT DETECTED
FLUBV AG UPPER RESP QL IA.RAPID: NOT DETECTED
GFR SERPLBLD CREATININE-BSD FMLA CKD-EPI: >60 MLS/MIN/1.73/M2
GLOBULIN SER-MCNC: 3.8 GM/DL (ref 2.4–3.5)
GLUCOSE SERPL-MCNC: 107 MG/DL (ref 75–121)
HCT VFR BLD AUTO: 36.9 % (ref 37–47)
HGB BLD-MCNC: 12 GM/DL (ref 12–16)
IMM GRANULOCYTES # BLD AUTO: 0.02 X10(3)/MCL (ref 0–0.04)
IMM GRANULOCYTES NFR BLD AUTO: 0.4 %
LYMPHOCYTES # BLD AUTO: 0.61 X10(3)/MCL (ref 0.6–4.6)
LYMPHOCYTES NFR BLD AUTO: 12.2 %
MAGNESIUM SERPL-MCNC: 1.7 MG/DL (ref 1.6–2.6)
MCH RBC QN AUTO: 29.4 PG
MCHC RBC AUTO-ENTMCNC: 32.5 MG/DL (ref 33–36)
MCV RBC AUTO: 90.4 FL (ref 80–94)
MONOCYTES # BLD AUTO: 0.47 X10(3)/MCL (ref 0.1–1.3)
MONOCYTES NFR BLD AUTO: 9.4 %
NEUTROPHILS # BLD AUTO: 3.79 X10(3)/MCL (ref 2.1–9.2)
NEUTROPHILS NFR BLD AUTO: 75.8 %
NRBC BLD AUTO-RTO: 0 %
PLATELET # BLD AUTO: 251 X10(3)/MCL (ref 130–400)
PMV BLD AUTO: 9.5 FL (ref 7.4–10.4)
POTASSIUM SERPL-SCNC: 3.4 MMOL/L (ref 3.5–5.1)
PROT SERPL-MCNC: 7.1 GM/DL (ref 5.8–7.6)
RBC # BLD AUTO: 4.08 X10(6)/MCL (ref 4.2–5.4)
SARS-COV-2 RNA RESP QL NAA+PROBE: NOT DETECTED
SODIUM SERPL-SCNC: 143 MMOL/L (ref 132–146)
TROPONIN I SERPL-MCNC: 0.02 NG/ML (ref 0–0.04)
WBC # SPEC AUTO: 5 X10(3)/MCL (ref 4.5–11.5)

## 2023-01-12 PROCEDURE — 93010 ELECTROCARDIOGRAM REPORT: CPT | Mod: ,,, | Performed by: STUDENT IN AN ORGANIZED HEALTH CARE EDUCATION/TRAINING PROGRAM

## 2023-01-12 PROCEDURE — 99285 EMERGENCY DEPT VISIT HI MDM: CPT | Mod: 25,CS

## 2023-01-12 PROCEDURE — 93010 EKG 12-LEAD: ICD-10-PCS | Mod: ,,, | Performed by: STUDENT IN AN ORGANIZED HEALTH CARE EDUCATION/TRAINING PROGRAM

## 2023-01-12 PROCEDURE — 84484 ASSAY OF TROPONIN QUANT: CPT | Performed by: EMERGENCY MEDICINE

## 2023-01-12 PROCEDURE — 85025 COMPLETE CBC W/AUTO DIFF WBC: CPT | Performed by: EMERGENCY MEDICINE

## 2023-01-12 PROCEDURE — 83735 ASSAY OF MAGNESIUM: CPT | Performed by: EMERGENCY MEDICINE

## 2023-01-12 PROCEDURE — 25000003 PHARM REV CODE 250: Performed by: EMERGENCY MEDICINE

## 2023-01-12 PROCEDURE — 0240U COVID/FLU A&B PCR: CPT | Performed by: EMERGENCY MEDICINE

## 2023-01-12 PROCEDURE — 80053 COMPREHEN METABOLIC PANEL: CPT | Performed by: EMERGENCY MEDICINE

## 2023-01-12 PROCEDURE — 93005 ELECTROCARDIOGRAM TRACING: CPT

## 2023-01-12 RX ORDER — MECLIZINE HYDROCHLORIDE 25 MG/1
25 TABLET ORAL
Status: COMPLETED | OUTPATIENT
Start: 2023-01-12 | End: 2023-01-12

## 2023-01-12 RX ORDER — TRAMADOL HYDROCHLORIDE 50 MG/1
50 TABLET ORAL EVERY 6 HOURS PRN
Qty: 20 TABLET | Refills: 0 | Status: SHIPPED | OUTPATIENT
Start: 2023-01-12 | End: 2023-01-17

## 2023-01-12 RX ORDER — MECLIZINE HYDROCHLORIDE 25 MG/1
25 TABLET ORAL 3 TIMES DAILY PRN
Qty: 30 TABLET | Refills: 1 | Status: SHIPPED | OUTPATIENT
Start: 2023-01-12 | End: 2023-01-22

## 2023-01-12 RX ORDER — PREDNISONE 20 MG/1
40 TABLET ORAL DAILY
Qty: 10 TABLET | Refills: 0 | Status: SHIPPED | OUTPATIENT
Start: 2023-01-12 | End: 2023-01-17

## 2023-01-12 RX ORDER — TRAMADOL HYDROCHLORIDE 50 MG/1
50 TABLET ORAL
Status: COMPLETED | OUTPATIENT
Start: 2023-01-12 | End: 2023-01-12

## 2023-01-12 RX ADMIN — TRAMADOL HYDROCHLORIDE 50 MG: 50 TABLET ORAL at 08:01

## 2023-01-12 RX ADMIN — MECLIZINE HYDROCHLORIDE 25 MG: 25 TABLET ORAL at 08:01

## 2023-01-12 NOTE — ED PROVIDER NOTES
Encounter Date: 1/12/2023       History     Chief Complaint   Patient presents with    Dizziness     Pt c/o dizziness w/bilateral ear pain and unsteady gait x 1 week, denies vertigo hx.      The history is provided by the patient. No  was used.   Dizziness  This is a new problem. The current episode started more than 2 days ago. The problem has not changed since onset.Associated symptoms include headaches. Pertinent negatives include no chest pain and no shortness of breath. The symptoms are aggravated by bending. Nothing relieves the symptoms.   Also c/o bilateral ear pain, neck pain.  Denies N/V.  No CP, SOB or palpitations.    Review of patient's allergies indicates:   Allergen Reactions    Codeine      Other reaction(s): Agitation, Confusion     No past medical history on file.  Past Surgical History:   Procedure Laterality Date    HYSTERECTOMY      comp hyst at age 38    OOPHORECTOMY      comp hyst at age 38     No family history on file.  Social History     Tobacco Use    Smoking status: Former     Types: Cigarettes    Smokeless tobacco: Never     Review of Systems   Constitutional:  Negative for fever.   HENT:  Negative for sore throat.    Respiratory:  Negative for shortness of breath.    Cardiovascular:  Negative for chest pain.   Gastrointestinal:  Negative for nausea.   Genitourinary:  Negative for dysuria.   Musculoskeletal:  Negative for back pain.   Skin:  Negative for rash.   Neurological:  Positive for dizziness and headaches. Negative for weakness.   Hematological:  Does not bruise/bleed easily.     Physical Exam     Initial Vitals [01/12/23 0814]   BP Pulse Resp Temp SpO2   (!) 175/84 73 (!) 21 98.4 °F (36.9 °C) 97 %      MAP       --         Physical Exam    Nursing note and vitals reviewed.  Constitutional: She appears well-developed and well-nourished.   HENT:   Head: Normocephalic and atraumatic.   Right Ear: External ear normal.   Left Ear: External ear normal.   Eyes:  Conjunctivae and EOM are normal. Pupils are equal, round, and reactive to light.   Neck: Neck supple.   Normal range of motion.  Cardiovascular:  Normal rate, regular rhythm, normal heart sounds and intact distal pulses.           Pulmonary/Chest: Breath sounds normal.   Abdominal: Abdomen is soft. Bowel sounds are normal.   Musculoskeletal:         General: Normal range of motion.      Cervical back: Normal range of motion and neck supple.     Neurological: She is alert and oriented to person, place, and time. GCS score is 15. GCS eye subscore is 4. GCS verbal subscore is 5. GCS motor subscore is 6.   Skin: Skin is warm and dry. Capillary refill takes less than 2 seconds.   Psychiatric: She has a normal mood and affect. Her behavior is normal. Judgment and thought content normal.       ED Course   Procedures  Labs Reviewed   COMPREHENSIVE METABOLIC PANEL - Abnormal; Notable for the following components:       Result Value    Potassium Level 3.4 (*)     Blood Urea Nitrogen 8.0 (*)     Calcium Level Total 7.6 (*)     Albumin Level 3.3 (*)     Globulin 3.8 (*)     Albumin/Globulin Ratio 0.9 (*)     Bilirubin Total 1.9 (*)     All other components within normal limits   CBC WITH DIFFERENTIAL - Abnormal; Notable for the following components:    RBC 4.08 (*)     Hct 36.9 (*)     MCHC 32.5 (*)     All other components within normal limits   COVID/FLU A&B PCR - Normal    Narrative:     The Xpert Xpress SARS-CoV-2/FLU/RSV plus is a rapid, multiplexed real-time PCR test intended for the simultaneous qualitative detection and differentiation of SARS-CoV-2, Influenza A, Influenza B, and respiratory syncytial virus (RSV) viral RNA in either nasopharyngeal swab or nasal swab specimens.         MAGNESIUM - Normal   TROPONIN I - Normal   CBC W/ AUTO DIFFERENTIAL    Narrative:     The following orders were created for panel order CBC auto differential.  Procedure                               Abnormality         Status                      ---------                               -----------         ------                     CBC with Differential[[id number]]        Abnormal            Final result                 Please view results for these tests on the individual orders.     EKG Readings: (Independently Interpreted)   Initial Reading: No STEMI. Rhythm: Normal Sinus Rhythm. Heart Rate: 68. Ectopy: No Ectopy. Conduction: Normal. ST Segments: Normal ST Segments. T Waves: Normal. Axis: Normal. Clinical Impression: Normal Sinus Rhythm   ECG Results              EKG 12-lead (In process)  Result time 01/12/23 09:29:48      In process by Interface, [facility] (01/12/23 09:29:48)                   Narrative:    Test Reason : R42,    Vent. Rate : 068 BPM     Atrial Rate : 068 BPM     P-R Int : 154 ms          QRS Dur : 102 ms      QT Int : 448 ms       P-R-T Axes : 030 001 050 degrees     QTc Int : 476 ms    Normal sinus rhythm  Nonspecific ST abnormality  Abnormal ECG  No previous ECGs available    Referred By: [email or web address]   SELF           Confirmed By:                                   Imaging Results              CT Head Without Contrast (Final result)  Result time 01/12/23 09:17:34      Final result by Nadira Nam MD (01/12/23 09:17:34)                   Impression:      1. No acute intracranial abnormality.  2. Chronic microvascular ischemic changes.      Electronically signed by: Nadira Nam  Date:    01/12/2023  Time:    09:17               Narrative:    EXAMINATION:  CT HEAD WITHOUT CONTRAST    CLINICAL HISTORY:  Dizziness, persistent/recurrent, cardiac or vascular cause suspected;    TECHNIQUE:  Axial scans were obtained from skull base to the vertex.    Coronal and sagittal reconstructions obtained from the axial data.    Automatic exposure control was utilized to limit radiation dose.    Contrast: None    Radiation Dose:    Total DLP: 1047 mGy*cm    COMPARISON:  None    FINDINGS:  There is no acute intracranial hemorrhage or  edema. The gray-white matter differentiation is preserved.  Scattered hypodensities in the subcortical and periventricular white matter likely represent chronic microvascular ischemic changes.    There is no mass effect or midline shift.  There is diffuse parenchymal volume loss.  The basal cisterns are patent. There is no abnormal extra-axial fluid collection.    The calvarium and skull base are intact. The visualized paranasal sinuses and the mastoid air cells are clear.                                       Medications   meclizine tablet 25 mg (25 mg Oral Given 1/12/23 0845)   traMADoL tablet 50 mg (50 mg Oral Given 1/12/23 0845)                  Differential includes: vertigo, Meuniere's disease, migraine, tension headache, CVA, spondylitis, electrolyte disturbance, MS, myocardial ischemia, cerumen impaction, RA flare    Given age, will obtain head CT to r/o CVA and check CBC, CMP, troponin, EKG to r/o cardiac issues.  Trial of meclizine for what sounds like vertigo and tramadol for neck pain/HA.     CT negative for CVA.  Labs unremarkable as is EKG.  Dizziness improved with meclizine.  Still having neck pain.  Son notes that she stopped doing her infusions for her rheumatoid arthritis several months ago.  Neck pain could be due to RA.  Will D/C with prednisone and tramadol for pain and meclizine for dizziness.       Clinical Impression:   Final diagnoses:  [R42] Dizziness  [R42] Vertigo (Primary)  [M54.2] Neck pain, bilateral posterior        ED Disposition Condition    Discharge Stable          ED Prescriptions       Medication Sig Dispense Start Date End Date Auth. Provider    meclizine (ANTIVERT) 25 mg tablet Take 1 tablet (25 mg total) by mouth 3 (three) times daily as needed for Dizziness. 30 tablet 1/12/2023 1/22/2023 Luis Alberto Velasquez MD    predniSONE (DELTASONE) 20 MG tablet Take 2 tablets (40 mg total) by mouth once daily. for 5 days 10 tablet 1/12/2023 1/17/2023 Luis Alberto Velasquez MD     traMADoL (ULTRAM) 50 mg tablet Take 1 tablet (50 mg total) by mouth every 6 (six) hours as needed for Pain. 20 tablet 1/12/2023 1/17/2023 Luis Alberto Velasquez MD          Follow-up Information       Follow up With Specialties Details Why Contact Info    Follow up with your primary care provider in 2 weeks if not improved                 Luis Alberto Velasquez MD  01/12/23 1037

## 2023-01-23 ENCOUNTER — LAB VISIT (OUTPATIENT)
Dept: LAB | Facility: HOSPITAL | Age: 88
End: 2023-01-23
Attending: INTERNAL MEDICINE
Payer: MEDICARE

## 2023-01-23 DIAGNOSIS — C85.19 B-CELL LYMPHOMA OF EXTRANODAL OR SOLID ORGAN SITE: ICD-10-CM

## 2023-01-23 DIAGNOSIS — Z85.3 HISTORY OF LEFT BREAST CANCER: ICD-10-CM

## 2023-01-23 LAB
ALBUMIN SERPL-MCNC: 3.1 G/DL (ref 3.4–4.8)
ALBUMIN/GLOB SERPL: 0.9 RATIO (ref 1.1–2)
ALP SERPL-CCNC: 99 UNIT/L (ref 40–150)
ALT SERPL-CCNC: 8 UNIT/L (ref 0–55)
AST SERPL-CCNC: 14 UNIT/L (ref 5–34)
BASOPHILS # BLD AUTO: 0.03 X10(3)/MCL (ref 0–0.2)
BASOPHILS NFR BLD AUTO: 0.4 %
BILIRUBIN DIRECT+TOT PNL SERPL-MCNC: 1.3 MG/DL
BUN SERPL-MCNC: 10.6 MG/DL (ref 9.8–20.1)
CALCIUM SERPL-MCNC: 8.2 MG/DL (ref 8.4–10.2)
CHLORIDE SERPL-SCNC: 105 MMOL/L (ref 98–111)
CO2 SERPL-SCNC: 29 MMOL/L (ref 23–31)
CREAT SERPL-MCNC: 1.01 MG/DL (ref 0.55–1.02)
EOSINOPHIL # BLD AUTO: 0.18 X10(3)/MCL (ref 0–0.9)
EOSINOPHIL NFR BLD AUTO: 2.7 %
ERYTHROCYTE [DISTWIDTH] IN BLOOD BY AUTOMATED COUNT: 14.2 % (ref 11.5–17)
GFR SERPLBLD CREATININE-BSD FMLA CKD-EPI: 53 MLS/MIN/1.73/M2
GLOBULIN SER-MCNC: 3.5 GM/DL (ref 2.4–3.5)
GLUCOSE SERPL-MCNC: 115 MG/DL (ref 75–121)
HCT VFR BLD AUTO: 38.5 % (ref 37–47)
HGB BLD-MCNC: 12 GM/DL (ref 12–16)
IMM GRANULOCYTES # BLD AUTO: 0.03 X10(3)/MCL (ref 0–0.04)
IMM GRANULOCYTES NFR BLD AUTO: 0.4 %
LDH SERPL-CCNC: 214 U/L (ref 125–220)
LYMPHOCYTES # BLD AUTO: 1.05 X10(3)/MCL (ref 0.6–4.6)
LYMPHOCYTES NFR BLD AUTO: 15.5 %
MCH RBC QN AUTO: 29.1 PG
MCHC RBC AUTO-ENTMCNC: 31.2 MG/DL (ref 33–36)
MCV RBC AUTO: 93.4 FL (ref 80–94)
MONOCYTES # BLD AUTO: 0.87 X10(3)/MCL (ref 0.1–1.3)
MONOCYTES NFR BLD AUTO: 12.9 %
NEUTROPHILS # BLD AUTO: 4.61 X10(3)/MCL (ref 2.1–9.2)
NEUTROPHILS NFR BLD AUTO: 68.1 %
PLATELET # BLD AUTO: 173 X10(3)/MCL (ref 130–400)
PMV BLD AUTO: 9.8 FL (ref 7.4–10.4)
POTASSIUM SERPL-SCNC: 3.9 MMOL/L (ref 3.5–5.1)
PROT SERPL-MCNC: 6.6 GM/DL (ref 5.8–7.6)
RBC # BLD AUTO: 4.12 X10(6)/MCL (ref 4.2–5.4)
SODIUM SERPL-SCNC: 141 MMOL/L (ref 132–146)
WBC # SPEC AUTO: 6.8 X10(3)/MCL (ref 4.5–11.5)

## 2023-01-23 PROCEDURE — 85025 COMPLETE CBC W/AUTO DIFF WBC: CPT

## 2023-01-23 PROCEDURE — 83615 LACTATE (LD) (LDH) ENZYME: CPT

## 2023-01-23 PROCEDURE — 80053 COMPREHEN METABOLIC PANEL: CPT

## 2023-01-23 PROCEDURE — 36415 COLL VENOUS BLD VENIPUNCTURE: CPT

## 2023-01-26 ENCOUNTER — HOSPITAL ENCOUNTER (OUTPATIENT)
Dept: RADIOLOGY | Facility: HOSPITAL | Age: 88
Discharge: HOME | End: 2023-01-26
Attending: INTERNAL MEDICINE
Payer: MEDICARE

## 2023-01-26 DIAGNOSIS — Z85.3 HISTORY OF LEFT BREAST CANCER: ICD-10-CM

## 2023-01-26 DIAGNOSIS — C85.19 B-CELL LYMPHOMA OF EXTRANODAL OR SOLID ORGAN SITE: ICD-10-CM

## 2023-01-26 PROCEDURE — A9552 F18 FDG: HCPCS

## 2023-01-26 PROCEDURE — 78815 PET IMAGE W/CT SKULL-THIGH: CPT | Mod: TC

## 2023-01-27 NOTE — PROGRESS NOTES
Subjective:       Patient ID: Margo Mishra is a 89 y.o. female.    Surgeon: Dr. Lazrao Prado  Primary Care: Dr. Dorian Snow     1. 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          2. Left Breast Cancer Stage IA ([id number]) diagnosed 3/30/17  Biopsy/pathology: Left breast US-guided biopsy done 3/30/17--microinvasive solid papillary carcinoma, grade 2, size cannot be determined, DCIS present, no lymphovascular invasion, ER 95.3%, UT 60.5%, Her2 1+ by IHC, Ki67 13.6%.  Surgery/pathology:   Left breast lumpectomy and SLN biopsy done 17--solid papillary carcinoma, greatest dimension of residual tumor 0.5cm, grade 2, no lymphovascular invasion, margins clear, 1 SLN negative.  Imagin. Bilateral screening MMG 16--persistent asymmetry left breast, right breast good.   2. Left breast diagnostic MMG/US 16--Left breast 3:00 sonographic hypoechoic mass, minimally increased in size from 2015. Follow-up recommended in 6 months.  3. Attempted stereotactic biopsy done 16--patient could not lay prone.  4. Unilateral MMG and Left breast US 3/16/17--slightly irregular lesion 3:00 left breast, BIRADS 4, biopsy recommended.     DEXA:  1. 17 demonstrated osteoporosis in left hip and osteopenia to AP spine and right hip.   2. 19--AP spine -1.3 (was -1.2). Left neck -2.5, Left total -1.4. Right neck -1.7, Right total -1.6. Improved in hips and slight worsening in AP spine.   3. 21--Left neck -2.6, Left total -1.7, Right neck -2.0, Right total -1.7. Left forearm -2.4. Worsening osteoporosis/osteopenia.      Procedures:  Colonoscopy 18 Dr. Higginbotham--Decreased sphincter tone found on digital rectal exam, patent end-to-side ileo-colonic anastomosis, characterized by healthy  appearing mucosa, three  4 to 6 mm polyps in the mid sigmoid colon and in the proximal transverse colon, removed with a hot snare, resected and retrieved--pathology c/w tubular adenoma.     2. 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   Biopsy/Pathology:  1. S/p CT guided biopsy of Left paraspinal mass on 19--Wythe spindled cell neoplasm consistent with schwannoma (neurilemmoma).     Imagin. CT A/P with contrast 18--Wall thickening of the distal rectum could relate to a proctitis but correlation with endoscopy is recommended to exclude mass, complex ventral abdominal wall hernia containing noncompromised  small and large bowel loops, indeterminate cystic structure superior to the vaginal cuff, possibly adnexal. Suggest nonemergent outpatient pelvic ultrasound to further characterize, right renal lesion not convincingly a cyst. Outpatient ultrasound characterization suggested for this finding as well.  2. US retroperitoneum 18--A 2.3 cm hypoechoic area within the right kidney corresponds with the lesion seen by CT. It lacks definitive features of a cyst.  3. CT A/P w & w/o on 18--The right lower pole renal lesion remains indeterminate with equivocal enhancement but size is stable going back to 2018. Annual CT surveillance would probably be reasonable given stability over 5 months. The cystic lesion of the vaginal cuff is also stable.  4. Pelvic US on 18--Vaginal cuff cystic structure demonstrates no convincing solid component or vascularity. Size is similar to January.  5.MRI thoracic spine w/o contrast 19--Numerous lesions within the thoracic spine which do not appear expansile but which appear rather diffuse for intraosseous hemangiomata, contrast-enhanced study would be of value for further  characterization as I cannot exclude neoplastic deposits here, 2.4 cm well marginated left paraspinous mass. CT with and without contrast would be of value for further characterization.  6. CT  thoracic spine w/ and w/o contrast done 1/31/19--Enhancing left paraspinous mass which is nonspecific but could reflect neoplasia such as nerve sheath tumor, ganglioneuroma, or even metastatic deposit among other possibilities, altered bony mineralization which may reflect aggressive decreased bony mineralization with numerous hemangiomata within the thoracic spine, enlarged right thyroid lobe with nodularity. Thyroid sonography may b of benefit as clinically indicated.  7. CT of C/A/P on 2/11/19--2.5 cm x 1.5 cm pleural based paraspinous mass in the left posterior mediastinum at the T4 level which may be contiguous with the adjacent nerve root. Differential considerations include peripheral nerve sheath tumor and pleural-based metastasis. This may be further evaluated with PET scan if clinically indicated. 10 mm groundglass opacity in the left upper lobe. 3 mm groundglass opacity in the lateral segment of the right middle lobe. The appearance is not specific for early pulmonary metastasis but it cannot be excluded. Subcarinal cystlike structure which measures 15 mm in short axis and is favored to represent a pericardial reflection or trace pericardial fluid. Stable low-attenuation structures in the liver. Mild splenomegaly. 3 cm x 2 cm Bosniak type III cyst present in the lower pole the right kidney. The appearance confers a small but significant risk of malignancy and may be further evaluated with continued CT surveillance or ultrasonography if clinically indicated. Low-attenuation lesions in the thoracic spine demonstrate the characteristic CT appearance of hemangiomas. Stable sclerotic foci in the right sacral alar and the left pubic bone are nonspecific but could represent metastatic disease. Bone scan may be used for further evaluation if concern persists.  8. CT C/A/P 7/2/19--stable paraspinous mass, unchanged pulmonary nodules, favor benign etiology, no changed in right renal lower pole cystic lesion,  suggesting benign entity, diffuse osseous demineralization and regional degenerative changes, no acute or aggressive skeletal abnormality.  9. Screening MMG 6/27/22--focal asymmetries in both breasts need further evaluation.  10. Diagnostic bilateral MMG/US done 6/29/22--Irregular, mixed echogenicity mass with irregular margins in the 11:30 right breast, 8 cm from the nipple, is suspicious for malignancy, measures [id number].  Right breast ultrasound-guided biopsy is recommended. Oval, mixed echogenicity masses with circumscribed margins in the 11:00 right breast, 9 cm from the nipple, 1:00 left breast, 9 cm from the nipple, and 3:00 left breast, 7 cm from the nipple, are favored to represent fat necrosis and as such are considered probably benign.   11. PET/CT 9/8/22--Mild, sub threshold uptake at the right breast nodule, subtle subcutaneous nodular densities at the arm, chest and abdomen/pelvis are nonspecific, subtle associated FDG avidity.  For example at the right upper arm nodule max uptake is 3.  Appearance is nonspecific.  These lesions could be related to areas of fat necrosis.  However in a patient with history of extranodal lymphoma at the right breast, additional foci of lymphomatous disease could conceivably have this appearance, ovoid left thoracic paraspinal lesion compatible with biopsy-proven schwannoma. Per literature review benign schwannoma can have moderate FDG avidity similar to this lesion.  12. PET/CT 1/26/23--Grossly similar size and appearance of left upper thoracic paraspinal nodule, with slightly increased metabolic activity. No evidence of new hypermetabolic lesions through the neck, chest, abdomen, or pelvis. Previously described right upper outer breast nodule and additional visualized areas of scattered low level radiotracer activity are stable to slightly improved in the interval.    Treatment history:  1. Left breast lumpectomy 4/26/17.  2. Patient declined radiation.  3. Femara  5/15/17--stopped 8/2022.  4. External beam radiation to right breast lymphoma completed 10/18/22.  5. Prolia 11/6/17--12/2023 (stopped due to being off AI, and also completed 5 years of treatment).     Treatment plan: Prolia every 6 months started 11/6/17. Due for next 12/2022, plan to stop after this treatment    Chief Complaint: Other Misc (Pt reports no new concerns today.)    HPI  Patient presents for evaluation for new breast lymphoma. PET shows stable to improved findings and I went over results with patient and her daughter. She is doing okay. Her arthritis is a little worse since being off the Remicade. She is still on MTX. Denies any other problems.     History reviewed. No pertinent past medical history.   Review of patient's allergies indicates:   Allergen Reactions    Codeine      Other reaction(s): Agitation, Confusion      Current Outpatient Medications on File Prior to Visit   Medication Sig Dispense Refill    amLODIPine (NORVASC) 2.5 MG tablet Take 2.5 mg by mouth once daily.      aspirin (ECOTRIN) 81 MG EC tablet Take 81 mg by mouth once daily.      cinacalcet (SENSIPAR) 30 MG Tab       ergocalciferol (ERGOCALCIFEROL) 50,000 unit Cap Take 50,000 Units by mouth twice a week.      folic acid (FOLVITE) 1 MG tablet Take 1,000 mcg by mouth once daily.      gabapentin (NEURONTIN) 300 MG capsule Take 300 mg by mouth 3 (three) times daily.      HYDROcodone-acetaminophen (NORCO)  mg per tablet Take 1 tablet by mouth 2 (two) times daily as needed.      letrozole (FEMARA) 2.5 mg Tab TAKE 1 TABLET BY MOUTH DAILY 30 tablet 6    methotrexate 2.5 MG Tab TAKE 3 TABLET BY MOUTH EVERY WEEK      PROAIR RESPICLICK 90 mcg/actuation inhaler 1 puff every 4 (four) hours as needed.      TRELEGY ELLIPTA 100-62.5-25 mcg DsDv Take 1 puff by mouth once daily.      meclizine (ANTIVERT) 25 mg tablet Take 1 tablet (25 mg total) by mouth 3 (three) times daily as needed for Dizziness. 30 tablet 1     No current  facility-administered medications on file prior to visit.      Review of Systems   Constitutional:  Negative for appetite change, fatigue, fever and unexpected weight change.   HENT:  Negative for mouth sores.    Eyes: Negative.    Respiratory:  Negative for cough and shortness of breath.    Cardiovascular:  Negative for chest pain and leg swelling.   Gastrointestinal:  Negative for abdominal distention, abdominal pain, constipation, diarrhea, nausea, vomiting and reflux.   Genitourinary:  Negative for difficulty urinating, dysuria and hematuria.   Musculoskeletal:  Positive for arthralgias. Negative for back pain.        Joint deformities from RA, in a wheelchair   Integumentary:  Negative for rash.        No palpable subcutaneous skin lesions as seen on PET.   Neurological:  Negative for weakness and headaches.   Hematological:  Negative for adenopathy.   Psychiatric/Behavioral:  Negative for sleep disturbance. The patient is not nervous/anxious.        Vitals:    01/31/23 0935   BP: 130/76   Pulse: 64   Resp: 14   Temp: 97.7 °F (36.5 °C)     Physical Exam  Constitutional:       Appearance: Normal appearance.      Comments: Elderly white female   HENT:      Head: Normocephalic.      Nose: Nose normal.      Mouth/Throat:      Mouth: Mucous membranes are moist.   Eyes:      Extraocular Movements: Extraocular movements intact.      Conjunctiva/sclera: Conjunctivae normal.   Cardiovascular:      Rate and Rhythm: Normal rate and regular rhythm.   Pulmonary:      Effort: Pulmonary effort is normal.      Breath sounds: Normal breath sounds.   Chest:      Comments: Right breast with no palpable mass, left breast with lumpectomy incision healed well, no masses. No axillary adenopathy  Abdominal:      General: Bowel sounds are normal. There is no distension.      Palpations: Abdomen is soft.      Tenderness: There is no abdominal tenderness.   Musculoskeletal:      Comments: Joint deformities in hands c/w RA changes, in a  wheelchair   Lymphadenopathy:      Comments: No palpable adenopathy   Skin:     General: Skin is warm.   Neurological:      General: No focal deficit present.      Mental Status: She is alert and oriented to person, place, and time.   Psychiatric:         Mood and Affect: Mood normal.         Judgment: Judgment normal.       Lab Visit on 01/23/2023   Component Date Value    Sodium Level 01/23/2023 141     Potassium Level 01/23/2023 3.9     Chloride 01/23/2023 105     Carbon Dioxide 01/23/2023 29     Glucose Level 01/23/2023 115     Blood Urea Nitrogen 01/23/2023 10.6     Creatinine 01/23/2023 1.01     Calcium Level Total 01/23/2023 8.2 (L)     Protein Total 01/23/2023 6.6     Albumin Level 01/23/2023 3.1 (L)     Globulin 01/23/2023 3.5     Albumin/Globulin Ratio 01/23/2023 0.9 (L)     Bilirubin Total 01/23/2023 1.3     Alkaline Phosphatase 01/23/2023 99     Alanine Aminotransferase 01/23/2023 8     Aspartate Aminotransfera* 01/23/2023 14     eGFR 01/23/2023 53     Lactate Dehydrogenase 01/23/2023 214     WBC 01/23/2023 6.8     RBC 01/23/2023 4.12 (L)     Hgb 01/23/2023 12.0     Hct 01/23/2023 38.5     MCV 01/23/2023 93.4     MCH 01/23/2023 29.1     MCHC 01/23/2023 31.2 (L)     RDW 01/23/2023 14.2     Platelet 01/23/2023 173     MPV 01/23/2023 9.8     Neut % 01/23/2023 68.1     Lymph % 01/23/2023 15.5     Mono % 01/23/2023 12.9     Eos % 01/23/2023 2.7     Basophil % 01/23/2023 0.4     Lymph # 01/23/2023 1.05     Neut # 01/23/2023 4.61     Mono # 01/23/2023 0.87     Eos # 01/23/2023 0.18     Baso # 01/23/2023 0.03     IG# 01/23/2023 0.03     IG% 01/23/2023 0.4       Assessment:       1. B-cell lymphoma of extranodal or solid organ site    2. History of left breast cancer      Plan:       Patient with breast cancer stage IA s/p lumpectomy done 4/26/17. ER/[place] positive and Her 2 negative.  Tumor was small 0.5cm although may have been larger but no way to tell since there was not a measurement from the biopsy.  Per [facility]  "guidelines, no chemotherapy recommended due to small size of tumor.  Radiaton offered but patient refused.    On 2/19/19 she had a CT guided left paraspinal mass with results consistent with schwannoma (neurilemomma).   Recommended referral to Neurosurgery for evaluation but she refused since back pain controlled.  Repeat CT C/A/P in 7/2019 showed stable benign pulmonary nodules, no aggressive bone lesion, stable benign parapsinous mass and stable right renal cyst. No need to repeat unless any new symptoms. Outside imaging showed stable paraspinal mass.   Recommended referral to urology for surveillance for renal cyst but she refused this as well and says it has been there for "years".  She quit smoking over 30 years ago so she does not meet criteria for screening CT chest.     She has h/o colon cancer vs. dysplastic polyp in cecum in 2005--colonoscopy was in 2013 Dr. Nelson, 2 polyps removed, both tubular adenomas. Not able to get pathology records from original surgery.  Most recent colonoscopy done 1/2018 showed also polyps c/w tubular adnenomas.     Treatment with AI alone recommended and patient started Femara on 5/15/17.  DEXA from 2017 showed osteoporosis. Patient started on Prolia.  DEXA continued to decline, most recent in 9/2021 was worse.  Recommended for patient to stop Femara in 5/2022, but she did not stop, stopped in 8/2022. Completed 5 years of Prolia in 12/2022 and stopped.     Calcium previously stopped due to diagnosis of primary hyperparathyroidism. She is taking high dose Vitamin D as she does not desire any surgery. Closely monitored by Dr. Escalera.     Patient diagnosed with right breast low-grade B-cell lymphoma, 8/3/22, mostly c/w marginal zone lymphoma.     She had no B-symptoms and no palpable adenopathy. LDH elevated, but she also has known RA.    PET/CT 9/8/22 showed nonspecific subcutaneous lesions, right upper arm posterior, left upper back posterior and right posterior back as well as " right trochanter hypermetabolism which may be arthritis, her known schwannoma also showed up on the PET.   Of note, there were also multiple other additional lesions noted on MMG/US throughout both breasts that were possible additional sites of lymphoma. But did not show up on PET.    Due to her advanced age, she did not really want any surgery so radiation recommend and she completed on EBRT to right breast lymphoma on 10/18/22.  Repeat PET done 1/23/23 shows stable to improved findings.  Recent labs all good. LDH WNL.         Will have patient RTC in 6 months for follow-up with repeat labs and repeat PET.  Will order diagnostic MMG/US bilateral for 6/2023 to keep a watch on the additional lesions as seen on last MMG.    Case previously discussed with Rheumatology Dr. Selby,and Remicade held for now. Can possibly restart at a later date if it is affecting her QOL. Okay to continue MTX.       All questions answered.          Yenifer John MD           Addendum: Faint monoclonal band on labs from nephrologist from 6/22/23.  Will repeat in 8/2023 with labs and will also check K/L ratio.    Yenifer John MD

## 2023-01-31 ENCOUNTER — OFFICE VISIT (OUTPATIENT)
Dept: HEMATOLOGY/ONCOLOGY | Facility: CLINIC | Age: 88
End: 2023-01-31
Payer: MEDICARE

## 2023-01-31 VITALS
BODY MASS INDEX: 41.71 KG/M2 | WEIGHT: 244.31 LBS | TEMPERATURE: 98 F | SYSTOLIC BLOOD PRESSURE: 130 MMHG | HEART RATE: 64 BPM | RESPIRATION RATE: 14 BRPM | DIASTOLIC BLOOD PRESSURE: 76 MMHG | OXYGEN SATURATION: 98 % | HEIGHT: 64 IN

## 2023-01-31 DIAGNOSIS — C85.19 B-CELL LYMPHOMA OF EXTRANODAL OR SOLID ORGAN SITE: Primary | ICD-10-CM

## 2023-01-31 DIAGNOSIS — Z85.3 HISTORY OF LEFT BREAST CANCER: ICD-10-CM

## 2023-01-31 DIAGNOSIS — Z78.0 MENOPAUSE: ICD-10-CM

## 2023-01-31 PROCEDURE — 99999 PR PBB SHADOW E&M-EST. PATIENT-LVL V: CPT | Mod: PBBFAC,,, | Performed by: INTERNAL MEDICINE

## 2023-01-31 PROCEDURE — 99214 PR OFFICE/OUTPT VISIT, EST, LEVL IV, 30-39 MIN: ICD-10-PCS | Mod: S$PBB,,, | Performed by: INTERNAL MEDICINE

## 2023-01-31 PROCEDURE — 99214 OFFICE O/P EST MOD 30 MIN: CPT | Mod: S$PBB,,, | Performed by: INTERNAL MEDICINE

## 2023-01-31 PROCEDURE — 99999 PR PBB SHADOW E&M-EST. PATIENT-LVL V: ICD-10-PCS | Mod: PBBFAC,,, | Performed by: INTERNAL MEDICINE

## 2023-01-31 PROCEDURE — 99215 OFFICE O/P EST HI 40 MIN: CPT | Mod: PBBFAC | Performed by: INTERNAL MEDICINE

## 2023-01-31 RX ORDER — HYDROCODONE BITARTRATE AND ACETAMINOPHEN 10; 325 MG/1; MG/1
1 TABLET ORAL 2 TIMES DAILY PRN
COMMUNITY
Start: 2022-11-17

## 2023-01-31 RX ORDER — ASPIRIN 81 MG/1
81 TABLET ORAL DAILY
COMMUNITY

## 2023-01-31 RX ORDER — ALBUTEROL SULFATE 90 UG/1
1 POWDER, METERED RESPIRATORY (INHALATION) EVERY 4 HOURS PRN
COMMUNITY
Start: 2022-10-29

## 2023-02-14 ENCOUNTER — HOSPITAL ENCOUNTER (OUTPATIENT)
Dept: RADIOLOGY | Facility: HOSPITAL | Age: 88
Discharge: HOME | End: 2023-02-14
Attending: NURSE PRACTITIONER
Payer: MEDICARE

## 2023-02-14 DIAGNOSIS — M25.511 RIGHT SHOULDER PAIN: ICD-10-CM

## 2023-02-14 DIAGNOSIS — C85.19 CUTANEOUS B-CELL LYMPHOMA: ICD-10-CM

## 2023-02-14 DIAGNOSIS — R52 PAIN: ICD-10-CM

## 2023-02-14 PROCEDURE — 73030 X-RAY EXAM OF SHOULDER: CPT | Mod: TC,RT

## 2023-02-14 PROCEDURE — 73060 X-RAY EXAM OF HUMERUS: CPT | Mod: TC,RT

## 2023-06-14 ENCOUNTER — HOSPITAL ENCOUNTER (OUTPATIENT)
Dept: RADIOLOGY | Facility: HOSPITAL | Age: 88
Discharge: HOME | End: 2023-06-14
Attending: INTERNAL MEDICINE
Payer: MEDICARE

## 2023-06-14 VITALS — HEIGHT: 64 IN | BODY MASS INDEX: 41.66 KG/M2 | WEIGHT: 244 LBS

## 2023-06-14 DIAGNOSIS — C85.19 B-CELL LYMPHOMA OF EXTRANODAL OR SOLID ORGAN SITE: ICD-10-CM

## 2023-06-14 DIAGNOSIS — Z85.3 HISTORY OF LEFT BREAST CANCER: ICD-10-CM

## 2023-06-14 PROCEDURE — 77062 BREAST TOMOSYNTHESIS BI: CPT | Mod: 26,,, | Performed by: STUDENT IN AN ORGANIZED HEALTH CARE EDUCATION/TRAINING PROGRAM

## 2023-06-14 PROCEDURE — 77062 MAMMO DIGITAL DIAGNOSTIC BILAT WITH TOMO: ICD-10-PCS | Mod: 26,,, | Performed by: STUDENT IN AN ORGANIZED HEALTH CARE EDUCATION/TRAINING PROGRAM

## 2023-06-14 PROCEDURE — 77066 DX MAMMO INCL CAD BI: CPT | Mod: 26,,, | Performed by: STUDENT IN AN ORGANIZED HEALTH CARE EDUCATION/TRAINING PROGRAM

## 2023-06-14 PROCEDURE — 77062 BREAST TOMOSYNTHESIS BI: CPT | Mod: TC

## 2023-06-14 PROCEDURE — 76642 ULTRASOUND BREAST LIMITED: CPT | Mod: TC,50

## 2023-06-14 PROCEDURE — 76642 ULTRASOUND BREAST LIMITED: CPT | Mod: 26,50,, | Performed by: STUDENT IN AN ORGANIZED HEALTH CARE EDUCATION/TRAINING PROGRAM

## 2023-06-14 PROCEDURE — 76642 US BREAST BILATERAL LIMITED: ICD-10-PCS | Mod: 26,50,, | Performed by: STUDENT IN AN ORGANIZED HEALTH CARE EDUCATION/TRAINING PROGRAM

## 2023-06-14 PROCEDURE — 77066 MAMMO DIGITAL DIAGNOSTIC BILAT WITH TOMO: ICD-10-PCS | Mod: 26,,, | Performed by: STUDENT IN AN ORGANIZED HEALTH CARE EDUCATION/TRAINING PROGRAM

## 2023-07-26 ENCOUNTER — HOSPITAL ENCOUNTER (OUTPATIENT)
Dept: RADIOLOGY | Facility: HOSPITAL | Age: 88
Discharge: HOME | End: 2023-07-26
Attending: INTERNAL MEDICINE
Payer: MEDICARE

## 2023-07-26 DIAGNOSIS — Z85.3 HISTORY OF LEFT BREAST CANCER: ICD-10-CM

## 2023-07-26 DIAGNOSIS — C85.19 B-CELL LYMPHOMA OF EXTRANODAL OR SOLID ORGAN SITE: ICD-10-CM

## 2023-07-26 PROCEDURE — A9552 F18 FDG: HCPCS

## 2023-08-08 PROBLEM — C50.419 MALIGNANT NEOPLASM OF UPPER-OUTER QUADRANT OF FEMALE BREAST: Status: ACTIVE | Noted: 2023-08-08

## 2023-08-08 PROBLEM — M81.0 OSTEOPOROSIS: Status: ACTIVE | Noted: 2023-08-08

## 2023-08-08 PROBLEM — N32.81 DETRUSOR MUSCLE HYPERTONIA: Status: ACTIVE | Noted: 2023-08-08

## 2023-08-08 PROBLEM — K80.50 COMMON BILE DUCT CALCULUS: Status: ACTIVE | Noted: 2023-08-08

## 2023-08-08 PROBLEM — J43.9 PULMONARY EMPHYSEMA: Status: ACTIVE | Noted: 2023-08-08

## 2023-08-08 PROBLEM — M19.90 ARTHRITIS: Status: ACTIVE | Noted: 2023-08-08

## 2023-08-08 PROBLEM — E04.9 GOITER: Status: ACTIVE | Noted: 2023-08-08

## 2023-08-08 PROBLEM — K43.2 INCISIONAL HERNIA: Status: ACTIVE | Noted: 2023-08-08

## 2023-08-08 PROBLEM — R91.8 LUNG MASS: Status: ACTIVE | Noted: 2023-08-08

## 2023-08-08 PROBLEM — N28.9 KIDNEY DISORDER: Status: ACTIVE | Noted: 2023-08-08

## 2023-08-08 NOTE — PROGRESS NOTES
Subjective:       Patient ID: Margo Mishra is a 89 y.o. female.    Surgeon: Dr. Lazaro Prado  Primary Care: Dr. Dorian Snow     1. 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          2. Left Breast Cancer Stage IA ([id number]) diagnosed 3/30/17  Biopsy/pathology: Left breast US-guided biopsy done 3/30/17--microinvasive solid papillary carcinoma, grade 2, size cannot be determined, DCIS present, no lymphovascular invasion, ER 95.3%, AZ 60.5%, Her2 1+ by IHC, Ki67 13.6%.  Surgery/pathology:   Left breast lumpectomy and SLN biopsy done 17--solid papillary carcinoma, greatest dimension of residual tumor 0.5cm, grade 2, no lymphovascular invasion, margins clear, 1 SLN negative.  Imagin. Bilateral screening MMG 16--persistent asymmetry left breast, right breast good.   2. Left breast diagnostic MMG/US 16--Left breast 3:00 sonographic hypoechoic mass, minimally increased in size from 2015. Follow-up recommended in 6 months.  3. Attempted stereotactic biopsy done 16--patient could not lay prone.  4. Unilateral MMG and Left breast US 3/16/17--slightly irregular lesion 3:00 left breast, BIRADS 4, biopsy recommended.     DEXA:  1. 17 demonstrated osteoporosis in left hip and osteopenia to AP spine and right hip.   2. 19--AP spine -1.3 (was -1.2). Left neck -2.5, Left total -1.4. Right neck -1.7, Right total -1.6. Improved in hips and slight worsening in AP spine.   3. 21--Left neck -2.6, Left total -1.7, Right neck -2.0, Right total -1.7. Left forearm -2.4. Worsening osteoporosis/osteopenia.      Procedures:  Colonoscopy 18 Dr. Higginbotham--Decreased sphincter tone found on digital rectal exam, patent end-to-side ileo-colonic anastomosis, characterized by healthy  appearing mucosa, three  4 to 6 mm polyps in the mid sigmoid colon and in the proximal transverse colon, removed with a hot snare, resected and retrieved--pathology c/w tubular adenoma.     2. 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   Biopsy/Pathology:  1. 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     Imagin. CT A/P with contrast 18--Wall thickening of the distal rectum could relate to a proctitis but correlation with endoscopy is recommended to exclude mass, complex ventral abdominal wall hernia containing noncompromised  small and large bowel loops, indeterminate cystic structure superior to the vaginal cuff, possibly adnexal. Suggest nonemergent outpatient pelvic ultrasound to further characterize, right renal lesion not convincingly a cyst. Outpatient ultrasound characterization suggested for this finding as well.  2. US retroperitoneum 18--A 2.3 cm hypoechoic area within the right kidney corresponds with the lesion seen by CT. It lacks definitive features of a cyst.  3. CT A/P w & w/o on 18--The right lower pole renal lesion remains indeterminate with equivocal enhancement but size is stable going back to 2018. Annual CT surveillance would probably be reasonable given stability over 5 months. The cystic lesion of the vaginal cuff is also stable.  4. Pelvic US on 18--Vaginal cuff cystic structure demonstrates no convincing solid component or vascularity. Size is similar to January.  5.MRI thoracic spine w/o contrast 19--Numerous lesions within the thoracic spine which do not appear expansile but which appear rather diffuse for intraosseous hemangiomata, contrast-enhanced study would be of value for further  characterization as I cannot exclude neoplastic deposits here, 2.4 cm well marginated left paraspinous mass. CT with and without contrast would be of value for further characterization.  6. CT  thoracic spine w/ and w/o contrast done 1/31/19--Enhancing left paraspinous mass which is nonspecific but could reflect neoplasia such as nerve sheath tumor, ganglioneuroma, or even metastatic deposit among other possibilities, altered bony mineralization which may reflect aggressive decreased bony mineralization with numerous hemangiomata within the thoracic spine, enlarged right thyroid lobe with nodularity. Thyroid sonography may b of benefit as clinically indicated.  7. CT of C/A/P on 2/11/19--2.5 cm x 1.5 cm pleural based paraspinous mass in the left posterior mediastinum at the T4 level which may be contiguous with the adjacent nerve root. Differential considerations include peripheral nerve sheath tumor and pleural-based metastasis. This may be further evaluated with PET scan if clinically indicated. 10 mm groundglass opacity in the left upper lobe. 3 mm groundglass opacity in the lateral segment of the right middle lobe. The appearance is not specific for early pulmonary metastasis but it cannot be excluded. Subcarinal cystlike structure which measures 15 mm in short axis and is favored to represent a pericardial reflection or trace pericardial fluid. Stable low-attenuation structures in the liver. Mild splenomegaly. 3 cm x 2 cm Bosniak type III cyst present in the lower pole the right kidney. The appearance confers a small but significant risk of malignancy and may be further evaluated with continued CT surveillance or ultrasonography if clinically indicated. Low-attenuation lesions in the thoracic spine demonstrate the characteristic CT appearance of hemangiomas. Stable sclerotic foci in the right sacral alar and the left pubic bone are nonspecific but could represent metastatic disease. Bone scan may be used for further evaluation if concern persists.  8. CT C/A/P 7/2/19--stable paraspinous mass, unchanged pulmonary nodules, favor benign etiology, no changed in right renal lower pole cystic lesion,  suggesting benign entity, diffuse osseous demineralization and regional degenerative changes, no acute or aggressive skeletal abnormality.  9. Screening MMG 6/27/22--focal asymmetries in both breasts need further evaluation.  10. Diagnostic bilateral MMG/US done 6/29/22--Irregular, mixed echogenicity mass with irregular margins in the 11:30 right breast, 8 cm from the nipple, is suspicious for malignancy, measures [id number].  Right breast ultrasound-guided biopsy is recommended. Oval, mixed echogenicity masses with circumscribed margins in the 11:00 right breast, 9 cm from the nipple, 1:00 left breast, 9 cm from the nipple, and 3:00 left breast, 7 cm from the nipple, are favored to represent fat necrosis and as such are considered probably benign.   11. PET/CT 9/8/22--Mild, sub threshold uptake at the right breast nodule, subtle subcutaneous nodular densities at the arm, chest and abdomen/pelvis are nonspecific, subtle associated FDG avidity.  For example at the right upper arm nodule max uptake is 3.  Appearance is nonspecific.  These lesions could be related to areas of fat necrosis.  However in a patient with history of extranodal lymphoma at the right breast, additional foci of lymphomatous disease could conceivably have this appearance, ovoid left thoracic paraspinal lesion compatible with biopsy-proven schwannoma. Per literature review benign schwannoma can have moderate FDG avidity similar to this lesion.  12. PET/CT 1/26/23--Grossly similar size and appearance of left upper thoracic paraspinal nodule, with slightly increased metabolic activity. No evidence of new hypermetabolic lesions through the neck, chest, abdomen, or pelvis. Previously described right upper outer breast nodule and additional visualized areas of scattered low level radiotracer activity are stable to slightly improved in the interval.  13. PET/CT on 7/26/23--No evidence of new or progressive hypermetabolic lymphadenopathy. Stable  hypermetabolic left paraspinal mass.    Treatment history:  1. Left breast lumpectomy 4/26/17.  2. Patient declined radiation.  3. Femara 5/15/17--stopped 8/2022.  4. External beam radiation to right breast lymphoma completed 10/18/22.  5. Prolia 11/6/17--12/2023 (stopped due to being off AI, and also completed 5 years of treatment).     Treatment plan: Observation.     Chief Complaint: Other Misc (Pt reports no new concerns today.)    HPI  Patient presents for evaluation for new breast lymphoma. She is doing well. PET shows stable findings (SUNG) and I went over results with patient and her son. She is doing okay. Her arthritis is a little worse since being off the Remicade. She is still on MTX. Complains of frequent urination for awhile now but no longer has a PCP. Denies any n/v/c/d. Appetite good and weight stable. Denies any fevers or weight loss. Denies any other problems.     History reviewed. No pertinent past medical history.   Review of patient's allergies indicates:   Allergen Reactions    Codeine      Other reaction(s): Agitation, Confusion      Current Outpatient Medications on File Prior to Visit   Medication Sig Dispense Refill    amLODIPine (NORVASC) 2.5 MG tablet Take 2.5 mg by mouth once daily.      aspirin (ECOTRIN) 81 MG EC tablet Take 81 mg by mouth once daily.      cinacalcet (SENSIPAR) 30 MG Tab       ergocalciferol (ERGOCALCIFEROL) 50,000 unit Cap Take 50,000 Units by mouth twice a week. Pt states that she takes once a month.      folic acid (FOLVITE) 1 MG tablet Take 1,000 mcg by mouth once daily.      gabapentin (NEURONTIN) 300 MG capsule Take 300 mg by mouth 3 (three) times daily.      HYDROcodone-acetaminophen (NORCO)  mg per tablet Take 1 tablet by mouth 2 (two) times daily as needed.      methotrexate 2.5 MG Tab TAKE 3 TABLET BY MOUTH EVERY WEEK      oxybutynin (DITROPAN) 5 MG Tab Take 5 mg by mouth.      PROAIR RESPICLICK 90 mcg/actuation inhaler 1 puff every 4 (four) hours as  needed.      TRELEGY ELLIPTA 100-62.5-25 mcg DsDv Take 1 puff by mouth once daily.      [DISCONTINUED] letrozole (FEMARA) 2.5 mg Tab TAKE 1 TABLET BY MOUTH DAILY (Patient taking differently: Pt states that she has never taken..) 30 tablet 6    meclizine (ANTIVERT) 25 mg tablet Take 1 tablet (25 mg total) by mouth 3 (three) times daily as needed for Dizziness. 30 tablet 1     No current facility-administered medications on file prior to visit.      Review of Systems   Constitutional:  Negative for appetite change, fatigue, fever and unexpected weight change.   HENT:  Negative for mouth sores.    Eyes: Negative.    Respiratory:  Negative for cough and shortness of breath.    Cardiovascular:  Negative for chest pain and leg swelling.   Gastrointestinal:  Negative for abdominal distention, abdominal pain, constipation, diarrhea, nausea, vomiting and reflux.   Genitourinary:  Positive for frequency. Negative for difficulty urinating, dysuria and hematuria.   Musculoskeletal:  Positive for arthralgias. Negative for back pain.        Joint deformities from RA, in a wheelchair   Integumentary:  Negative for rash.   Neurological:  Negative for weakness and headaches.   Hematological:  Negative for adenopathy.   Psychiatric/Behavioral:  Negative for sleep disturbance. The patient is not nervous/anxious.          Vitals:    08/09/23 1337   BP: 137/79   Pulse: 64   Resp: 14   Temp: 97.7 °F (36.5 °C)       Physical Exam  Constitutional:       Appearance: Normal appearance.      Comments: Elderly white female   HENT:      Head: Normocephalic.      Nose: Nose normal.      Mouth/Throat:      Mouth: Mucous membranes are moist.   Eyes:      Extraocular Movements: Extraocular movements intact.      Conjunctiva/sclera: Conjunctivae normal.   Cardiovascular:      Rate and Rhythm: Normal rate and regular rhythm.   Pulmonary:      Effort: Pulmonary effort is normal.      Breath sounds: Normal breath sounds.   Chest:      Comments: Right  breast with no palpable mass, left breast with lumpectomy incision healed well, no masses. No axillary adenopathy  Abdominal:      General: Abdomen is protuberant. Bowel sounds are normal. There is no distension.      Palpations: Abdomen is soft.      Tenderness: There is no abdominal tenderness.      Comments: Umbilical hernia present   Musculoskeletal:      Comments: Joint deformities in hands c/w RA changes, in a wheelchair   Lymphadenopathy:      Comments: No palpable adenopathy   Skin:     General: Skin is warm.   Neurological:      General: No focal deficit present.      Mental Status: She is alert and oriented to person, place, and time.   Psychiatric:         Mood and Affect: Mood normal.         Judgment: Judgment normal.       Lab Visit on 08/09/2023   Component Date Value    WBC 08/09/2023 5.77     RBC 08/09/2023 4.30     Hgb 08/09/2023 12.5     Hct 08/09/2023 39.3     MCV 08/09/2023 91.4     MCH 08/09/2023 29.1     MCHC 08/09/2023 31.8 (L)     RDW 08/09/2023 13.9     Platelet 08/09/2023 190     MPV 08/09/2023 8.8     Neut % 08/09/2023 75.9     Lymph % 08/09/2023 13.3     Mono % 08/09/2023 8.3     Eos % 08/09/2023 1.7     Basophil % 08/09/2023 0.5     Lymph # 08/09/2023 0.77     Neut # 08/09/2023 4.37     Mono # 08/09/2023 0.48     Eos # 08/09/2023 0.10     Baso # 08/09/2023 0.03     IG# 08/09/2023 0.02     IG% 08/09/2023 0.3       Assessment:       1. B-cell lymphoma of extranodal or solid organ site    2. History of left breast cancer    3. Age-related osteoporosis without current pathological fracture      Plan:       Patient with breast cancer stage IA s/p lumpectomy done 4/26/17. ER/DC positive and Her 2 negative.  Tumor was small 0.5cm although may have been larger but no way to tell since there was not a measurement from the biopsy.  Per NCCN guidelines, no chemotherapy recommended due to small size of tumor.  Radiaton offered but patient refused.    On 2/19/19 she had a CT guided left paraspinal  "mass with results consistent with schwannoma (neurilemomma).   Recommended referral to Neurosurgery for evaluation but she refused since back pain controlled.  Repeat CT C/A/P in 7/2019 showed stable benign pulmonary nodules, no aggressive bone lesion, stable benign parapsinous mass and stable right renal cyst. No need to repeat unless any new symptoms. Outside imaging showed stable paraspinal mass.   Recommended referral to urology for surveillance for renal cyst but she refused this as well and says it has been there for "years".  She quit smoking over 30 years ago so she does not meet criteria for LDCT.     Treatment with AI alone recommended and patient started Femara on 5/15/17.  DEXA from 2017 showed osteoporosis. Patient started on Prolia.  DEXA continued to decline, most recent in 9/2021 was worse.  Recommended for patient to stop Femara in 5/2022, but she did not stop until 8/2022. Completed 5 years of Prolia in 12/2022 and stopped.   No calcium supplements due to primary hyperparathyroidism. She is taking high dose Vitamin D as she does not desire any surgery. Closely monitored by Dr. Escalera.     Patient diagnosed with right breast low-grade B-cell lymphoma, 8/3/22, mostly c/w marginal zone lymphoma.  She had no B-symptoms and no palpable adenopathy. LDH elevated, but she also has known RA.  PET/CT 9/8/22 showed nonspecific subcutaneous lesions, right upper arm posterior, left upper back posterior and right posterior back as well as right trochanter hypermetabolism which may be arthritis, her known schwannoma also showed up on the PET.   Of note, there were also multiple other additional lesions noted on MMG/US throughout both breasts that were possible additional sites of lymphoma. But did not show up on PET.  Due to her advanced age, she did not want any surgery so radiation recommend and she completed EBRT to right breast lymphoma on 10/18/22.  Repeat PET done on 7/26/23 with SUNG.   Faint monoclonal " band on labs from Dr. Selby from 6/22/23.  Repeated today with results pending.   Will have patient RTC in 6 months for follow-up with repeat labs and repeat PET.    Repeat bilateral MMG on 6/14/23-- The biopsy-proven B-cell lymphoma mass in the right breast 11:30 axis 8 cm FN position and presumed additional B-cell lymphoma masses in both breasts (right breast 11:00 axis 9 cm FN, left breast 1:00 axis 9 cm FN, and left breast 3:00 axis 7 cm FN) have not undergone surgical excision.  Only the right breast has undergone external beam radiation therapy.    The patient has refused surgery and chemotherapy for treatment, given her age and health status. Therefore, recommend bilateral diagnostic mammogram with tomosynthesis and bilateral limited breast ultrasounds in June, 2024.      Case previously discussed with Rheumatology Dr. Selby,and Remicade held for now. Can possibly restart at a later date if it is affecting her QOL. Okay to continue MTX.     She has h/o colon cancer vs. dysplastic polyp in cecum in 2005--colonoscopy was in 2013 Dr. Nelson, 2 polyps removed, both tubular adenomas. Not able to get pathology records from original surgery.  Most recent colonoscopy done 1/2018 showed also polyps c/w tubular adnenomas.    All questions answered.        Honorio Vizcaino, RISA

## 2023-08-09 ENCOUNTER — OFFICE VISIT (OUTPATIENT)
Dept: HEMATOLOGY/ONCOLOGY | Facility: CLINIC | Age: 88
End: 2023-08-09
Attending: INTERNAL MEDICINE
Payer: MEDICARE

## 2023-08-09 VITALS
SYSTOLIC BLOOD PRESSURE: 137 MMHG | HEART RATE: 64 BPM | HEIGHT: 66 IN | BODY MASS INDEX: 38.41 KG/M2 | DIASTOLIC BLOOD PRESSURE: 79 MMHG | WEIGHT: 239 LBS | OXYGEN SATURATION: 97 % | RESPIRATION RATE: 14 BRPM | TEMPERATURE: 98 F

## 2023-08-09 DIAGNOSIS — M81.0 AGE-RELATED OSTEOPOROSIS WITHOUT CURRENT PATHOLOGICAL FRACTURE: ICD-10-CM

## 2023-08-09 DIAGNOSIS — C85.19 B-CELL LYMPHOMA OF EXTRANODAL OR SOLID ORGAN SITE: Primary | ICD-10-CM

## 2023-08-09 DIAGNOSIS — Z85.3 HISTORY OF LEFT BREAST CANCER: ICD-10-CM

## 2023-08-09 PROCEDURE — 99999 PR PBB SHADOW E&M-EST. PATIENT-LVL IV: CPT | Mod: PBBFAC,,, | Performed by: NURSE PRACTITIONER

## 2023-08-09 PROCEDURE — 99214 OFFICE O/P EST MOD 30 MIN: CPT | Mod: S$PBB,,, | Performed by: NURSE PRACTITIONER

## 2023-08-09 PROCEDURE — 99214 OFFICE O/P EST MOD 30 MIN: CPT | Mod: PBBFAC | Performed by: NURSE PRACTITIONER

## 2023-08-09 PROCEDURE — 99999 PR PBB SHADOW E&M-EST. PATIENT-LVL IV: ICD-10-PCS | Mod: PBBFAC,,, | Performed by: NURSE PRACTITIONER

## 2023-08-09 PROCEDURE — 99214 PR OFFICE/OUTPT VISIT, EST, LEVL IV, 30-39 MIN: ICD-10-PCS | Mod: S$PBB,,, | Performed by: NURSE PRACTITIONER

## 2023-08-09 RX ORDER — OXYBUTYNIN CHLORIDE 5 MG/1
5 TABLET ORAL
COMMUNITY
Start: 2021-09-14

## 2024-02-05 PROBLEM — Z17.0 MALIGNANT NEOPLASM OF UPPER-OUTER QUADRANT OF LEFT BREAST IN FEMALE, ESTROGEN RECEPTOR POSITIVE: Status: ACTIVE | Noted: 2023-08-08

## 2024-02-05 PROBLEM — C50.412 MALIGNANT NEOPLASM OF UPPER-OUTER QUADRANT OF LEFT BREAST IN FEMALE, ESTROGEN RECEPTOR POSITIVE: Status: ACTIVE | Noted: 2023-08-08

## 2024-02-06 ENCOUNTER — TELEPHONE (OUTPATIENT)
Dept: HEMATOLOGY/ONCOLOGY | Facility: CLINIC | Age: 89
End: 2024-02-06
Payer: MEDICARE

## 2024-02-26 ENCOUNTER — LAB REQUISITION (OUTPATIENT)
Dept: LAB | Facility: HOSPITAL | Age: 89
End: 2024-02-26
Payer: MEDICARE

## 2024-02-26 DIAGNOSIS — E55.9 VITAMIN D DEFICIENCY, UNSPECIFIED: ICD-10-CM

## 2024-02-26 DIAGNOSIS — E21.3 HYPERPARATHYROIDISM, UNSPECIFIED: ICD-10-CM

## 2024-02-26 DIAGNOSIS — M06.9 RHEUMATOID ARTHRITIS, UNSPECIFIED: ICD-10-CM

## 2024-02-26 DIAGNOSIS — J44.9 CHRONIC OBSTRUCTIVE PULMONARY DISEASE, UNSPECIFIED: ICD-10-CM

## 2024-02-26 LAB
ALBUMIN SERPL-MCNC: 3.1 G/DL (ref 3.4–4.8)
ALBUMIN/GLOB SERPL: 1.1 RATIO (ref 1.1–2)
ALP SERPL-CCNC: 145 UNIT/L (ref 40–150)
ALT SERPL-CCNC: 8 UNIT/L (ref 0–55)
AST SERPL-CCNC: 16 UNIT/L (ref 5–34)
BASOPHILS # BLD AUTO: 0.03 X10(3)/MCL
BASOPHILS NFR BLD AUTO: 0.6 %
BILIRUB SERPL-MCNC: 1.4 MG/DL
BUN SERPL-MCNC: 16.3 MG/DL (ref 9.8–20.1)
CALCIUM SERPL-MCNC: 8.1 MG/DL (ref 8.4–10.2)
CHLORIDE SERPL-SCNC: 111 MMOL/L (ref 98–111)
CHOLEST SERPL-MCNC: 112 MG/DL
CHOLEST/HDLC SERPL: 2 {RATIO} (ref 0–5)
CO2 SERPL-SCNC: 26 MMOL/L (ref 23–31)
CREAT SERPL-MCNC: 0.74 MG/DL (ref 0.55–1.02)
DEPRECATED CALCIDIOL+CALCIFEROL SERPL-MC: 27.7 NG/ML (ref 30–80)
EOSINOPHIL # BLD AUTO: 0.16 X10(3)/MCL (ref 0–0.9)
EOSINOPHIL NFR BLD AUTO: 3.2 %
ERYTHROCYTE [DISTWIDTH] IN BLOOD BY AUTOMATED COUNT: 14.2 % (ref 11.5–17)
GFR SERPLBLD CREATININE-BSD FMLA CKD-EPI: >60 MLS/MIN/1.73/M2
GLOBULIN SER-MCNC: 2.8 GM/DL (ref 2.4–3.5)
GLUCOSE SERPL-MCNC: 85 MG/DL (ref 75–121)
HCT VFR BLD AUTO: 35.4 % (ref 37–47)
HDLC SERPL-MCNC: 51 MG/DL (ref 35–60)
HGB BLD-MCNC: 11.9 G/DL (ref 12–16)
IMM GRANULOCYTES # BLD AUTO: 0.02 X10(3)/MCL (ref 0–0.04)
IMM GRANULOCYTES NFR BLD AUTO: 0.4 %
LDLC SERPL CALC-MCNC: 49 MG/DL (ref 50–140)
LYMPHOCYTES # BLD AUTO: 0.94 X10(3)/MCL (ref 0.6–4.6)
LYMPHOCYTES NFR BLD AUTO: 19 %
MCH RBC QN AUTO: 30.3 PG (ref 27–31)
MCHC RBC AUTO-ENTMCNC: 33.6 G/DL (ref 33–36)
MCV RBC AUTO: 90.1 FL (ref 80–94)
MONOCYTES # BLD AUTO: 0.49 X10(3)/MCL (ref 0.1–1.3)
MONOCYTES NFR BLD AUTO: 9.9 %
NEUTROPHILS # BLD AUTO: 3.32 X10(3)/MCL (ref 2.1–9.2)
NEUTROPHILS NFR BLD AUTO: 66.9 %
NRBC BLD AUTO-RTO: 0 %
PLATELET # BLD AUTO: 189 X10(3)/MCL (ref 130–400)
PMV BLD AUTO: 10.5 FL (ref 7.4–10.4)
POTASSIUM SERPL-SCNC: 3.9 MMOL/L (ref 3.5–5.1)
PROT SERPL-MCNC: 5.9 GM/DL (ref 5.8–7.6)
RBC # BLD AUTO: 3.93 X10(6)/MCL (ref 4.2–5.4)
SODIUM SERPL-SCNC: 143 MMOL/L (ref 132–146)
TRIGL SERPL-MCNC: 58 MG/DL (ref 37–140)
TSH SERPL-ACNC: 2.86 UIU/ML (ref 0.35–4.94)
VLDLC SERPL CALC-MCNC: 12 MG/DL
WBC # SPEC AUTO: 4.96 X10(3)/MCL (ref 4.5–11.5)

## 2024-02-26 PROCEDURE — 80053 COMPREHEN METABOLIC PANEL: CPT | Performed by: FAMILY MEDICINE

## 2024-02-26 PROCEDURE — 82306 VITAMIN D 25 HYDROXY: CPT | Performed by: FAMILY MEDICINE

## 2024-02-26 PROCEDURE — 85025 COMPLETE CBC W/AUTO DIFF WBC: CPT | Performed by: FAMILY MEDICINE

## 2024-02-26 PROCEDURE — 84443 ASSAY THYROID STIM HORMONE: CPT | Performed by: FAMILY MEDICINE

## 2024-02-26 PROCEDURE — 80061 LIPID PANEL: CPT | Performed by: FAMILY MEDICINE

## 2024-03-18 ENCOUNTER — LAB REQUISITION (OUTPATIENT)
Dept: LAB | Facility: HOSPITAL | Age: 89
End: 2024-03-18
Payer: MEDICARE

## 2024-03-18 DIAGNOSIS — E21.3 HYPERPARATHYROIDISM, UNSPECIFIED: ICD-10-CM

## 2024-03-18 LAB — PTH-INTACT SERPL-MCNC: 55.8 PG/ML (ref 8.7–77)

## 2024-03-18 PROCEDURE — 83970 ASSAY OF PARATHORMONE: CPT | Performed by: FAMILY MEDICINE

## 2024-05-07 ENCOUNTER — LAB REQUISITION (OUTPATIENT)
Dept: LAB | Facility: HOSPITAL | Age: 89
End: 2024-05-07
Payer: MEDICARE

## 2024-05-07 DIAGNOSIS — E55.9 VITAMIN D DEFICIENCY, UNSPECIFIED: ICD-10-CM

## 2024-05-07 LAB — DEPRECATED CALCIDIOL+CALCIFEROL SERPL-MC: 22.6 NG/ML (ref 30–80)

## 2024-05-07 PROCEDURE — 82306 VITAMIN D 25 HYDROXY: CPT | Performed by: FAMILY MEDICINE

## 2024-07-08 ENCOUNTER — HOSPITAL ENCOUNTER (OUTPATIENT)
Dept: RADIOLOGY | Facility: HOSPITAL | Age: 89
Discharge: HOME | End: 2024-07-08
Attending: NURSE PRACTITIONER
Payer: MEDICARE

## 2024-07-08 DIAGNOSIS — C85.19 B-CELL LYMPHOMA OF EXTRANODAL OR SOLID ORGAN SITE: ICD-10-CM

## 2024-07-08 DIAGNOSIS — M81.0 AGE-RELATED OSTEOPOROSIS WITHOUT CURRENT PATHOLOGICAL FRACTURE: ICD-10-CM

## 2024-07-08 DIAGNOSIS — Z85.3 HISTORY OF LEFT BREAST CANCER: ICD-10-CM

## 2024-07-08 PROCEDURE — A9552 F18 FDG: HCPCS | Performed by: NURSE PRACTITIONER

## 2024-07-08 PROCEDURE — 78815 PET IMAGE W/CT SKULL-THIGH: CPT | Mod: TC

## 2024-07-08 RX ORDER — FLUDEOXYGLUCOSE F18 500 MCI/ML
10 INJECTION INTRAVENOUS
Status: COMPLETED | OUTPATIENT
Start: 2024-07-08 | End: 2024-07-08

## 2024-07-08 RX ADMIN — FLUDEOXYGLUCOSE F-18 10.4 MILLICURIE: 500 INJECTION INTRAVENOUS at 10:07

## 2024-07-10 DIAGNOSIS — Z17.0 MALIGNANT NEOPLASM OF UPPER-OUTER QUADRANT OF LEFT BREAST IN FEMALE, ESTROGEN RECEPTOR POSITIVE: Primary | ICD-10-CM

## 2024-07-10 DIAGNOSIS — C50.412 MALIGNANT NEOPLASM OF UPPER-OUTER QUADRANT OF LEFT BREAST IN FEMALE, ESTROGEN RECEPTOR POSITIVE: Primary | ICD-10-CM

## 2024-07-10 NOTE — PROGRESS NOTES
Subjective:       Patient ID: Margo Mishra is a 89 y.o. female.    Surgeon: Dr. Lazaro Prado  Primary Care: Dr. Dorian Snow     1. 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          2. Left Breast Cancer Stage IA ([id number]) diagnosed 3/30/17  Biopsy/pathology: Left breast US-guided biopsy done 3/30/17--microinvasive solid papillary carcinoma, grade 2, size cannot be determined, DCIS present, no lymphovascular invasion, ER 95.3%, OH 60.5%, Her2 1+ by IHC, Ki67 13.6%.  Surgery/pathology:   Left breast lumpectomy and SLN biopsy done 17--solid papillary carcinoma, greatest dimension of residual tumor 0.5cm, grade 2, no lymphovascular invasion, margins clear, 1 SLN negative.  Imagin. Bilateral screening MMG 16--persistent asymmetry left breast, right breast good.   2. Left breast diagnostic MMG/US 16--Left breast 3:00 sonographic hypoechoic mass, minimally increased in size from 2015. Follow-up recommended in 6 months.  3. Attempted stereotactic biopsy done 16--patient could not lay prone.  4. Unilateral MMG and Left breast US 3/16/17--slightly irregular lesion 3:00 left breast, BIRADS 4, biopsy recommended.     DEXA:  1. 17 demonstrated osteoporosis in left hip and osteopenia to AP spine and right hip.   2. 19--AP spine -1.3 (was -1.2). Left neck -2.5, Left total -1.4. Right neck -1.7, Right total -1.6. Improved in hips and slight worsening in AP spine.   3. 21--Left neck -2.6, Left total -1.7, Right neck -2.0, Right total -1.7. Left forearm -2.4. Worsening osteoporosis/osteopenia.      Procedures:  Colonoscopy 18 Dr. Higginbotham--Decreased sphincter tone found on digital rectal exam, patent end-to-side ileo-colonic anastomosis, characterized by healthy  appearing mucosa, three  4 to 6 mm polyps in the mid sigmoid colon and in the proximal transverse colon, removed with a hot snare, resected and retrieved--pathology c/w tubular adenoma.     2. 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   Biopsy/Pathology:  1. S/p CT guided biopsy of Left paraspinal mass on 19--[place] spindled cell neoplasm consistent with schwannoma (neurilemmoma).     Imagin. CT A/P with contrast 18--Wall thickening of the distal rectum could relate to a proctitis but correlation with endoscopy is recommended to exclude mass, complex ventral abdominal wall hernia containing noncompromised  small and large bowel loops, indeterminate cystic structure superior to the vaginal cuff, possibly adnexal. Suggest nonemergent outpatient pelvic ultrasound to further characterize, right renal lesion not convincingly a cyst. Outpatient ultrasound characterization suggested for this finding as well.  2. US retroperitoneum 18--A 2.3 cm hypoechoic area within the right kidney corresponds with the lesion seen by CT. It lacks definitive features of a cyst.  3. CT A/P w & w/o on 18--The right lower pole renal lesion remains indeterminate with equivocal enhancement but size is stable going back to 2018. Annual CT surveillance would probably be reasonable given stability over 5 months. The cystic lesion of the vaginal cuff is also stable.  4. Pelvic US on 18--Vaginal cuff cystic structure demonstrates no convincing solid component or vascularity. Size is similar to January.  5.MRI thoracic spine w/o contrast 19--Numerous lesions within the thoracic spine which do not appear expansile but which appear rather diffuse for intraosseous hemangiomata, contrast-enhanced study would be of value for further  characterization as I cannot exclude neoplastic deposits here, 2.4 cm well marginated left paraspinous mass. CT with and without contrast would be of value for further characterization.  6. CT  thoracic spine w/ and w/o contrast done 1/31/19--Enhancing left paraspinous mass which is nonspecific but could reflect neoplasia such as nerve sheath tumor, ganglioneuroma, or even metastatic deposit among other possibilities, altered bony mineralization which may reflect aggressive decreased bony mineralization with numerous hemangiomata within the thoracic spine, enlarged right thyroid lobe with nodularity. Thyroid sonography may b of benefit as clinically indicated.  7. CT of C/A/P on 2/11/19--2.5 cm x 1.5 cm pleural based paraspinous mass in the left posterior mediastinum at the T4 level which may be contiguous with the adjacent nerve root. Differential considerations include peripheral nerve sheath tumor and pleural-based metastasis. This may be further evaluated with PET scan if clinically indicated. 10 mm groundglass opacity in the left upper lobe. 3 mm groundglass opacity in the lateral segment of the right middle lobe. The appearance is not specific for early pulmonary metastasis but it cannot be excluded. Subcarinal cystlike structure which measures 15 mm in short axis and is favored to represent a pericardial reflection or trace pericardial fluid. Stable low-attenuation structures in the liver. Mild splenomegaly. 3 cm x 2 cm Bosniak type III cyst present in the lower pole the right kidney. The appearance confers a small but significant risk of malignancy and may be further evaluated with continued CT surveillance or ultrasonography if clinically indicated. Low-attenuation lesions in the thoracic spine demonstrate the characteristic CT appearance of hemangiomas. Stable sclerotic foci in the right sacral alar and the left pubic bone are nonspecific but could represent metastatic disease. Bone scan may be used for further evaluation if concern persists.  8. CT C/A/P 7/2/19--stable paraspinous mass, unchanged pulmonary nodules, favor benign etiology, no changed in right renal lower pole cystic lesion,  suggesting benign entity, diffuse osseous demineralization and regional degenerative changes, no acute or aggressive skeletal abnormality.  9. Screening MMG 6/27/22--focal asymmetries in both breasts need further evaluation.  10. Diagnostic bilateral MMG/US done 6/29/22--Irregular, mixed echogenicity mass with irregular margins in the 11:30 right breast, 8 cm from the nipple, is suspicious for malignancy, measures [id number].  Right breast ultrasound-guided biopsy is recommended. Oval, mixed echogenicity masses with circumscribed margins in the 11:00 right breast, 9 cm from the nipple, 1:00 left breast, 9 cm from the nipple, and 3:00 left breast, 7 cm from the nipple, are favored to represent fat necrosis and as such are considered probably benign.   11. PET/CT 9/8/22--Mild, sub threshold uptake at the right breast nodule, subtle subcutaneous nodular densities at the arm, chest and abdomen/pelvis are nonspecific, subtle associated FDG avidity.  For example at the right upper arm nodule max uptake is 3.  Appearance is nonspecific.  These lesions could be related to areas of fat necrosis.  However in a patient with history of extranodal lymphoma at the right breast, additional foci of lymphomatous disease could conceivably have this appearance, ovoid left thoracic paraspinal lesion compatible with biopsy-proven schwannoma. Per literature review benign schwannoma can have moderate FDG avidity similar to this lesion.  12. PET/CT 1/26/23--Grossly similar size and appearance of left upper thoracic paraspinal nodule, with slightly increased metabolic activity. No evidence of new hypermetabolic lesions through the neck, chest, abdomen, or pelvis. Previously described right upper outer breast nodule and additional visualized areas of scattered low level radiotracer activity are stable to slightly improved in the interval.  13. PET/CT on 7/26/23--No evidence of new or progressive hypermetabolic lymphadenopathy. Stable  hypermetabolic left paraspinal mass.      Treatment history:  1. Left breast lumpectomy 4/26/17.  2. Patient declined radiation.  3. Femara 5/15/17--stopped 8/2022.  4. External beam radiation to right breast lymphoma completed 10/18/22.  5. Prolia 11/6/17--12/2023 (stopped due to being off AI, and also completed 5 years of treatment).     Treatment plan: Observation.     Chief Complaint: Other Misc (Pt reports no concerns today.)    HPI  Patient presents for evaluation for follow-up of lymphoma. She is doing well. PET shows stable findings (SUNG) and I went over results. Patient lost her son to lung cancer in March and she is not residing in [place]. She is scheduled to undergo MMG today. No B-symptoms or other issues. Reports that her RA is controlled on MTX and she continues follow-up with Dr. Selby.     History reviewed. No pertinent past medical history.   Review of patient's allergies indicates:   Allergen Reactions    Codeine      Other reaction(s): Agitation, Confusion      Current Outpatient Medications on File Prior to Visit   Medication Sig Dispense Refill    aspirin (ECOTRIN) 81 MG EC tablet Take 81 mg by mouth once daily.      cinacalcet (SENSIPAR) 30 MG Tab       ergocalciferol (ERGOCALCIFEROL) 50,000 unit Cap Take 50,000 Units by mouth twice a week. Pt states that she takes once a month.      folic acid (FOLVITE) 1 MG tablet Take 1,000 mcg by mouth once daily.      gabapentin (NEURONTIN) 300 MG capsule Take 300 mg by mouth 3 (three) times daily.      methotrexate 2.5 MG Tab TAKE 3 TABLET BY MOUTH EVERY WEEK      PROAIR RESPICLICK 90 mcg/actuation inhaler 1 puff every 4 (four) hours as needed.      TRELEGY ELLIPTA 100-62.5-25 mcg DsDv Take 1 puff by mouth once daily.      amLODIPine (NORVASC) 2.5 MG tablet Take 2.5 mg by mouth once daily. (Patient not taking: Reported on 7/15/2024)      HYDROcodone-acetaminophen (NORCO)  mg per tablet Take 1 tablet by mouth 2 (two) times daily as needed. (Patient not  taking: Reported on 7/15/2024)      meclizine (ANTIVERT) 25 mg tablet Take 1 tablet (25 mg total) by mouth 3 (three) times daily as needed for Dizziness. 30 tablet 1    oxybutynin (DITROPAN) 5 MG Tab Take 5 mg by mouth. (Patient not taking: Reported on 7/15/2024)       No current facility-administered medications on file prior to visit.      Review of Systems   Constitutional:  Negative for appetite change, fatigue, fever and unexpected weight change.   HENT:  Negative for mouth sores.    Eyes: Negative.    Respiratory:  Negative for cough and shortness of breath.    Cardiovascular:  Negative for chest pain and leg swelling.   Gastrointestinal:  Negative for abdominal distention, abdominal pain, constipation, diarrhea, nausea, vomiting and reflux.   Genitourinary:  Negative for difficulty urinating, dysuria, frequency and hematuria.   Musculoskeletal:  Positive for arthralgias. Negative for back pain.        Joint deformities from RA, in a wheelchair   Integumentary:  Negative for rash.   Neurological:  Negative for weakness and headaches.   Hematological:  Negative for adenopathy.   Psychiatric/Behavioral:  Negative for sleep disturbance. The patient is not nervous/anxious.          Vitals:    07/15/24 1138   BP: (!) 157/73   Pulse: 91   Resp: 14   Temp: 98.3 °F (36.8 °C)       Physical Exam  Constitutional:       Appearance: Normal appearance.      Comments: Elderly white female   HENT:      Head: Normocephalic.      Nose: Nose normal.      Mouth/Throat:      Mouth: Mucous membranes are moist.   Eyes:      Extraocular Movements: Extraocular movements intact.      Conjunctiva/sclera: Conjunctivae normal.   Cardiovascular:      Rate and Rhythm: Normal rate and regular rhythm.   Pulmonary:      Effort: Pulmonary effort is normal.      Breath sounds: Normal breath sounds.   Chest:      Comments: Right breast with no palpable mass, left breast with lumpectomy incision healed well, no masses. No axillary  adenopathy  Abdominal:      General: Abdomen is protuberant. Bowel sounds are normal. There is no distension.      Palpations: Abdomen is soft.      Tenderness: There is no abdominal tenderness.      Comments: Umbilical hernia present   Musculoskeletal:      Comments: Joint deformities in hands c/w RA changes, in a wheelchair   Lymphadenopathy:      Comments: No palpable adenopathy   Skin:     General: Skin is warm.   Neurological:      General: No focal deficit present.      Mental Status: She is alert and oriented to person, place, and time.   Psychiatric:         Mood and Affect: Mood normal.         Judgment: Judgment normal.       Lab Visit on 07/08/2024   Component Date Value    Sodium 07/08/2024 144     Potassium 07/08/2024 4.3     Chloride 07/08/2024 109     CO2 07/08/2024 27     Glucose 07/08/2024 93     Blood Urea Nitrogen 07/08/2024 11.8     Creatinine 07/08/2024 0.83     Calcium 07/08/2024 8.6     Protein Total 07/08/2024 6.5     Albumin 07/08/2024 3.3 (L)     Globulin 07/08/2024 3.2     Albumin/Globulin Ratio 07/08/2024 1.0 (L)     Bilirubin Total 07/08/2024 1.2     ALP 07/08/2024 129     ALT 07/08/2024 9     AST 07/08/2024 20     eGFR 07/08/2024 >60     Anion Gap 07/08/2024 8.0     BUN/Creatinine Ratio 07/08/2024 14     Lactate Dehydrogenase 07/08/2024 224 (H)     WBC 07/08/2024 4.48 (L)     RBC 07/08/2024 4.06 (L)     Hgb 07/08/2024 12.3     Hct 07/08/2024 36.7 (L)     MCV 07/08/2024 90.4     MCH 07/08/2024 30.3     MCHC 07/08/2024 33.5     RDW 07/08/2024 13.5     Platelet 07/08/2024 208     MPV 07/08/2024 9.4     Neut % 07/08/2024 64.6     Lymph % 07/08/2024 19.4     Mono % 07/08/2024 9.4     Eos % 07/08/2024 5.1     Basophil % 07/08/2024 1.1     Lymph # 07/08/2024 0.87     Neut # 07/08/2024 2.89     Mono # 07/08/2024 0.42     Eos # 07/08/2024 0.23     Baso # 07/08/2024 0.05     IG# 07/08/2024 0.02     IG% 07/08/2024 0.4       Assessment:       1. B-cell lymphoma of extranodal or solid organ site   "  2. Malignant neoplasm of upper-outer quadrant of left breast in female, estrogen receptor positive        Plan:       Patient with breast cancer stage IA s/p lumpectomy done 4/26/17. ER/HI positive and Her 2 negative.  Tumor was small 0.5cm although may have been larger but no way to tell since there was not a measurement from the biopsy.  Per NCCN guidelines, no chemotherapy recommended due to small size of tumor.  Radiaton offered but patient refused.    On 2/19/19 she had a CT guided left paraspinal mass with results consistent with schwannoma (neurilemomma).   Recommended referral to Neurosurgery for evaluation but she refused since back pain controlled.  Repeat CT C/A/P in 7/2019 showed stable benign pulmonary nodules, no aggressive bone lesion, stable benign parapsinous mass and stable right renal cyst. No need to repeat unless any new symptoms. Outside imaging showed stable paraspinal mass.   Recommended referral to urology for surveillance for renal cyst but she refused this as well and says it has been there for "years".  She quit smoking over 30 years ago so she does not meet criteria for LDCT.     Treatment with AI alone recommended and patient started Femara on 5/15/17.  DEXA from 2017 showed osteoporosis. Patient started on Prolia.  DEXA continued to decline, most recent in 9/2021 was worse.  Recommended for patient to stop Femara in 5/2022, but she did not stop until 8/2022. Completed 5 years of Prolia in 12/2022 and stopped.   No calcium supplements due to primary hyperparathyroidism. She is taking high dose Vitamin D as she does not desire any surgery. Closely monitored by Dr. Escalera.     Patient diagnosed with right breast low-grade B-cell lymphoma, 8/3/22, mostly c/w marginal zone lymphoma.  She had no B-symptoms and no palpable adenopathy. LDH elevated, but she also has known RA.  Of note, there were also multiple other additional lesions noted on MMG/US throughout both breasts that were " possible additional sites of lymphoma. But did not show up on PET.  Due to her advanced age, she did not want any surgery so radiation recommend and she completed EBRT to right breast lymphoma on 10/18/22.  Faint monoclonal band on labs from Dr. Selby from 6/22/23. Repeat testing in 8/2023 showed no M-spike. K/L ratio and IgG elevated, suspected to be from RA.    Recent labs with normal CBC, normal CMP. LDH elevated, ut has been up and down.  PET/CT from 7/26/23 with SUNG aside from known paraspinal mass (schwannoma), unchanged.     MMG/US scheduled for today is pending.  As long as stable findings, plan to continue with annual visits with MMG only. Would not repeat PET unless any new symptoms/findings.    F/u in 1 year with repeat labs/visit.     Case previously discussed with Rheumatology Dr. Selby,and Remicade held for now. Can possibly restart at a later date if it is affecting her QOL. Okay to continue MTX.     She has h/o colon cancer vs. dysplastic polyp in cecum in 2005--colonoscopy was in 2013 Dr. Nelson, 2 polyps removed, both tubular adenomas. Not able to get pathology records from original surgery.  Most recent colonoscopy done 1/2018 showed also polyps c/w tubular adnenomas.    All questions answered.        Yenifer John MD

## 2024-07-15 ENCOUNTER — OFFICE VISIT (OUTPATIENT)
Dept: HEMATOLOGY/ONCOLOGY | Facility: CLINIC | Age: 89
End: 2024-07-15
Payer: MEDICARE

## 2024-07-15 ENCOUNTER — HOSPITAL ENCOUNTER (OUTPATIENT)
Dept: RADIOLOGY | Facility: HOSPITAL | Age: 89
Discharge: HOME | End: 2024-07-15
Attending: INTERNAL MEDICINE
Payer: MEDICARE

## 2024-07-15 VITALS
HEIGHT: 65 IN | BODY MASS INDEX: 37.38 KG/M2 | OXYGEN SATURATION: 97 % | DIASTOLIC BLOOD PRESSURE: 73 MMHG | WEIGHT: 224.38 LBS | RESPIRATION RATE: 14 BRPM | HEART RATE: 91 BPM | TEMPERATURE: 98 F | SYSTOLIC BLOOD PRESSURE: 157 MMHG

## 2024-07-15 DIAGNOSIS — C85.19 B-CELL LYMPHOMA OF EXTRANODAL OR SOLID ORGAN SITE: Primary | ICD-10-CM

## 2024-07-15 DIAGNOSIS — C50.412 MALIGNANT NEOPLASM OF UPPER-OUTER QUADRANT OF LEFT BREAST IN FEMALE, ESTROGEN RECEPTOR POSITIVE: ICD-10-CM

## 2024-07-15 DIAGNOSIS — Z17.0 MALIGNANT NEOPLASM OF UPPER-OUTER QUADRANT OF LEFT BREAST IN FEMALE, ESTROGEN RECEPTOR POSITIVE: ICD-10-CM

## 2024-07-15 PROCEDURE — 99214 OFFICE O/P EST MOD 30 MIN: CPT | Mod: S$PBB,,, | Performed by: INTERNAL MEDICINE

## 2024-07-15 PROCEDURE — 76642 ULTRASOUND BREAST LIMITED: CPT | Mod: TC,50

## 2024-07-15 PROCEDURE — 76642 ULTRASOUND BREAST LIMITED: CPT | Mod: 26,50,, | Performed by: RADIOLOGY

## 2024-07-15 PROCEDURE — 99214 OFFICE O/P EST MOD 30 MIN: CPT | Mod: PBBFAC | Performed by: INTERNAL MEDICINE

## 2024-07-15 PROCEDURE — 77062 BREAST TOMOSYNTHESIS BI: CPT | Mod: 26,,, | Performed by: RADIOLOGY

## 2024-07-15 PROCEDURE — 77066 DX MAMMO INCL CAD BI: CPT | Mod: 26,,, | Performed by: RADIOLOGY

## 2024-07-15 PROCEDURE — 77062 BREAST TOMOSYNTHESIS BI: CPT | Mod: TC

## 2024-07-15 PROCEDURE — 99999 PR PBB SHADOW E&M-EST. PATIENT-LVL IV: CPT | Mod: PBBFAC,,, | Performed by: INTERNAL MEDICINE

## 2024-07-19 DIAGNOSIS — C50.412 MALIGNANT NEOPLASM OF UPPER-OUTER QUADRANT OF LEFT BREAST IN FEMALE, ESTROGEN RECEPTOR POSITIVE: Primary | ICD-10-CM

## 2024-07-19 DIAGNOSIS — Z17.0 MALIGNANT NEOPLASM OF UPPER-OUTER QUADRANT OF LEFT BREAST IN FEMALE, ESTROGEN RECEPTOR POSITIVE: Primary | ICD-10-CM

## 2024-08-01 ENCOUNTER — LAB REQUISITION (OUTPATIENT)
Dept: LAB | Facility: HOSPITAL | Age: 89
End: 2024-08-01
Payer: MEDICARE

## 2024-08-01 DIAGNOSIS — D64.9 ANEMIA, UNSPECIFIED: ICD-10-CM

## 2024-08-01 LAB
25(OH)D3+25(OH)D2 SERPL-MCNC: 29 NG/ML (ref 30–80)
ALBUMIN SERPL-MCNC: 2.9 G/DL (ref 3.4–4.8)
ALBUMIN/GLOB SERPL: 1 RATIO (ref 1.1–2)
ALP SERPL-CCNC: 91 UNIT/L (ref 40–150)
ALT SERPL-CCNC: 7 UNIT/L (ref 0–55)
ANION GAP SERPL CALC-SCNC: 9 MEQ/L
AST SERPL-CCNC: 20 UNIT/L (ref 5–34)
BASOPHILS # BLD AUTO: 0.03 X10(3)/MCL
BASOPHILS NFR BLD AUTO: 0.9 %
BILIRUB SERPL-MCNC: 1.9 MG/DL
BUN SERPL-MCNC: 10.6 MG/DL (ref 9.8–20.1)
CALCIUM SERPL-MCNC: 8.2 MG/DL (ref 8.4–10.2)
CHLORIDE SERPL-SCNC: 109 MMOL/L (ref 98–111)
CHOLEST SERPL-MCNC: 107 MG/DL
CHOLEST/HDLC SERPL: 3 {RATIO} (ref 0–5)
CO2 SERPL-SCNC: 26 MMOL/L (ref 23–31)
CREAT SERPL-MCNC: 0.86 MG/DL (ref 0.55–1.02)
CREAT/UREA NIT SERPL: 12
EOSINOPHIL # BLD AUTO: 0.19 X10(3)/MCL (ref 0–0.9)
EOSINOPHIL NFR BLD AUTO: 5.9 %
ERYTHROCYTE [DISTWIDTH] IN BLOOD BY AUTOMATED COUNT: 14.3 % (ref 11.5–17)
GFR SERPLBLD CREATININE-BSD FMLA CKD-EPI: >60 ML/MIN/1.73/M2
GLOBULIN SER-MCNC: 2.8 GM/DL (ref 2.4–3.5)
GLUCOSE SERPL-MCNC: 93 MG/DL (ref 75–121)
HCT VFR BLD AUTO: 32.7 % (ref 37–47)
HDLC SERPL-MCNC: 40 MG/DL (ref 35–60)
HGB BLD-MCNC: 10.8 G/DL (ref 12–16)
IMM GRANULOCYTES # BLD AUTO: 0.01 X10(3)/MCL (ref 0–0.04)
IMM GRANULOCYTES NFR BLD AUTO: 0.3 %
LDLC SERPL CALC-MCNC: 51 MG/DL (ref 50–140)
LYMPHOCYTES # BLD AUTO: 0.72 X10(3)/MCL (ref 0.6–4.6)
LYMPHOCYTES NFR BLD AUTO: 22.5 %
MCH RBC QN AUTO: 30.3 PG (ref 27–31)
MCHC RBC AUTO-ENTMCNC: 33 G/DL (ref 33–36)
MCV RBC AUTO: 91.6 FL (ref 80–94)
MONOCYTES # BLD AUTO: 0.49 X10(3)/MCL (ref 0.1–1.3)
MONOCYTES NFR BLD AUTO: 15.3 %
NEUTROPHILS # BLD AUTO: 1.76 X10(3)/MCL (ref 2.1–9.2)
NEUTROPHILS NFR BLD AUTO: 55.1 %
NRBC BLD AUTO-RTO: 0 %
PLATELET # BLD AUTO: 158 X10(3)/MCL (ref 130–400)
PMV BLD AUTO: 10.1 FL (ref 7.4–10.4)
POTASSIUM SERPL-SCNC: 3.8 MMOL/L (ref 3.5–5.1)
PROT SERPL-MCNC: 5.7 GM/DL (ref 5.8–7.6)
RBC # BLD AUTO: 3.57 X10(6)/MCL (ref 4.2–5.4)
SODIUM SERPL-SCNC: 144 MMOL/L (ref 136–145)
TRIGL SERPL-MCNC: 80 MG/DL (ref 37–140)
TSH SERPL-ACNC: 2.41 UIU/ML (ref 0.35–4.94)
VLDLC SERPL CALC-MCNC: 16 MG/DL
WBC # BLD AUTO: 3.2 X10(3)/MCL (ref 4.5–11.5)

## 2024-08-01 PROCEDURE — 84443 ASSAY THYROID STIM HORMONE: CPT | Performed by: FAMILY MEDICINE

## 2024-08-01 PROCEDURE — 82306 VITAMIN D 25 HYDROXY: CPT | Performed by: FAMILY MEDICINE

## 2024-08-01 PROCEDURE — 80061 LIPID PANEL: CPT | Performed by: FAMILY MEDICINE

## 2024-08-01 PROCEDURE — 80053 COMPREHEN METABOLIC PANEL: CPT | Performed by: FAMILY MEDICINE

## 2024-08-01 PROCEDURE — 85025 COMPLETE CBC W/AUTO DIFF WBC: CPT | Performed by: FAMILY MEDICINE

## 2024-09-04 ENCOUNTER — LAB REQUISITION (OUTPATIENT)
Dept: LAB | Facility: HOSPITAL | Age: 89
End: 2024-09-04
Payer: MEDICARE

## 2024-09-04 DIAGNOSIS — E21.3 HYPERPARATHYROIDISM, UNSPECIFIED: ICD-10-CM

## 2024-09-04 LAB — PTH-INTACT SERPL-MCNC: 54 PG/ML (ref 8.7–77)

## 2024-09-04 PROCEDURE — 83970 ASSAY OF PARATHORMONE: CPT | Performed by: FAMILY MEDICINE

## 2024-11-06 ENCOUNTER — LAB REQUISITION (OUTPATIENT)
Dept: LAB | Facility: HOSPITAL | Age: 89
End: 2024-11-06
Payer: MEDICARE

## 2024-11-06 DIAGNOSIS — E55.9 VITAMIN D DEFICIENCY, UNSPECIFIED: ICD-10-CM

## 2024-11-06 LAB — 25(OH)D3+25(OH)D2 SERPL-MCNC: 27 NG/ML (ref 30–80)

## 2024-11-06 PROCEDURE — 82306 VITAMIN D 25 HYDROXY: CPT | Performed by: FAMILY MEDICINE

## 2025-02-04 ENCOUNTER — LAB REQUISITION (OUTPATIENT)
Dept: LAB | Facility: HOSPITAL | Age: OVER 89
End: 2025-02-04
Payer: MEDICARE

## 2025-02-04 DIAGNOSIS — D64.9 ANEMIA, UNSPECIFIED: ICD-10-CM

## 2025-02-04 LAB
25(OH)D3+25(OH)D2 SERPL-MCNC: 29 NG/ML (ref 30–80)
ALBUMIN SERPL-MCNC: 3.2 G/DL (ref 3.4–4.8)
ALBUMIN/GLOB SERPL: 1.1 RATIO (ref 1.1–2)
ALP SERPL-CCNC: 97 UNIT/L (ref 40–150)
ALT SERPL-CCNC: 10 UNIT/L (ref 0–55)
ANION GAP SERPL CALC-SCNC: 9 MEQ/L
AST SERPL-CCNC: 19 UNIT/L (ref 5–34)
BASOPHILS # BLD AUTO: 0.05 X10(3)/MCL
BASOPHILS NFR BLD AUTO: 0.9 %
BILIRUB SERPL-MCNC: 0.9 MG/DL
BUN SERPL-MCNC: 14.9 MG/DL (ref 9.8–20.1)
CALCIUM SERPL-MCNC: 7.9 MG/DL (ref 8.4–10.2)
CHLORIDE SERPL-SCNC: 111 MMOL/L (ref 98–111)
CHOLEST SERPL-MCNC: 119 MG/DL
CHOLEST/HDLC SERPL: 2 {RATIO} (ref 0–5)
CO2 SERPL-SCNC: 26 MMOL/L (ref 23–31)
CREAT SERPL-MCNC: 0.85 MG/DL (ref 0.55–1.02)
CREAT/UREA NIT SERPL: 18
EOSINOPHIL # BLD AUTO: 0.34 X10(3)/MCL (ref 0–0.9)
EOSINOPHIL NFR BLD AUTO: 5.9 %
ERYTHROCYTE [DISTWIDTH] IN BLOOD BY AUTOMATED COUNT: 13.8 % (ref 11.5–17)
GFR SERPLBLD CREATININE-BSD FMLA CKD-EPI: >60 ML/MIN/1.73/M2
GLOBULIN SER-MCNC: 2.9 GM/DL (ref 2.4–3.5)
GLUCOSE SERPL-MCNC: 82 MG/DL (ref 75–121)
HCT VFR BLD AUTO: 38.1 % (ref 37–47)
HDLC SERPL-MCNC: 54 MG/DL (ref 35–60)
HGB BLD-MCNC: 12.5 G/DL (ref 12–16)
IMM GRANULOCYTES # BLD AUTO: 0.04 X10(3)/MCL (ref 0–0.04)
IMM GRANULOCYTES NFR BLD AUTO: 0.7 %
LDLC SERPL CALC-MCNC: 53 MG/DL (ref 50–140)
LYMPHOCYTES # BLD AUTO: 1.43 X10(3)/MCL (ref 0.6–4.6)
LYMPHOCYTES NFR BLD AUTO: 25 %
MCH RBC QN AUTO: 30.6 PG (ref 27–31)
MCHC RBC AUTO-ENTMCNC: 32.8 G/DL (ref 33–36)
MCV RBC AUTO: 93.4 FL (ref 80–94)
MONOCYTES # BLD AUTO: 0.47 X10(3)/MCL (ref 0.1–1.3)
MONOCYTES NFR BLD AUTO: 8.2 %
NEUTROPHILS # BLD AUTO: 3.4 X10(3)/MCL (ref 2.1–9.2)
NEUTROPHILS NFR BLD AUTO: 59.3 %
NRBC BLD AUTO-RTO: 0 %
PLATELET # BLD AUTO: 203 X10(3)/MCL (ref 130–400)
PMV BLD AUTO: 10.3 FL (ref 7.4–10.4)
POTASSIUM SERPL-SCNC: 4.1 MMOL/L (ref 3.5–5.1)
PROT SERPL-MCNC: 6.1 GM/DL (ref 5.8–7.6)
RBC # BLD AUTO: 4.08 X10(6)/MCL (ref 4.2–5.4)
SODIUM SERPL-SCNC: 146 MMOL/L (ref 136–145)
TRIGL SERPL-MCNC: 59 MG/DL (ref 37–140)
TSH SERPL-ACNC: 3.91 UIU/ML (ref 0.35–4.94)
VLDLC SERPL CALC-MCNC: 12 MG/DL
WBC # BLD AUTO: 5.73 X10(3)/MCL (ref 4.5–11.5)

## 2025-02-04 PROCEDURE — 85025 COMPLETE CBC W/AUTO DIFF WBC: CPT | Performed by: FAMILY MEDICINE

## 2025-02-04 PROCEDURE — 82306 VITAMIN D 25 HYDROXY: CPT | Performed by: FAMILY MEDICINE

## 2025-02-04 PROCEDURE — 80061 LIPID PANEL: CPT | Performed by: FAMILY MEDICINE

## 2025-02-04 PROCEDURE — 84443 ASSAY THYROID STIM HORMONE: CPT | Performed by: FAMILY MEDICINE

## 2025-02-04 PROCEDURE — 80053 COMPREHEN METABOLIC PANEL: CPT | Performed by: FAMILY MEDICINE

## 2025-03-05 ENCOUNTER — LAB REQUISITION (OUTPATIENT)
Dept: LAB | Facility: HOSPITAL | Age: OVER 89
End: 2025-03-05
Payer: MEDICARE

## 2025-03-05 DIAGNOSIS — E20.0 IDIOPATHIC HYPOPARATHYROIDISM: ICD-10-CM

## 2025-03-05 LAB — PTH-INTACT SERPL-MCNC: 51 PG/ML (ref 8.7–77)

## 2025-03-05 PROCEDURE — 83970 ASSAY OF PARATHORMONE: CPT | Performed by: FAMILY MEDICINE

## 2025-05-07 ENCOUNTER — HOSPITAL ENCOUNTER (INPATIENT)
Facility: HOSPITAL | Age: OVER 89
LOS: 2 days | Discharge: HOME | DRG: 193 | End: 2025-05-09
Attending: STUDENT IN AN ORGANIZED HEALTH CARE EDUCATION/TRAINING PROGRAM | Admitting: STUDENT IN AN ORGANIZED HEALTH CARE EDUCATION/TRAINING PROGRAM
Payer: MEDICARE

## 2025-05-07 DIAGNOSIS — R06.02 SHORTNESS OF BREATH: ICD-10-CM

## 2025-05-07 DIAGNOSIS — I48.91 NEW ONSET A-FIB: ICD-10-CM

## 2025-05-07 DIAGNOSIS — J18.9 PNEUMONIA OF RIGHT MIDDLE LOBE DUE TO INFECTIOUS ORGANISM: Primary | ICD-10-CM

## 2025-05-07 DIAGNOSIS — R07.9 CHEST PAIN: ICD-10-CM

## 2025-05-07 LAB
ALBUMIN SERPL-MCNC: 2.9 G/DL (ref 3.4–4.8)
ALBUMIN/GLOB SERPL: 0.6 RATIO (ref 1.1–2)
ALP SERPL-CCNC: 120 UNIT/L (ref 40–150)
ALT SERPL-CCNC: 10 UNIT/L (ref 0–55)
ANION GAP SERPL CALC-SCNC: 15 MEQ/L
AST SERPL-CCNC: 23 UNIT/L (ref 11–45)
B PERT.PT PRMT NPH QL NAA+NON-PROBE: NOT DETECTED
BASOPHILS # BLD AUTO: 0.04 X10(3)/MCL
BASOPHILS NFR BLD AUTO: 0.4 %
BILIRUB SERPL-MCNC: 2.3 MG/DL
BUN SERPL-MCNC: 20.5 MG/DL (ref 9.8–20.1)
C PNEUM DNA NPH QL NAA+NON-PROBE: NOT DETECTED
CALCIUM SERPL-MCNC: 8.1 MG/DL (ref 8.4–10.2)
CHLORIDE SERPL-SCNC: 100 MMOL/L (ref 98–111)
CO2 SERPL-SCNC: 20 MMOL/L (ref 23–31)
CREAT SERPL-MCNC: 1.14 MG/DL (ref 0.55–1.02)
CREAT/UREA NIT SERPL: 18
EOSINOPHIL # BLD AUTO: 0.08 X10(3)/MCL (ref 0–0.9)
EOSINOPHIL NFR BLD AUTO: 0.7 %
ERYTHROCYTE [DISTWIDTH] IN BLOOD BY AUTOMATED COUNT: 13.4 % (ref 11.5–17)
FLUAV AG UPPER RESP QL IA.RAPID: NOT DETECTED
FLUBV AG UPPER RESP QL IA.RAPID: NOT DETECTED
GFR SERPLBLD CREATININE-BSD FMLA CKD-EPI: 45 ML/MIN/1.73/M2
GLOBULIN SER-MCNC: 4.6 GM/DL (ref 2.4–3.5)
GLUCOSE SERPL-MCNC: 123 MG/DL (ref 75–121)
HADV DNA NPH QL NAA+NON-PROBE: NOT DETECTED
HCOV 229E RNA NPH QL NAA+NON-PROBE: NOT DETECTED
HCOV HKU1 RNA NPH QL NAA+NON-PROBE: NOT DETECTED
HCOV NL63 RNA NPH QL NAA+NON-PROBE: NOT DETECTED
HCOV OC43 RNA NPH QL NAA+NON-PROBE: NOT DETECTED
HCT VFR BLD AUTO: 37.4 % (ref 37–47)
HGB BLD-MCNC: 12.7 G/DL (ref 12–16)
HMPV RNA NPH QL NAA+NON-PROBE: NOT DETECTED
HPIV1 RNA NPH QL NAA+NON-PROBE: NOT DETECTED
HPIV2 RNA NPH QL NAA+NON-PROBE: NOT DETECTED
HPIV3 RNA NPH QL NAA+NON-PROBE: NOT DETECTED
HPIV4 RNA NPH QL NAA+NON-PROBE: NOT DETECTED
IMM GRANULOCYTES # BLD AUTO: 0.06 X10(3)/MCL (ref 0–0.04)
IMM GRANULOCYTES NFR BLD AUTO: 0.6 %
LACTATE SERPL-SCNC: 1.2 MMOL/L (ref 0.5–2.2)
LACTATE SERPL-SCNC: 1.8 MMOL/L (ref 0.5–2.2)
LYMPHOCYTES # BLD AUTO: 0.33 X10(3)/MCL (ref 0.6–4.6)
LYMPHOCYTES NFR BLD AUTO: 3.1 %
M PNEUMO DNA NPH QL NAA+NON-PROBE: NOT DETECTED
MCH RBC QN AUTO: 30.5 PG (ref 27–31)
MCHC RBC AUTO-ENTMCNC: 34 G/DL (ref 33–36)
MCV RBC AUTO: 89.7 FL (ref 80–94)
MONOCYTES # BLD AUTO: 0.95 X10(3)/MCL (ref 0.1–1.3)
MONOCYTES NFR BLD AUTO: 8.8 %
MRSA PCR SCRN (OHS): NOT DETECTED
NEUTROPHILS # BLD AUTO: 9.32 X10(3)/MCL (ref 2.1–9.2)
NEUTROPHILS NFR BLD AUTO: 86.4 %
NRBC BLD AUTO-RTO: 0 %
PLATELET # BLD AUTO: 201 X10(3)/MCL (ref 130–400)
PMV BLD AUTO: 9.3 FL (ref 7.4–10.4)
POTASSIUM SERPL-SCNC: 3.4 MMOL/L (ref 3.5–5.1)
PROT SERPL-MCNC: 7.5 GM/DL (ref 5.8–7.6)
RBC # BLD AUTO: 4.17 X10(6)/MCL (ref 4.2–5.4)
RSV RNA NPH QL NAA+NON-PROBE: NOT DETECTED
RV+EV RNA NPH QL NAA+NON-PROBE: NOT DETECTED
SARS-COV-2 RNA RESP QL NAA+PROBE: NOT DETECTED
SODIUM SERPL-SCNC: 135 MMOL/L (ref 136–145)
TROPONIN I SERPL-MCNC: 0.05 NG/ML (ref 0–0.04)
WBC # BLD AUTO: 10.78 X10(3)/MCL (ref 4.5–11.5)

## 2025-05-07 PROCEDURE — 84484 ASSAY OF TROPONIN QUANT: CPT

## 2025-05-07 PROCEDURE — 83605 ASSAY OF LACTIC ACID: CPT | Performed by: STUDENT IN AN ORGANIZED HEALTH CARE EDUCATION/TRAINING PROGRAM

## 2025-05-07 PROCEDURE — 87581 M.PNEUMON DNA AMP PROBE: CPT | Performed by: STUDENT IN AN ORGANIZED HEALTH CARE EDUCATION/TRAINING PROGRAM

## 2025-05-07 PROCEDURE — 0240U COVID/FLU A&B PCR: CPT | Performed by: STUDENT IN AN ORGANIZED HEALTH CARE EDUCATION/TRAINING PROGRAM

## 2025-05-07 PROCEDURE — 87641 MR-STAPH DNA AMP PROBE: CPT

## 2025-05-07 PROCEDURE — 94640 AIRWAY INHALATION TREATMENT: CPT

## 2025-05-07 PROCEDURE — 11000001 HC ACUTE MED/SURG PRIVATE ROOM

## 2025-05-07 PROCEDURE — 87040 BLOOD CULTURE FOR BACTERIA: CPT | Performed by: STUDENT IN AN ORGANIZED HEALTH CARE EDUCATION/TRAINING PROGRAM

## 2025-05-07 PROCEDURE — 99285 EMERGENCY DEPT VISIT HI MDM: CPT | Mod: 25

## 2025-05-07 PROCEDURE — 93010 ELECTROCARDIOGRAM REPORT: CPT | Mod: ,,, | Performed by: INTERNAL MEDICINE

## 2025-05-07 PROCEDURE — 27000221 HC OXYGEN, UP TO 24 HOURS

## 2025-05-07 PROCEDURE — 25000003 PHARM REV CODE 250: Performed by: STUDENT IN AN ORGANIZED HEALTH CARE EDUCATION/TRAINING PROGRAM

## 2025-05-07 PROCEDURE — 80053 COMPREHEN METABOLIC PANEL: CPT | Performed by: NURSE PRACTITIONER

## 2025-05-07 PROCEDURE — 25000242 PHARM REV CODE 250 ALT 637 W/ HCPCS: Performed by: STUDENT IN AN ORGANIZED HEALTH CARE EDUCATION/TRAINING PROGRAM

## 2025-05-07 PROCEDURE — 63600175 PHARM REV CODE 636 W HCPCS: Performed by: STUDENT IN AN ORGANIZED HEALTH CARE EDUCATION/TRAINING PROGRAM

## 2025-05-07 PROCEDURE — 21400001 HC TELEMETRY ROOM

## 2025-05-07 PROCEDURE — 93005 ELECTROCARDIOGRAM TRACING: CPT

## 2025-05-07 PROCEDURE — 94760 N-INVAS EAR/PLS OXIMETRY 1: CPT

## 2025-05-07 PROCEDURE — 83605 ASSAY OF LACTIC ACID: CPT | Performed by: NURSE PRACTITIONER

## 2025-05-07 PROCEDURE — 85025 COMPLETE CBC W/AUTO DIFF WBC: CPT | Performed by: NURSE PRACTITIONER

## 2025-05-07 PROCEDURE — 99900035 HC TECH TIME PER 15 MIN (STAT)

## 2025-05-07 PROCEDURE — 25500020 PHARM REV CODE 255: Performed by: STUDENT IN AN ORGANIZED HEALTH CARE EDUCATION/TRAINING PROGRAM

## 2025-05-07 PROCEDURE — 99900031 HC PATIENT EDUCATION (STAT)

## 2025-05-07 RX ORDER — GLUCAGON 1 MG
1 KIT INJECTION
Status: DISCONTINUED | OUTPATIENT
Start: 2025-05-07 | End: 2025-05-09 | Stop reason: HOSPADM

## 2025-05-07 RX ORDER — TALC
6 POWDER (GRAM) TOPICAL NIGHTLY PRN
Status: DISCONTINUED | OUTPATIENT
Start: 2025-05-07 | End: 2025-05-09 | Stop reason: HOSPADM

## 2025-05-07 RX ORDER — BISACODYL 10 MG/1
10 SUPPOSITORY RECTAL DAILY PRN
Status: DISCONTINUED | OUTPATIENT
Start: 2025-05-07 | End: 2025-05-09 | Stop reason: HOSPADM

## 2025-05-07 RX ORDER — ALUMINUM HYDROXIDE, MAGNESIUM HYDROXIDE, AND SIMETHICONE 1200; 120; 1200 MG/30ML; MG/30ML; MG/30ML
30 SUSPENSION ORAL 4 TIMES DAILY PRN
Status: DISCONTINUED | OUTPATIENT
Start: 2025-05-07 | End: 2025-05-09 | Stop reason: HOSPADM

## 2025-05-07 RX ORDER — LEVALBUTEROL INHALATION SOLUTION 1.25 MG/3ML
1.25 SOLUTION RESPIRATORY (INHALATION)
Status: COMPLETED | OUTPATIENT
Start: 2025-05-07 | End: 2025-05-07

## 2025-05-07 RX ORDER — POLYETHYLENE GLYCOL 3350 17 G/17G
17 POWDER, FOR SOLUTION ORAL 2 TIMES DAILY PRN
Status: DISCONTINUED | OUTPATIENT
Start: 2025-05-07 | End: 2025-05-09 | Stop reason: HOSPADM

## 2025-05-07 RX ORDER — IPRATROPIUM BROMIDE AND ALBUTEROL SULFATE 2.5; .5 MG/3ML; MG/3ML
3 SOLUTION RESPIRATORY (INHALATION) EVERY 4 HOURS PRN
Status: DISCONTINUED | OUTPATIENT
Start: 2025-05-07 | End: 2025-05-07

## 2025-05-07 RX ORDER — ACETAMINOPHEN 325 MG/1
650 TABLET ORAL EVERY 4 HOURS PRN
Status: DISCONTINUED | OUTPATIENT
Start: 2025-05-07 | End: 2025-05-09 | Stop reason: HOSPADM

## 2025-05-07 RX ORDER — IBUPROFEN 200 MG
24 TABLET ORAL
Status: DISCONTINUED | OUTPATIENT
Start: 2025-05-07 | End: 2025-05-09 | Stop reason: HOSPADM

## 2025-05-07 RX ORDER — SODIUM CHLORIDE 0.9 % (FLUSH) 0.9 %
10 SYRINGE (ML) INJECTION
Status: DISCONTINUED | OUTPATIENT
Start: 2025-05-07 | End: 2025-05-09 | Stop reason: HOSPADM

## 2025-05-07 RX ORDER — LEVALBUTEROL INHALATION SOLUTION 1.25 MG/3ML
1.25 SOLUTION RESPIRATORY (INHALATION) EVERY 4 HOURS PRN
Status: DISCONTINUED | OUTPATIENT
Start: 2025-05-07 | End: 2025-05-09 | Stop reason: HOSPADM

## 2025-05-07 RX ORDER — CEFTRIAXONE 2 G/1
2 INJECTION, POWDER, FOR SOLUTION INTRAMUSCULAR; INTRAVENOUS
Status: DISCONTINUED | OUTPATIENT
Start: 2025-05-08 | End: 2025-05-09 | Stop reason: HOSPADM

## 2025-05-07 RX ORDER — IBUPROFEN 200 MG
16 TABLET ORAL
Status: DISCONTINUED | OUTPATIENT
Start: 2025-05-07 | End: 2025-05-09 | Stop reason: HOSPADM

## 2025-05-07 RX ORDER — CEFTRIAXONE 2 G/1
2 INJECTION, POWDER, FOR SOLUTION INTRAMUSCULAR; INTRAVENOUS
Status: COMPLETED | OUTPATIENT
Start: 2025-05-07 | End: 2025-05-07

## 2025-05-07 RX ADMIN — LEVALBUTEROL HYDROCHLORIDE 1.25 MG: 1.25 SOLUTION RESPIRATORY (INHALATION) at 07:05

## 2025-05-07 RX ADMIN — AZITHROMYCIN MONOHYDRATE 500 MG: 500 INJECTION, POWDER, LYOPHILIZED, FOR SOLUTION INTRAVENOUS at 09:05

## 2025-05-07 RX ADMIN — CEFTRIAXONE SODIUM 2 G: 2 INJECTION, POWDER, FOR SOLUTION INTRAMUSCULAR; INTRAVENOUS at 09:05

## 2025-05-07 RX ADMIN — IOHEXOL 100 ML: 350 INJECTION, SOLUTION INTRAVENOUS at 07:05

## 2025-05-07 NOTE — ED PROVIDER NOTES
Encounter Date: 5/7/2025       History     Chief Complaint   Patient presents with    Fall     Pt to ED via [facility]. Pt had unwitnessed of fall while in the shower. Pt endorses L sided pain which was present prior to fall. -headstrike -LOC GCS 15     See MDM    The history is provided by the patient. No  was used.     Review of patient's allergies indicates:   Allergen Reactions    Codeine      Other reaction(s): Agitation, Confusion     Past Medical History:   Diagnosis Date    Hypertension      Past Surgical History:   Procedure Laterality Date    HYSTERECTOMY      comp hyst at age 38    OOPHORECTOMY      comp hyst at age 38     No family history on file.  Social History[1]  Review of Systems   Constitutional:  Negative for fever.   Respiratory:  Positive for shortness of breath and wheezing. Negative for cough.    Cardiovascular:  Positive for chest pain.   Gastrointestinal:  Negative for abdominal pain.   Genitourinary:  Negative for difficulty urinating and dysuria.   Musculoskeletal:  Negative for gait problem.   Skin:  Negative for color change.   Neurological:  Negative for dizziness, speech difficulty and headaches.   Psychiatric/Behavioral:  Negative for hallucinations and suicidal ideas.    All other systems reviewed and are negative.      Physical Exam     Initial Vitals [05/07/25 1736]   BP Pulse Resp Temp SpO2   (!) 122/59 62 18 98.6 °F (37 °C) 95 %      MAP       --         Physical Exam    Nursing note and vitals reviewed.  Constitutional: She appears well-developed and well-nourished.   HENT:   Head: Normocephalic.   Eyes: EOM are normal.   Neck:   Normal range of motion.  Cardiovascular:  Normal rate, regular rhythm, normal heart sounds and intact distal pulses.           Pulmonary/Chest: Breath sounds normal. No respiratory distress. She exhibits tenderness.   Abdominal: Abdomen is soft. Bowel sounds are normal. There is no abdominal tenderness.   Musculoskeletal:         General:  Normal range of motion.      Cervical back: Normal range of motion.     Neurological: She is alert and oriented to person, place, and time. She has normal strength.   Skin: Skin is warm and dry.   Psychiatric: She has a normal mood and affect. Her behavior is normal. Judgment and thought content normal.         ED Course   Procedures  Labs Reviewed   COMPREHENSIVE METABOLIC PANEL - Abnormal       Result Value    Sodium 135 (*)     Potassium 3.4 (*)     Chloride 100      CO2 20 (*)     Glucose 123 (*)     Blood Urea Nitrogen 20.5 (*)     Creatinine 1.14 (*)     Calcium 8.1 (*)     Protein Total 7.5      Albumin 2.9 (*)     Globulin 4.6 (*)     Albumin/Globulin Ratio 0.6 (*)     Bilirubin Total 2.3 (*)           ALT 10      AST 23      eGFR 45      Anion Gap 15.0      BUN/Creatinine Ratio 18     CBC WITH DIFFERENTIAL - Abnormal    WBC 10.78      RBC 4.17 (*)     Hgb 12.7      Hct 37.4      MCV 89.7      MCH 30.5      MCHC 34.0      RDW 13.4      Platelet 201      MPV 9.3      Neut % 86.4      Lymph % 3.1      Mono % 8.8      Eos % 0.7      Basophil % 0.4      Imm Grans % 0.6      Neut # 9.32 (*)     Lymph # 0.33 (*)     Mono # 0.95      Eos # 0.08      Baso # 0.04      Imm Gran # 0.06 (*)     NRBC% 0.0     LACTIC ACID, PLASMA - Normal    Lactic Acid Level 1.8     COVID/FLU A&B PCR - Normal    Influenza A PCR Not Detected      Influenza B PCR Not Detected      SARS-CoV-2 PCR Not Detected      Narrative:     The Xpert Xpress SARS-CoV-2/FLU/RSV plus is a rapid, multiplexed real-time PCR test intended for the simultaneous qualitative detection and differentiation of SARS-CoV-2, Influenza A, Influenza B, and respiratory syncytial virus (RSV) viral RNA in either nasopharyngeal swab or nasal swab specimens.         RESPIRATORY PANEL - Normal    Adenovirus Not Detected      Coronavirus 229E Not Detected      Coronavirus HKU1 Not Detected      Coronavirus NL63 Not Detected      Coronavirus OC43 PCR, Common Cold Virus  Not Detected      Human Metapneumovirus Not Detected      Parainfluenza Virus 1 Not Detected      Parainfluenza Virus 2 Not Detected      Parainfluenza Virus 3 Not Detected      Parainfluenza Virus 4 Not Detected      Bordetella pertussis (ptxP) Not Detected      Chlamydia pneumoniae Not Detected      Mycoplasma pneumoniae Not Detected      Human Rhinovirus/Enterovirus Not Detected      Bordetella parapertussis (SE4894) Not Detected      Narrative:     The BioFire Respiratory Panel 2.1 (RP2.1) is a PCR-based multiplexed nucleic acid test intended for use with the BioFire® 2.0 for simultaneous qualitative detection and identification of multiple respiratory viral and bacterial nucleic acids in nasopharyngeal swabs (NPS) obtained from individuals suspected of respiratory tract infections.   BLOOD CULTURE [facility]   BLOOD CULTURE [facility]   RESPIRATORY CULTURE ([facility])   CBC W/ AUTO DIFFERENTIAL    Narrative:     The following orders were created for panel order CBC auto differential.  Procedure                               Abnormality         Status                     ---------                               -----------         ------                     CBC with Differential[[id number]]       Abnormal            Final result                 Please view results for these tests on the individual orders.          Imaging Results              CT Chest Abdomen Pelvis With IV Contrast (XPD) NO Oral Contrast (Preliminary result)  Result time 05/07/25 19:56:54      Preliminary result by Meir Smith Jr., MD (05/07/25 19:56:54)                   Narrative:    START OF REPORT:  Technique: CT Scan of the chest abdomen and pelvis was performed with intravenous contrast with axial as well as sagittal and, coronal images.    Dosage Information: Automated Exposure Control was utilized.    Comparison: Comparison is with chest CT qnjud25- and CT abdomen dated 05-.    Clinical History: Fall, r side pain.    Findings:  Lines  and Tubes: None.  Neck: The visualized soft tissues of the neck appear unremarkable.  Mediastinum: A few subcentimeter mediastinal lymph nodes are seen.  Heart: The heart size is within normal limits. A small pericardial effusion is present.  Aorta: Mild aortic calcification is seen in the thoracic aorta.  Pulmonary Arteries: Unremarkable.  Lungs: There is consolidation in the right middle lobe and left upper lobe, likely due to pneumonia. Minimal ground glass and nodular densities measuring up to 0.7 cm are also seen in the right upper lobe. There are minimal ground glass opacities in the basal segments of the bilateral lower lobes.  Pleura: No effusions or pneumothorax are identified. There is somewhat nodular pleural thickening in the basal segments of the bilateral lower lobes.  Bony Structures:  Spine: Moderate spondylolytic changes are seen in the thoracic spine. Dextroconvex scoliosis of the thoracic spine.  Ribs: No rib fractures are identified.  Abdomen:  Abdominal Wall: There is a medium sized umbilical hernia which contains a portion of the transverse colon, without evidence of strangulation. Another large anterior wall hernia is seen in the infraumbilical area measuring 9.0 x 16.2 cm (AP x width), with herniation of some small bowel loops, without evidence of strangulation.  Liver: There are stable-appearing cysts in the left hepatic lobe, measuring up to 1.2 cm.  Biliary System: The intrahepatic and extra hepatic biliary system appears prominent which may reflect prior obstructive physiology in this patient status post cholecystectomy.  Gallbladder: Surgical clips are seen in the gallbladder fossa consistent with prior cholecystectomy.  Pancreas: Moderate pancreatic atrophy is seen.  Spleen: The spleen appears unremarkable.  Adrenals: The left adrenal gland appears prominent with a 0.8 cm hypodense nodule in the genu, probably an adenoma. The right adrenal gland is unremarkable.  Kidneys: The left  kidney appears unremarkable with no stones cysts masses or hydronephrosis. There is no change in the appearance of the lobulated cyst with few septa and tin rim calcification in the lower pole of the right kidney, presently measuring 2.6 x 1.6 cm. The right kidney is otherwise unremarkable without stones, masses or hydronephrosis.  Aorta: There is moderate calcification of the abdominal aorta and its branches.  IVC: Unremarkable.  Bowel:  Esophagus: The visualized distal esophagus appears unremarkable.  Stomach: The stomach appears unremarkable.  Duodenum: Unremarkable appearing duodenum.  Small Bowel: The small bowel appears unremarkable.  Colon: Nondistended.  Appendix: The appendix is not identified but no inflammatory changes are seen in the right lower quadrant to suggest appendicitis.    Pelvis:  Bladder: The bladder appears unremarkable.  Female:  Uterus: The uterus is not identified. There is no change in the 5.4 cm cystic structure in the pelvic fossa, probably related to previous post-procedural changes.    Bony structures:  Dorsal Spine: There is moderate spondylosis of the visualized dorsal spine. Lumbar levoscoliosis is noted.  Bony Pelvis: There are bone islands in the right sacrum and left iliac bone. Right hip osteoarthritis is noted.      Impression:  1. A small pericardial effusion is present.  2. There is consolidation in the right middle lobe and left upper lobe, likely due to pneumonia. Minimal ground glass and nodular densities measuring up to 0.7 cm are also seen in the right upper lobe. There are minimal ground glass opacities in the basal segments of the bilateral lower lobes. These findings are also likely due to an infectious process.  3. There is somewhat nodular pleural thickening in the basal segments of the bilateral lower lobes.  4. No acute traumatic intrathoracic pathology identified. No acute traumatic intraabdominal or pelvic solid organ or bowel pathology identified. Details and  findings as discussed above.                                         CT Head Without Contrast (Preliminary result)  Result time 05/07/25 19:33:36      Preliminary result by Meir Smith Jr., MD (05/07/25 19:33:36)                   Narrative:    START OF REPORT:  Technique: CT of the head was performed without intravenous contrast with axial as well as coronal and sagittal images.    Comparison: Comparison is with study [id number] 09:05:56.    Dosage Information: Automated exposure control was utilized.    Clinical history: Fall, r side pain.    Findings:  Hemorrhage: No acute intracranial hemorrhage is seen.  CSF spaces: The ventricles, sulci and basal cisterns all appear mildly prominent consistent with global cerebral atrophy. This finding is stable since the prior study.  Brain parenchyma: There is preservation of the grey white junction throughout. No acute infarct is identified. Mild microvascular change is seen in portions of the periventricular and deep white matter tracts.  Cerebellum: Unremarkable.  Vascular: Unremarkable venous sinuses. Stable appearing atheromatous calcification of the intracranial arteries is seen.  Sella and skull base: The sella appears to be within normal limits for age.  Intracranial calcifications: Incidental note is made of bilateral choroid plexus calcification. Incidental note is made of some pineal region calcification. Incidental note is made of some calcification of the falx.  Calvarium: No acute linear or depressed skull fracture is seen.    Maxillofacial Structures:  Paranasal sinuses: There is some air fluid levels in the left maxillary sinuses.  Orbits: The orbits appear unremarkable.  Temporal bones and mastoids: The temporal bones and mastoids appear unremarkable.  TMJ: The mandibular condyles appear normally placed with respect to the mandibular fossa.  Nasal Bones: The nasal septum is midline. No displaced nasal bone fracture is seen.    Visualized upper  cervical spine: The visualized cervical spine appears unremarkable.      Impression:  1. No acute intracranial traumatic injury identified. Details and other findings as noted above.                                         CT Cervical Spine Without Contrast (Preliminary result)  Result time 05/07/25 19:31:01      Preliminary result by Meir Smith Jr., MD (05/07/25 19:31:01)                   Narrative:    START OF REPORT:  Technique: CT of the cervical spine was performed without intravenous contrast with axial as well as sagittal and coronal images.    Comparison: None.    Dosage Information: Automated exposure control was utilized.    Clinical history: Fall, r side pain.    Findings:  Lung apices: The visualized lung apices appear unremarkable.  Spine:  Spinal canal: The spinal canal appears unremarkable.  Spinal cord: The spinal cord appears unremarkable.  Mineralization: Mild osteopenia is seen in the visualized bony structures of the cervical spine.  Rotation: No significant rotation is seen.  Scoliosis: No significant scoliosis is seen.  Vertebral Fusion: No vertebral fusion is identified.  Listhesis: No significant listhesis is identified.  Lordosis: Moderate straightening of the cervical lordosis is seen. This may be positional or reflect an element of myospasm.  Intervertebral disc spaces: Multilevel loss of disc height is seen.  Osteophytes: Mild multilevel endplate osteophytes are seen.  Uncovertebral degenerative changes: Mild multilevel uncovertebral joint arthrosis is seen.  Facet degenerative changes: Mild to moderate multilevel facet degenerative changes are seen.  Fractures: No acute cervical spine fracture dislocation or subluxation is seen.  Orthopedic Hardware: None.    Miscellaneous:  Mastoid air cells: The visualized mastoid air cells appear clear.  Soft Tissues: Unremarkable.      Impression:  1. No acute cervical spine fracture dislocation or subluxation is seen.  2. Degenerative  changes and other details as above.                                         Medications   sodium chloride 0.9% flush 10 mL (has no administration in time range)   melatonin tablet 6 mg (has no administration in time range)   polyethylene glycol packet 17 g (has no administration in time range)   bisacodyL suppository 10 mg (has no administration in time range)   acetaminophen tablet 650 mg (has no administration in time range)   aluminum-magnesium hydroxide-simethicone 200-200-20 mg/5 mL suspension 30 mL (has no administration in time range)   glucose chewable tablet 16 g (has no administration in time range)   glucose chewable tablet 24 g (has no administration in time range)   dextrose 50% injection 12.5 g (has no administration in time range)   dextrose 50% injection 25 g (has no administration in time range)   glucagon (human recombinant) injection 1 mg (has no administration in time range)   azithromycin (ZITHROMAX) 500 mg in 0.9% NaCl 250 mL IVPB (admixture device) (has no administration in time range)   cefTRIAXone injection 2 g (has no administration in time range)   levalbuterol nebulizer solution 1.25 mg (has no administration in time range)   iohexoL (OMNIPAQUE 350) injection 100 mL (100 mLs Intravenous Given 5/7/25 1909)   levalbuterol nebulizer solution 1.25 mg (1.25 mg Nebulization Given 5/7/25 1955)   azithromycin (ZITHROMAX) 500 mg in 0.9% NaCl 250 mL IVPB (admixture device) (0 mg Intravenous Stopped 5/7/25 2200)   cefTRIAXone injection 2 g (2 g Intravenous Given 5/7/25 2100)     Medical Decision Making  Historian:  Patient.  Patient is a White 89 y.o. female that presents with fall that has been present today. Associated symptoms shortness of breath and rib pain. Surrounding information is accidental fall. Exacerbated by nothing. Relieved by nothing. Patient treatment prior to arrival oxygen. Risk factors include none. Other history pertaining to this complaint nothing.   Assessment:  See physical  exam.  DD:  Rib fracture, intrathoracic injury, pneumonia  ED Course: History was obtained.  Physical was performed.  Patient appears to have pneumonia. Medical or surgical consults:  Hospital Medicine. Social determinants that affect healthcare:  None.       Amount and/or Complexity of Data Reviewed  Labs: ordered. Decision-making details documented in ED Course.  Radiology: ordered. Decision-making details documented in ED Course.     Details: CT shows pneumonia  Discussion of management or test interpretation with external provider(s): Discussed case with the Elizabeth, nurse practitioner with Hospital Medicine, and they accept the admission    Risk  Decision regarding hospitalization.               ED Course as of 05/07/25 2235   Wed May 07, 2025   2008 CT Head Without Contrast [CL]   2016 Sodium(!): 135 [CL]   2016 CO2(!): 20 [CL]   2016 Glucose(!): 123 [CL]   2016 BUN(!): 20.5 [CL]   2016 Creatinine(!): 1.14 [CL]   2016 Albumin(!): 2.9 [CL]   2016 BILIRUBIN TOTAL(!): 2.3 [CL]   2157 EKG AFib with RVR.  Rate of 105.  Left axis deviation.  Incomplete right bundle-branch block.  LVH.  No STEMI.  Time of 1803 [RP]      ED Course User Index  [CL] Mango Saxena FNP  [RP] Dennis Crowder MD                           Clinical Impression:  Final diagnoses:  [J18.9] Pneumonia of right middle lobe due to infectious organism (Primary)          ED Disposition Condition    Admit                   Mango Saxena FNP  05/07/25 2038       [1]   Social History  Tobacco Use    Smoking status: Former     Types: Cigarettes    Smokeless tobacco: Never   Substance Use Topics    Alcohol use: Not Currently    Drug use: Not Currently        Mango Saxena FNP  05/07/25 2235

## 2025-05-08 LAB
ALBUMIN SERPL-MCNC: 2.5 G/DL (ref 3.4–4.8)
ALBUMIN/GLOB SERPL: 0.6 RATIO (ref 1.1–2)
ALP SERPL-CCNC: 105 UNIT/L (ref 40–150)
ALT SERPL-CCNC: 10 UNIT/L (ref 0–55)
ANION GAP SERPL CALC-SCNC: 11 MEQ/L
APICAL FOUR CHAMBER EJECTION FRACTION: 57 %
APICAL TWO CHAMBER EJECTION FRACTION: 55 %
AST SERPL-CCNC: 22 UNIT/L (ref 11–45)
AV INDEX (PROSTH): 0.78
AV MEAN GRADIENT: 6 MMHG
AV PEAK GRADIENT: 12 MMHG
AV VALVE AREA BY VELOCITY RATIO: 2.9 CM²
AV VALVE AREA: 2.7 CM²
AV VELOCITY RATIO: 0.82
BASOPHILS # BLD AUTO: 0.05 X10(3)/MCL
BASOPHILS NFR BLD AUTO: 0.5 %
BILIRUB SERPL-MCNC: 1.2 MG/DL
BSA FOR ECHO PROCEDURE: 2.16 M2
BUN SERPL-MCNC: 20.5 MG/DL (ref 9.8–20.1)
CALCIUM SERPL-MCNC: 7.9 MG/DL (ref 8.4–10.2)
CHLORIDE SERPL-SCNC: 105 MMOL/L (ref 98–111)
CO2 SERPL-SCNC: 19 MMOL/L (ref 23–31)
CREAT SERPL-MCNC: 0.93 MG/DL (ref 0.55–1.02)
CREAT/UREA NIT SERPL: 22
CV ECHO LV RWT: 0.46 CM
DOP CALC AO PEAK VEL: 1.7 M/S
DOP CALC AO VTI: 29.7 CM
DOP CALC LVOT AREA: 3.5 CM2
DOP CALC LVOT DIAMETER: 2.1 CM
DOP CALC LVOT PEAK VEL: 1.4 M/S
DOP CALC LVOT STROKE VOLUME: 80.3 CM3
DOP CALC MV VTI: 23.7 CM
DOP CALCLVOT PEAK VEL VTI: 23.2 CM
E WAVE DECELERATION TIME: 230 MSEC
E/A RATIO: 0.95
E/E' RATIO: 12 M/S
ECHO LV POSTERIOR WALL: 1.3 CM (ref 0.6–1.1)
EOSINOPHIL # BLD AUTO: 0.22 X10(3)/MCL (ref 0–0.9)
EOSINOPHIL NFR BLD AUTO: 2.2 %
ERYTHROCYTE [DISTWIDTH] IN BLOOD BY AUTOMATED COUNT: 13.4 % (ref 11.5–17)
FRACTIONAL SHORTENING: 26.8 % (ref 28–44)
GFR SERPLBLD CREATININE-BSD FMLA CKD-EPI: 58 ML/MIN/1.73/M2
GLOBULIN SER-MCNC: 4.2 GM/DL (ref 2.4–3.5)
GLUCOSE SERPL-MCNC: 91 MG/DL (ref 75–121)
HCT VFR BLD AUTO: 33.6 % (ref 37–47)
HGB BLD-MCNC: 10.9 G/DL (ref 12–16)
IMM GRANULOCYTES # BLD AUTO: 0.04 X10(3)/MCL (ref 0–0.04)
IMM GRANULOCYTES NFR BLD AUTO: 0.4 %
INTERVENTRICULAR SEPTUM: 1.3 CM (ref 0.6–1.1)
LEFT ATRIUM AREA SYSTOLIC (APICAL 2 CHAMBER): 26.8 CM2
LEFT ATRIUM AREA SYSTOLIC (APICAL 4 CHAMBER): 11.9 CM2
LEFT ATRIUM SIZE: 2.9 CM
LEFT INTERNAL DIMENSION IN SYSTOLE: 4.1 CM (ref 2.1–4)
LEFT VENTRICLE DIASTOLIC VOLUME INDEX: 74.04 ML/M2
LEFT VENTRICLE DIASTOLIC VOLUME: 154 ML
LEFT VENTRICLE END DIASTOLIC VOLUME APICAL 2 CHAMBER: 56.1 ML
LEFT VENTRICLE END DIASTOLIC VOLUME APICAL 4 CHAMBER: 78.1 ML
LEFT VENTRICLE END SYSTOLIC VOLUME APICAL 2 CHAMBER: 92.9 ML
LEFT VENTRICLE END SYSTOLIC VOLUME APICAL 4 CHAMBER: 26 ML
LEFT VENTRICLE MASS INDEX: 150.6 G/M2
LEFT VENTRICLE SYSTOLIC VOLUME INDEX: 35.6 ML/M2
LEFT VENTRICLE SYSTOLIC VOLUME: 74 ML
LEFT VENTRICULAR INTERNAL DIMENSION IN DIASTOLE: 5.6 CM (ref 3.5–6)
LEFT VENTRICULAR MASS: 313.2 G
LV LATERAL E/E' RATIO: 9.8 M/S
LV SEPTAL E/E' RATIO: 16.3 M/S
LVED V (TEICH): 154 ML
LVES V (TEICH): 74.2 ML
LVOT MG: 5 MMHG
LVOT MV: 0.98 CM/S
LYMPHOCYTES # BLD AUTO: 0.8 X10(3)/MCL (ref 0.6–4.6)
LYMPHOCYTES NFR BLD AUTO: 8.1 %
MAGNESIUM SERPL-MCNC: 1.6 MG/DL (ref 1.6–2.6)
MCH RBC QN AUTO: 29.5 PG (ref 27–31)
MCHC RBC AUTO-ENTMCNC: 32.4 G/DL (ref 33–36)
MCV RBC AUTO: 90.8 FL (ref 80–94)
MONOCYTES # BLD AUTO: 1 X10(3)/MCL (ref 0.1–1.3)
MONOCYTES NFR BLD AUTO: 10.2 %
MV MEAN GRADIENT: 2 MMHG
MV PEAK A VEL: 1.03 M/S
MV PEAK E VEL: 0.98 M/S
MV PEAK GRADIENT: 4 MMHG
MV STENOSIS PRESSURE HALF TIME: 70 MS
MV VALVE AREA BY CONTINUITY EQUATION: 3.39 CM2
MV VALVE AREA P 1/2 METHOD: 3.14 CM2
NEUTROPHILS # BLD AUTO: 7.73 X10(3)/MCL (ref 2.1–9.2)
NEUTROPHILS NFR BLD AUTO: 78.6 %
NRBC BLD AUTO-RTO: 0 %
OHS LV EJECTION FRACTION SIMPSONS BIPLANE MOD: 55 %
OHS QRS DURATION: 104 MS
OHS QRS DURATION: 106 MS
OHS QTC CALCULATION: 449 MS
OHS QTC CALCULATION: 475 MS
PISA TR MAX VEL: 2 M/S
PLATELET # BLD AUTO: 219 X10(3)/MCL (ref 130–400)
PMV BLD AUTO: 9.9 FL (ref 7.4–10.4)
POTASSIUM SERPL-SCNC: 3.2 MMOL/L (ref 3.5–5.1)
PROT SERPL-MCNC: 6.7 GM/DL (ref 5.8–7.6)
PV PEAK GRADIENT: 6 MMHG
PV PEAK VELOCITY: 1.2 M/S
RA PRESSURE ESTIMATED: 3 MMHG
RBC # BLD AUTO: 3.7 X10(6)/MCL (ref 4.2–5.4)
RV TB RVSP: 5 MMHG
SODIUM SERPL-SCNC: 135 MMOL/L (ref 136–145)
TDI LATERAL: 0.1 M/S
TDI SEPTAL: 0.06 M/S
TDI: 0.08 M/S
TR MAX PG: 17 MMHG
TRICUSPID ANNULAR PLANE SYSTOLIC EXCURSION: 2.3 CM
TROPONIN I SERPL-MCNC: 0.05 NG/ML (ref 0–0.04)
TROPONIN I SERPL-MCNC: 0.05 NG/ML (ref 0–0.04)
TV REST PULMONARY ARTERY PRESSURE: 19 MMHG
WBC # BLD AUTO: 9.84 X10(3)/MCL (ref 4.5–11.5)
Z-SCORE OF LEFT VENTRICULAR DIMENSION IN END DIASTOLE: -1.29
Z-SCORE OF LEFT VENTRICULAR DIMENSION IN END SYSTOLE: 0.4

## 2025-05-08 PROCEDURE — 94640 AIRWAY INHALATION TREATMENT: CPT

## 2025-05-08 PROCEDURE — 25000242 PHARM REV CODE 250 ALT 637 W/ HCPCS: Performed by: STUDENT IN AN ORGANIZED HEALTH CARE EDUCATION/TRAINING PROGRAM

## 2025-05-08 PROCEDURE — 99900035 HC TECH TIME PER 15 MIN (STAT)

## 2025-05-08 PROCEDURE — 80053 COMPREHEN METABOLIC PANEL: CPT

## 2025-05-08 PROCEDURE — 84484 ASSAY OF TROPONIN QUANT: CPT

## 2025-05-08 PROCEDURE — 63600175 PHARM REV CODE 636 W HCPCS

## 2025-05-08 PROCEDURE — 25000003 PHARM REV CODE 250: Performed by: INTERNAL MEDICINE

## 2025-05-08 PROCEDURE — 93010 ELECTROCARDIOGRAM REPORT: CPT | Mod: ,,, | Performed by: INTERNAL MEDICINE

## 2025-05-08 PROCEDURE — 94760 N-INVAS EAR/PLS OXIMETRY 1: CPT

## 2025-05-08 PROCEDURE — 94799 UNLISTED PULMONARY SVC/PX: CPT

## 2025-05-08 PROCEDURE — 21400001 HC TELEMETRY ROOM

## 2025-05-08 PROCEDURE — 25000003 PHARM REV CODE 250: Performed by: STUDENT IN AN ORGANIZED HEALTH CARE EDUCATION/TRAINING PROGRAM

## 2025-05-08 PROCEDURE — 87070 CULTURE OTHR SPECIMN AEROBIC: CPT

## 2025-05-08 PROCEDURE — 85025 COMPLETE CBC W/AUTO DIFF WBC: CPT

## 2025-05-08 PROCEDURE — 93005 ELECTROCARDIOGRAM TRACING: CPT

## 2025-05-08 PROCEDURE — 94761 N-INVAS EAR/PLS OXIMETRY MLT: CPT

## 2025-05-08 PROCEDURE — 63600175 PHARM REV CODE 636 W HCPCS: Performed by: STUDENT IN AN ORGANIZED HEALTH CARE EDUCATION/TRAINING PROGRAM

## 2025-05-08 PROCEDURE — 25000003 PHARM REV CODE 250: Performed by: NURSE PRACTITIONER

## 2025-05-08 PROCEDURE — 36415 COLL VENOUS BLD VENIPUNCTURE: CPT

## 2025-05-08 PROCEDURE — 83735 ASSAY OF MAGNESIUM: CPT

## 2025-05-08 RX ORDER — IPRATROPIUM BROMIDE AND ALBUTEROL SULFATE 2.5; .5 MG/3ML; MG/3ML
3 SOLUTION RESPIRATORY (INHALATION) EVERY 6 HOURS
Status: DISCONTINUED | OUTPATIENT
Start: 2025-05-08 | End: 2025-05-09 | Stop reason: HOSPADM

## 2025-05-08 RX ORDER — CINACALCET 30 MG/1
30 TABLET, FILM COATED ORAL
COMMUNITY

## 2025-05-08 RX ORDER — POLYVINYL ALCOHOL 14 MG/ML
1 SOLUTION/ DROPS OPHTHALMIC 2 TIMES DAILY
COMMUNITY

## 2025-05-08 RX ORDER — FOLIC ACID 1 MG/1
1000 TABLET ORAL DAILY
Status: DISCONTINUED | OUTPATIENT
Start: 2025-05-08 | End: 2025-05-09 | Stop reason: HOSPADM

## 2025-05-08 RX ORDER — POTASSIUM CHLORIDE 20 MEQ/1
20 TABLET, EXTENDED RELEASE ORAL ONCE
Status: COMPLETED | OUTPATIENT
Start: 2025-05-08 | End: 2025-05-08

## 2025-05-08 RX ORDER — UMECLIDINIUM BROMIDE AND VILANTEROL TRIFENATATE 62.5; 25 UG/1; UG/1
1 POWDER RESPIRATORY (INHALATION) DAILY
Status: DISCONTINUED | OUTPATIENT
Start: 2025-05-08 | End: 2025-05-09 | Stop reason: HOSPADM

## 2025-05-08 RX ORDER — GABAPENTIN 600 MG/1
600 TABLET ORAL NIGHTLY
COMMUNITY

## 2025-05-08 RX ORDER — METOPROLOL SUCCINATE 25 MG/1
25 TABLET, EXTENDED RELEASE ORAL DAILY
Status: DISCONTINUED | OUTPATIENT
Start: 2025-05-08 | End: 2025-05-09 | Stop reason: HOSPADM

## 2025-05-08 RX ORDER — METOPROLOL TARTRATE 1 MG/ML
5 INJECTION, SOLUTION INTRAVENOUS ONCE
Status: COMPLETED | OUTPATIENT
Start: 2025-05-08 | End: 2025-05-08

## 2025-05-08 RX ORDER — CINACALCET 30 MG/1
30 TABLET, FILM COATED ORAL
Status: DISCONTINUED | OUTPATIENT
Start: 2025-05-09 | End: 2025-05-09 | Stop reason: HOSPADM

## 2025-05-08 RX ORDER — GABAPENTIN 100 MG/1
200 CAPSULE ORAL 2 TIMES DAILY
Status: DISCONTINUED | OUTPATIENT
Start: 2025-05-08 | End: 2025-05-09 | Stop reason: HOSPADM

## 2025-05-08 RX ORDER — ASPIRIN 81 MG/1
81 TABLET ORAL DAILY
Status: DISCONTINUED | OUTPATIENT
Start: 2025-05-08 | End: 2025-05-09 | Stop reason: HOSPADM

## 2025-05-08 RX ORDER — ACETAMINOPHEN AND PHENYLEPHRINE HCL 325; 5 MG/1; MG/1
1 TABLET ORAL DAILY
COMMUNITY

## 2025-05-08 RX ORDER — ALBUTEROL SULFATE 90 UG/1
1 INHALANT RESPIRATORY (INHALATION) EVERY 6 HOURS PRN
Status: DISCONTINUED | OUTPATIENT
Start: 2025-05-08 | End: 2025-05-09 | Stop reason: HOSPADM

## 2025-05-08 RX ADMIN — POTASSIUM CHLORIDE 20 MEQ: 1500 TABLET, EXTENDED RELEASE ORAL at 11:05

## 2025-05-08 RX ADMIN — METOPROLOL SUCCINATE 25 MG: 25 TABLET, EXTENDED RELEASE ORAL at 11:05

## 2025-05-08 RX ADMIN — GABAPENTIN 200 MG: 100 CAPSULE ORAL at 08:05

## 2025-05-08 RX ADMIN — FLUTICASONE FUROATE 1 PUFF: 100 POWDER RESPIRATORY (INHALATION) at 01:05

## 2025-05-08 RX ADMIN — CEFTRIAXONE SODIUM 2 G: 2 INJECTION, POWDER, FOR SOLUTION INTRAMUSCULAR; INTRAVENOUS at 08:05

## 2025-05-08 RX ADMIN — APIXABAN 5 MG: 5 TABLET, FILM COATED ORAL at 11:05

## 2025-05-08 RX ADMIN — IPRATROPIUM BROMIDE AND ALBUTEROL SULFATE 3 ML: .5; 3 SOLUTION RESPIRATORY (INHALATION) at 12:05

## 2025-05-08 RX ADMIN — GABAPENTIN 200 MG: 100 CAPSULE ORAL at 11:05

## 2025-05-08 RX ADMIN — UMECLIDINIUM BROMIDE AND VILANTEROL TRIFENATATE 1 PUFF: 62.5; 25 POWDER RESPIRATORY (INHALATION) at 01:05

## 2025-05-08 RX ADMIN — APIXABAN 5 MG: 5 TABLET, FILM COATED ORAL at 08:05

## 2025-05-08 RX ADMIN — METOPROLOL TARTRATE 5 MG: 1 INJECTION, SOLUTION INTRAVENOUS at 02:05

## 2025-05-08 RX ADMIN — IPRATROPIUM BROMIDE AND ALBUTEROL SULFATE 3 ML: .5; 3 SOLUTION RESPIRATORY (INHALATION) at 08:05

## 2025-05-08 RX ADMIN — FOLIC ACID 1000 MCG: 1 TABLET ORAL at 11:05

## 2025-05-08 RX ADMIN — AZITHROMYCIN MONOHYDRATE 500 MG: 500 INJECTION, POWDER, LYOPHILIZED, FOR SOLUTION INTRAVENOUS at 08:05

## 2025-05-08 RX ADMIN — Medication 81 MG: at 11:05

## 2025-05-08 NOTE — CONSULTS
Consults  [facility]    Cardiology  Consult Note    Patient Name: Margo Mishra  MRN: [id number]  Admission Date: 5/7/2025  Hospital Length of Stay: 1 days  Code Status: DNR   Attending Provider: Reyes, Thairy G, DO   Consulting Provider: LEONIDAS Mueller  Primary Care Physician: Jessika, Primary Doctor  Principal Problem:<principal problem not specified>    Patient information was obtained from patient and ER records.     Subjective:     Chief Complaint/Reason for Consult: New onset atrial fibrillation    HPI:   Ms. Margo Mishra is a 89 year old known to Dr. Goldstein (2017), who presented to [facility] on 5.7.25 after an unwitnessed fall out of the shower. She has a history of HTN, arthritis, breast cancer and COPD. Upon workup CT of the abd/pelvis revealed a small pericardial effusion, consolidation in the right middle and left upper lobe, ground glass opacities, pleural thickening. EKG revealed Atrial fibrillation. [facility] is being consulted for new onset atrial fibrillation.      PMH: HTN, arthritis, breast cancer and COPD.   PSH: s/p colon resection, cataract surgery, hysterectomy, appendectomy, tonsillectomy  Family History: Father-cancer, daughter-cancer  Social History: Ex smoker, denied alcohol and drug use    Previous Cardiac Diagnostics:   Echocardiogram 6/17  EF 65%, LVH, mod TR        Past Medical History:   Diagnosis Date    Hypertension      Past Surgical History:   Procedure Laterality Date    HYSTERECTOMY      comp hyst at age 38    OOPHORECTOMY      comp hyst at age 38     Review of patient's allergies indicates:   Allergen Reactions    Codeine      Other reaction(s): Agitation, Confusion     No current facility-administered medications on file prior to encounter.     Current Outpatient Medications on File Prior to Encounter   Medication Sig    aspirin (ECOTRIN) 81 MG EC tablet Take 81 mg by mouth once daily.    cholecalciferol, vitamin D3, 125 mcg (5,000 unit) TbDL Take 1 tablet by  mouth Daily.    cinacalcet (SENSIPAR) 30 MG Tab Take 30 mg by mouth daily with breakfast.    folic acid (FOLVITE) 1 MG tablet Take 1,000 mcg by mouth once daily.    gabapentin (NEURONTIN) 300 MG capsule Take 300 mg by mouth Daily.    gabapentin (NEURONTIN) 600 MG tablet Take 600 mg by mouth every evening.    methotrexate 2.5 MG Tab Take 2.5 mg by mouth every 7 days.    polyvinyl alcohol, artificial tears, (LIQUIFILM TEARS) 1.4 % ophthalmic solution Place 1 drop into both eyes 2 (two) times a day.    PROAIR RESPICLICK 90 mcg/actuation inhaler 1 puff every 4 (four) hours as needed.    TRELEGY ELLIPTA 100-62.5-25 mcg DsDv Take 1 puff by mouth once daily.    [DISCONTINUED] amLODIPine (NORVASC) 2.5 MG tablet Take 2.5 mg by mouth once daily. (Patient not taking: Reported on 7/15/2024)    [DISCONTINUED] cinacalcet (SENSIPAR) 30 MG Tab     [DISCONTINUED] ergocalciferol (ERGOCALCIFEROL) 50,000 unit Cap Take 50,000 Units by mouth twice a week. Pt states that she takes once a month.    [DISCONTINUED] HYDROcodone-acetaminophen (NORCO)  mg per tablet Take 1 tablet by mouth 2 (two) times daily as needed. (Patient not taking: Reported on 7/15/2024)    [DISCONTINUED] meclizine (ANTIVERT) 25 mg tablet Take 1 tablet (25 mg total) by mouth 3 (three) times daily as needed for Dizziness.    [DISCONTINUED] oxybutynin (DITROPAN) 5 MG Tab Take 5 mg by mouth. (Patient not taking: Reported on 7/15/2024)     Family History    None       Tobacco Use    Smoking status: Former     Types: Cigarettes    Smokeless tobacco: Never   Substance and Sexual Activity    Alcohol use: Not Currently    Drug use: Not Currently    Sexual activity: Not on file       Review of Systems   Constitutional:  Positive for fatigue.   Cardiovascular: Negative.    All other systems reviewed and are negative.    Objective:     Vital Signs (Most Recent):  Temp: 98.6 °F (37 °C) (05/08/25 0734)  Pulse: 92 (05/08/25 0734)  Resp: 20 (05/08/25 0445)  BP: (!) 132/59  "(05/08/25 0734)  SpO2: 95 % (05/08/25 0734) Vital Signs (24h Range):  Temp:  [97.9 °F (36.6 °C)-99.1 °F (37.3 °C)] 98.6 °F (37 °C)  Pulse:  [] 92  Resp:  [18-27] 20  SpO2:  [93 %-99 %] 95 %  BP: ()/(59-97) 132/59   Weight: 102.1 kg (225 lb)  Body mass index is 37.44 kg/m².  SpO2: 95 %       Intake/Output Summary (Last 24 hours) at 5/8/2025 0831  Last data filed at 5/8/2025 0622  Gross per 24 hour   Intake --   Output 400 ml   Net -400 ml     Lines/Drains/Airways       Peripheral Intravenous Line  Duration                  Peripheral IV - Single Lumen 05/07/25 1750 20 G Anterior;Right Antecubital <1 day                  Significant Labs:   Chemistries:   Recent Labs   Lab 05/07/25 1821 05/07/25 2133 05/08/25 0333   *  --  135*   K 3.4*  --  3.2*     --  105   CO2 20*  --  19*   BUN 20.5*  --  20.5*   CREATININE 1.14*  --  0.93   CALCIUM 8.1*  --  7.9*   PROT 7.5  --  6.7   BILITOT 2.3*  --  1.2   ALKPHOS 120  --  105   ALT 10  --  10   AST 23  --  22   MG  --   --  1.60   TROPONINI  --  0.051* 0.053*        CBC/Anemia Labs: Coags:    Recent Labs   Lab 05/07/25 1821 05/08/25 0333   WBC 10.78 9.84   HGB 12.7 10.9*   HCT 37.4 33.6*    219   MCV 89.7 90.8   RDW 13.4 13.4    No results for input(s): "PT", "INR", "APTT" in the last 168 hours.     Significant Imaging:  Imaging Results              CT Chest Abdomen Pelvis With IV Contrast (XPD) NO Oral Contrast (Final result)  Result time 05/08/25 08:14:49      Final result by Karoline Santiago MD (05/08/25 08:14:49)                   Impression:      1. Multilobar consolidative opacities. Correlate for clinical signs and symptoms of pneumonia.  2. The preliminary and final reports are concordant.      Electronically signed by: Karoline Santiago  Date:    05/08/2025  Time:    08:14               Narrative:    EXAMINATION:  CT CHEST ABDOMEN PELVIS WITH IV CONTRAST (XPD)    CLINICAL HISTORY:  trauma;  right side " pain    TECHNIQUE:  Helical acquisition with axial, sagittal and coronal reformations obtained from the thoracic inlet to the pubic symphysis following the IV administration of contrast.    Automated tube current modulation, weight-based exposure dosing, and/or iterative reconstruction technique utilized to reach lowest reasonably achievable exposure rate.    DLP: 1409 mGy*cm    COMPARISON:  CT abdomen pelvis 05/22/2018, PET-CT 07/08/2024    FINDINGS:  CHEST:      BASE OF NECK: No significant abnormality.    HEART: Normal size.  Trace pericardial effusion.  There are coronary artery calcifications.    THORACIC VASCULATURE: Aortic atherosclerosis. .Pulmonary arteries enhance normally.    OPAL/MEDIASTINUM: No enlarged lymph nodes by size criteria.    AIRWAYS: Patent.    LUNGS/PLEURA: There are multilobar consolidative opacities and nodular infiltrates bilaterally.    THORACIC SOFT TISSUES: A 2.9 cm left paraspinal nodule is unchanged compared to prior PET-CT.    ABDOMEN PELVIS:    LIVER: There are incidental hepatic cysts.    BILIARY: Postop cholecystectomy.  Mild biliary ectasia.    PANCREAS: No inflammatory change.    SPLEEN: Normal in size    ADRENALS: Unchanged    KIDNEYS/URETERS: Similar Bosniak 2 F right renal cyst with partially calcified septation.  Tiny exophytic left renal cortical lesion is hyperdense, likely proteinaceous or hemorrhagic cyst.  Is difficult to exclude tiny enhancing nodule.  Unchanged compared to prior PET-CT.  No hydronephrosis.    GI TRACT/MESENTERY: There are postsurgical changes of right hemicolectomy.  There is diastasis of the rectus musculature with bulging of the peritoneum and complex periumbilical and infraumbilical ventral hernias containing nonobstructed bowel.    PERITONEUM: No free fluid.No free air.    ABDOMINOPELVIC LYMPH NODES: No enlarged lymph nodes by size criteria.    ABDOMINOPELVIC VASCULATURE: Aortoiliac atherosclerosis.    BLADDER: Normal appearance given degree of  distention.    REPRODUCTIVE ORGANS: Postop hysterectomy.  Unchanged cystic collection at the right adnexa with no perceptible wall.    ABDOMINAL WALL: Ventral hernias as above    BONES: No appreciable fracture.  Scoliosis.  Degenerative change at the spine and imaged joints.                        Preliminary result by Meir Smith Jr., MD (05/07/25 19:56:54)                   Impression:    1. A small pericardial effusion is present.  2. There is consolidation in the right middle lobe and left upper lobe, likely due to pneumonia. Minimal ground glass and nodular densities measuring up to 0.7 cm are also seen in the right upper lobe. There are minimal ground glass opacities in the basal segments of the bilateral lower lobes. These findings are also likely due to an infectious process.  3. There is somewhat nodular pleural thickening in the basal segments of the bilateral lower lobes.  4. No acute traumatic intrathoracic pathology identified. No acute traumatic intraabdominal or pelvic solid organ or bowel pathology identified. Details and findings as discussed above.               Narrative:    START OF REPORT:  Technique: CT Scan of the chest abdomen and pelvis was performed with intravenous contrast with axial as well as sagittal and, coronal images.    Dosage Information: Automated Exposure Control was utilized.    Comparison: Comparison is with chest CT [id number] and CT abdomen dated 05-.    Clinical History: Fall, r side pain.    Findings:  Lines and Tubes: None.  Neck: The visualized soft tissues of the neck appear unremarkable.  Mediastinum: A few subcentimeter mediastinal lymph nodes are seen.  Heart: The heart size is within normal limits. A small pericardial effusion is present.  Aorta: Mild aortic calcification is seen in the thoracic aorta.  Pulmonary Arteries: Unremarkable.  Lungs: There is consolidation in the right middle lobe and left upper lobe, likely due to pneumonia. Minimal  ground glass and nodular densities measuring up to 0.7 cm are also seen in the right upper lobe. There are minimal ground glass opacities in the basal segments of the bilateral lower lobes.  Pleura: No effusions or pneumothorax are identified. There is somewhat nodular pleural thickening in the basal segments of the bilateral lower lobes.  Bony Structures:  Spine: Moderate spondylolytic changes are seen in the thoracic spine. Dextroconvex scoliosis of the thoracic spine.  Ribs: No rib fractures are identified.  Abdomen:  Abdominal Wall: There is a medium sized umbilical hernia which contains a portion of the transverse colon, without evidence of strangulation. Another large anterior wall hernia is seen in the infraumbilical area measuring 9.0 x 16.2 cm (AP x width), with herniation of some small bowel loops, without evidence of strangulation.  Liver: There are stable-appearing cysts in the left hepatic lobe, measuring up to 1.2 cm.  Biliary System: The intrahepatic and extra hepatic biliary system appears prominent which may reflect prior obstructive physiology in this patient status post cholecystectomy.  Gallbladder: Surgical clips are seen in the gallbladder fossa consistent with prior cholecystectomy.  Pancreas: Moderate pancreatic atrophy is seen.  Spleen: The spleen appears unremarkable.  Adrenals: The left adrenal gland appears prominent with a 0.8 cm hypodense nodule in the genu, probably an adenoma. The right adrenal gland is unremarkable.  Kidneys: The left kidney appears unremarkable with no stones cysts masses or hydronephrosis. There is no change in the appearance of the lobulated cyst with few septa and tin rim calcification in the lower pole of the right kidney, presently measuring 2.6 x 1.6 cm. The right kidney is otherwise unremarkable without stones, masses or hydronephrosis.  Aorta: There is moderate calcification of the abdominal aorta and its branches.  IVC: Unremarkable.  Bowel:  Esophagus: The  visualized distal esophagus appears unremarkable.  Stomach: The stomach appears unremarkable.  Duodenum: Unremarkable appearing duodenum.  Small Bowel: The small bowel appears unremarkable.  Colon: Nondistended.  Appendix: The appendix is not identified but no inflammatory changes are seen in the right lower quadrant to suggest appendicitis.    Pelvis:  Bladder: The bladder appears unremarkable.  Female:  Uterus: The uterus is not identified. There is no change in the 5.4 cm cystic structure in the pelvic fossa, probably related to previous post-procedural changes.    Bony structures:  Dorsal Spine: There is moderate spondylosis of the visualized dorsal spine. Lumbar levoscoliosis is noted.  Bony Pelvis: There are bone islands in the right sacrum and left iliac bone. Right hip osteoarthritis is noted.                                         CT Head Without Contrast (Final result)  Result time 05/08/25 06:52:31      Final result by Ja Burch MD (05/08/25 06:52:31)                   Impression:      No acute intracranial pathology.    Mild generalized brain atrophy and mild chronic small vessel ischemic changes.    I agree with the preliminary report.      Electronically signed by: Ja Burch  Date:    05/08/2025  Time:    06:52               Narrative:    EXAMINATION:  CT HEAD WITHOUT CONTRAST    CLINICAL HISTORY:  fall;    TECHNIQUE:  Low dose axial images were obtained through the head.  Coronal and sagittal reformations were also performed. Contrast was not administered.  Dose reduction techniques including automatic exposure control (AEC) were utilized.    Dose (DLP): 1408 mGycm    COMPARISON:  CT head without contrast 01/12/2023.    FINDINGS:  INTRACRANIAL: Mild chronic small-vessel ischemic changes of the supratentorial white matter.  Mild generalized brain atrophy.  No acute intracranial hemorrhage.  No hydrocephalus.  No intracranial mass effect.    SINUSES: Visualized paranasal sinuses and mastoids  are clear.    SKULL/SCALP: Visualized osseous structures are normal.    ORBITS: Bilateral lens replacements.                        Preliminary result by Meir Smith Jr., MD (05/07/25 19:33:36)                   Impression:    1. No acute intracranial traumatic injury identified. Details and other findings as noted above.               Narrative:    START OF REPORT:  Technique: CT of the head was performed without intravenous contrast with axial as well as coronal and sagittal images.    Comparison: Comparison is with study [id number] 09:05:56.    Dosage Information: Automated exposure control was utilized.    Clinical history: Fall, r side pain.    Findings:  Hemorrhage: No acute intracranial hemorrhage is seen.  CSF spaces: The ventricles, sulci and basal cisterns all appear mildly prominent consistent with global cerebral atrophy. This finding is stable since the prior study.  Brain parenchyma: There is preservation of the grey white junction throughout. No acute infarct is identified. Mild microvascular change is seen in portions of the periventricular and deep white matter tracts.  Cerebellum: Unremarkable.  Vascular: Unremarkable venous sinuses. Stable appearing atheromatous calcification of the intracranial arteries is seen.  Sella and skull base: The sella appears to be within normal limits for age.  Intracranial calcifications: Incidental note is made of bilateral choroid plexus calcification. Incidental note is made of some pineal region calcification. Incidental note is made of some calcification of the falx.  Calvarium: No acute linear or depressed skull fracture is seen.    Maxillofacial Structures:  Paranasal sinuses: There is some air fluid levels in the left maxillary sinuses.  Orbits: The orbits appear unremarkable.  Temporal bones and mastoids: The temporal bones and mastoids appear unremarkable.  TMJ: The mandibular condyles appear normally placed with respect to the mandibular  fossa.  Nasal Bones: The nasal septum is midline. No displaced nasal bone fracture is seen.    Visualized upper cervical spine: The visualized cervical spine appears unremarkable.                                         CT Cervical Spine Without Contrast (Final result)  Result time 05/08/25 06:59:01      Final result by Ja Burch MD (05/08/25 06:59:01)                   Impression:      No acute osseous abnormality.    Multilevel cervical degeneration as detailed above.    No significant discrepancy between preliminary and final reports.      Electronically signed by: Ja Burch  Date:    05/08/2025  Time:    06:59               Narrative:    EXAMINATION:  CT CERVICAL SPINE WITHOUT CONTRAST    CLINICAL HISTORY:  fall;    TECHNIQUE:  Low dose axial images, sagittal and coronal reformations were performed though the cervical spine. Contrast was not administered. Dose reduction techniques including automatic exposure control (AEC) were utilized.    Dose (DLP): 1408 mGycm    COMPARISON:  None    FINDINGS:  Vertebral column alignment is normal.  Lateral masses of C1 and C2 are congruent.    No acute fracture is demonstrated.  Vertebral body heights are maintained.    Moderate diffuse cervical disc degeneration.  Central canal is patent.  Moderate foraminal stenosis bilaterally at C5-C6 and C6-C7.    Paravertebral soft tissues are normal.                        Preliminary result by Meir Smith Jr., MD (05/07/25 19:31:01)                   Impression:    1. No acute cervical spine fracture dislocation or subluxation is seen.  2. Degenerative changes and other details as above.               Narrative:    START OF REPORT:  Technique: CT of the cervical spine was performed without intravenous contrast with axial as well as sagittal and coronal images.    Comparison: None.    Dosage Information: Automated exposure control was utilized.    Clinical history: Fall, r side pain.    Findings:  Lung apices: The  visualized lung apices appear unremarkable.  Spine:  Spinal canal: The spinal canal appears unremarkable.  Spinal cord: The spinal cord appears unremarkable.  Mineralization: Mild osteopenia is seen in the visualized bony structures of the cervical spine.  Rotation: No significant rotation is seen.  Scoliosis: No significant scoliosis is seen.  Vertebral Fusion: No vertebral fusion is identified.  Listhesis: No significant listhesis is identified.  Lordosis: Moderate straightening of the cervical lordosis is seen. This may be positional or reflect an element of myospasm.  Intervertebral disc spaces: Multilevel loss of disc height is seen.  Osteophytes: Mild multilevel endplate osteophytes are seen.  Uncovertebral degenerative changes: Mild multilevel uncovertebral joint arthrosis is seen.  Facet degenerative changes: Mild to moderate multilevel facet degenerative changes are seen.  Fractures: No acute cervical spine fracture dislocation or subluxation is seen.  Orthopedic Hardware: None.    Miscellaneous:  Mastoid air cells: The visualized mastoid air cells appear clear.  Soft Tissues: Unremarkable.                                      EKG:     Telemetry:  AF    Physical Exam  Vitals reviewed.   Constitutional:       Appearance: Normal appearance.   Cardiovascular:      Rate and Rhythm: Tachycardia present. Rhythm irregular.      Pulses: Normal pulses.      Heart sounds: Normal heart sounds.   Pulmonary:      Effort: Pulmonary effort is normal.      Breath sounds: Normal breath sounds.   Abdominal:      General: Abdomen is flat. Bowel sounds are normal.   Skin:     General: Skin is warm and dry.      Capillary Refill: Capillary refill takes less than 2 seconds.   Neurological:      General: No focal deficit present.      Mental Status: She is alert.       Home Medications:   Medications Ordered Prior to Encounter[1]  Current Schedule Inpatient Medications:   albuterol-ipratropium  3 mL Nebulization Q6H     azithromycin  500 mg Intravenous Q24H    cefTRIAXone (Rocephin) IV (PEDS and ADULTS)  2 g Intravenous Q24H     Continuous Infusions:    Assessment:     New onset atrial fibrillation  Chads 4  NSTEMI probable type II in the setting of pneumonia  Pneumonia  HTN  Arthritis  Breast cancer  COPD.     Plan:     Start eliquis 5 mg po bid  Start toprol xl 25 mg po daily  Obtain an echocardiogram  Continue home meds  Antibiotics per primary  Keep K>4 and Mag>2  Obtain a TSH        Thank you for your consult.     LEONIDAS Mueller  Cardiology  [facility]        [1]   No current facility-administered medications on file prior to encounter.     Current Outpatient Medications on File Prior to Encounter   Medication Sig Dispense Refill    aspirin (ECOTRIN) 81 MG EC tablet Take 81 mg by mouth once daily.      cholecalciferol, vitamin D3, 125 mcg (5,000 unit) TbDL Take 1 tablet by mouth Daily.      cinacalcet (SENSIPAR) 30 MG Tab Take 30 mg by mouth daily with breakfast.      folic acid (FOLVITE) 1 MG tablet Take 1,000 mcg by mouth once daily.      gabapentin (NEURONTIN) 300 MG capsule Take 300 mg by mouth Daily.      gabapentin (NEURONTIN) 600 MG tablet Take 600 mg by mouth every evening.      methotrexate 2.5 MG Tab Take 2.5 mg by mouth every 7 days.      polyvinyl alcohol, artificial tears, (LIQUIFILM TEARS) 1.4 % ophthalmic solution Place 1 drop into both eyes 2 (two) times a day.      PROAIR RESPICLICK 90 mcg/actuation inhaler 1 puff every 4 (four) hours as needed.      TRELEGY ELLIPTA 100-62.5-25 mcg DsDv Take 1 puff by mouth once daily.      [DISCONTINUED] amLODIPine (NORVASC) 2.5 MG tablet Take 2.5 mg by mouth once daily. (Patient not taking: Reported on 7/15/2024)      [DISCONTINUED] cinacalcet (SENSIPAR) 30 MG Tab       [DISCONTINUED] ergocalciferol (ERGOCALCIFEROL) 50,000 unit Cap Take 50,000 Units by mouth twice a week. Pt states that she takes once a month.      [DISCONTINUED]  HYDROcodone-acetaminophen (NORCO)  mg per tablet Take 1 tablet by mouth 2 (two) times daily as needed. (Patient not taking: Reported on 7/15/2024)      [DISCONTINUED] meclizine (ANTIVERT) 25 mg tablet Take 1 tablet (25 mg total) by mouth 3 (three) times daily as needed for Dizziness. 30 tablet 1    [DISCONTINUED] oxybutynin (DITROPAN) 5 MG Tab Take 5 mg by mouth. (Patient not taking: Reported on 7/15/2024)

## 2025-05-08 NOTE — H&P
[facility]  Hospital Medicine History & Physical Examination       Patient Name: Margo Mishra  MRN: [id number]  Patient Class: IP- Inpatient   Admission Date: 5/7/2025   Admitting Physician: HUMBERTO Service   Length of Stay: 1  Attending Physician: Johan Bonilla MD  Primary Care Provider: Jessika, Primary Doctor  Face-to-Face encounter date: 05/08/2025  Code Status:  DNR  Chief Complaint: Fall (Pt to ED via [facility]. Pt had unwitnessed of fall while in the shower. Pt endorses L sided pain which was present prior to fall. -headstrike -LOC GCS 15)                  HISTORY OF PRESENT ILLNESS:   Margo Mishra is a 89 y.o. female who  has a past medical history of Hypertension..       89-year-old nursing home resident woman with a history of HTN, breast cancer, COPD sent from facility after an unwitnessed fall.  Patient reported that she fell while she was coming out of the shower and denied any syncopal event, dizziness associated with the event.      At presentation she was afebrile, hemodynamically stable and was mildly hypoxic requiring nasal cannula oxygen supplementation.  CT cervical spine, CT head, showed no acute findings.  CT chest showed multilobar consolidative opacities, possibly pneumonia, aortoiliac atherosclerosis, degenerative spine disease, surgical changes of past right hemicolectomy, umbilical hernias containing nonobstructed bowel were seen.    Lab work showed mild hypokalemia, borderline hyponatremia, mildly elevated BUN and serum creatinine at admission.  BUN and serum creatinine has improved since admission.  She also has mild NSTEMI for which Cardiology was consulted and EKG showed AFib with a RVR.  Cardiology was consulted.  Lactate was within normal limits.  She was admitted to hospital medicine.    When seen at bedside she was alert, baseline mental status.  She has hearing deficits.  No family members were seen at bedside.  She denies any chest pain, palpitations,  dizziness.  She seems to have trouble speak in full sentences and getting shortness for breath during conversation.    PAST MEDICAL HISTORY:     Past Medical History:   Diagnosis Date    Hypertension        PAST SURGICAL HISTORY:     Past Surgical History:   Procedure Laterality Date    HYSTERECTOMY      comp hyst at age 38    OOPHORECTOMY      comp hyst at age 38       ALLERGIES:   Codeine    FAMILY HISTORY:   Reviewed and negative    SOCIAL HISTORY:     Social History     Tobacco Use    Smoking status: Former     Types: Cigarettes    Smokeless tobacco: Never   Substance Use Topics    Alcohol use: Not Currently        HOME MEDICATIONS:     Prior to Admission medications    Medication Sig Start Date End Date Taking? Authorizing Provider   aspirin (ECOTRIN) 81 MG EC tablet Take 81 mg by mouth once daily.   Yes Provider, Historical   cholecalciferol, vitamin D3, 125 mcg (5,000 unit) TbDL Take 1 tablet by mouth Daily.   Yes Provider, Historical   cinacalcet (SENSIPAR) 30 MG Tab Take 30 mg by mouth daily with breakfast.   Yes Provider, Historical   folic acid (FOLVITE) 1 MG tablet Take 1,000 mcg by mouth once daily. 8/2/22  Yes Provider, Historical   gabapentin (NEURONTIN) 300 MG capsule Take 300 mg by mouth Daily. 4/26/22  Yes Provider, Historical   gabapentin (NEURONTIN) 600 MG tablet Take 600 mg by mouth every evening.   Yes Provider, Historical   methotrexate 2.5 MG Tab Take 2.5 mg by mouth every 7 days. 6/3/22  Yes Provider, Historical   polyvinyl alcohol, artificial tears, (LIQUIFILM TEARS) 1.4 % ophthalmic solution Place 1 drop into both eyes 2 (two) times a day.   Yes Provider, Historical   PROAIR RESPICLICK 90 mcg/actuation inhaler 1 puff every 4 (four) hours as needed. 10/29/22  Yes Provider, Historical   TRELEGY ELLIPTA 100-62.5-25 mcg DsDv Take 1 puff by mouth once daily. 8/25/22  Yes Provider, Historical   amLODIPine (NORVASC) 2.5 MG tablet Take 2.5 mg by mouth once daily.  Patient not taking: Reported on  7/15/2024 8/2/22 5/8/25  Provider, Historical   cinacalcet (SENSIPAR) 30 MG Tab  6/24/22 5/8/25  Provider, Historical   ergocalciferol (ERGOCALCIFEROL) 50,000 unit Cap Take 50,000 Units by mouth twice a week. Pt states that she takes once a month. 8/9/22 5/8/25  Provider, Historical   HYDROcodone-acetaminophen (NORCO)  mg per tablet Take 1 tablet by mouth 2 (two) times daily as needed.  Patient not taking: Reported on 7/15/2024 11/17/22 5/8/25  Provider, Historical   meclizine (ANTIVERT) 25 mg tablet Take 1 tablet (25 mg total) by mouth 3 (three) times daily as needed for Dizziness. 1/12/23 5/8/25  Luis Alberto Velasquez MD   oxybutynin (DITROPAN) 5 MG Tab Take 5 mg by mouth.  Patient not taking: Reported on 7/15/2024 9/14/21 5/8/25  Provider, Historical       REVIEW OF SYSTEMS:   Except as documented, all other systems reviewed and negative     PHYSICAL EXAM:     VITAL SIGNS: 24 HRS MIN & MAX LAST   Temp  Min: 97.9 °F (36.6 °C)  Max: 99.1 °F (37.3 °C) 98.6 °F (37 °C)   BP  Min: 95/63  Max: 143/64 (!) 132/59   Pulse  Min: 62  Max: 116  92   Resp  Min: 18  Max: 27 20   SpO2  Min: 93 %  Max: 99 % 95 %     General appearance: Well-developed, well-nourished female in no apparent distress.  HENT: Atraumatic head. Moist mucous membranes of oral cavity.  Eyes: Normal extraocular movements.   Neck: Supple.   Lungs:  Mildly labored, bilateral rhonchi, diminished sounds on basally   Heart: Regular rate and rhythm. S1 and S2 present with no murmurs/gallop/rub. No pedal edema. No JVD present.   Abdomen: Soft, non-distended, non-tender. No rebound tenderness/guarding. Bowel sounds are normal.   Extremities: No cyanosis, clubbing, or edema.  Skin: No Rash.   Neuro: Motor and sensory exams grossly intact. Good tone. Muscle strength 5/5 in all 4 extremities  Psych/mental status: Appropriate mood and affect. Responds appropriately to questions.     LABS AND IMAGING:     Recent Labs   Lab 05/07/25  1822 05/08/25  0339   WBC  10.78 9.84   RBC 4.17* 3.70*   HGB 12.7 10.9*   HCT 37.4 33.6*   MCV 89.7 90.8   MCH 30.5 29.5   MCHC 34.0 32.4*   RDW 13.4 13.4    219   MPV 9.3 9.9       Recent Labs   Lab 05/07/25  1821 05/08/25  0333   * 135*   K 3.4* 3.2*    105   CO2 20* 19*   BUN 20.5* 20.5*   CREATININE 1.14* 0.93   * 91   CALCIUM 8.1* 7.9*   MG  --  1.60   ALBUMIN 2.9* 2.5*   PROT 7.5 6.7   ALKPHOS 120 105   ALT 10 10   AST 23 22   BILITOT 2.3* 1.2       Microbiology Results (last 7 days)       Procedure Component Value Units Date/Time    Blood culture #1 **CANNOT BE ORDERED STAT** [[id number]] Collected: 05/07/25 2133    Order Status: Resulted Specimen: Blood Updated: 05/08/25 0024    Blood culture #2 **CANNOT BE ORDERED STAT** [[id number]] Collected: 05/07/25 2133    Order Status: Resulted Specimen: Blood Updated: 05/08/25 0024    Respiratory Culture [[id number]]     Order Status: Sent Specimen: Sputum              CT Chest Abdomen Pelvis With IV Contrast (XPD) NO Oral Contrast  Narrative: EXAMINATION:  CT CHEST ABDOMEN PELVIS WITH IV CONTRAST (XPD)    CLINICAL HISTORY:  trauma;  right side pain    TECHNIQUE:  Helical acquisition with axial, sagittal and coronal reformations obtained from the thoracic inlet to the pubic symphysis following the IV administration of contrast.    Automated tube current modulation, weight-based exposure dosing, and/or iterative reconstruction technique utilized to reach lowest reasonably achievable exposure rate.    DLP: 1409 mGy*cm    COMPARISON:  CT abdomen pelvis 05/22/2018, PET-CT 07/08/2024    FINDINGS:  CHEST:    BASE OF NECK: No significant abnormality.    HEART: Normal size.  Trace pericardial effusion.  There are coronary artery calcifications.    THORACIC VASCULATURE: Aortic atherosclerosis. .Pulmonary arteries enhance normally.    OPAL/MEDIASTINUM: No enlarged lymph nodes by size criteria.    AIRWAYS: Patent.    LUNGS/PLEURA: There are multilobar consolidative opacities  and nodular infiltrates bilaterally.    THORACIC SOFT TISSUES: A 2.9 cm left paraspinal nodule is unchanged compared to prior PET-CT.    ABDOMEN PELVIS:    LIVER: There are incidental hepatic cysts.    BILIARY: Postop cholecystectomy.  Mild biliary ectasia.    PANCREAS: No inflammatory change.    SPLEEN: Normal in size    ADRENALS: Unchanged    KIDNEYS/URETERS: Similar Bosniak 2 F right renal cyst with partially calcified septation.  Tiny exophytic left renal cortical lesion is hyperdense, likely proteinaceous or hemorrhagic cyst.  Is difficult to exclude tiny enhancing nodule.  Unchanged compared to prior PET-CT.  No hydronephrosis.    GI TRACT/MESENTERY: There are postsurgical changes of right hemicolectomy.  There is diastasis of the rectus musculature with bulging of the peritoneum and complex periumbilical and infraumbilical ventral hernias containing nonobstructed bowel.    PERITONEUM: No free fluid.No free air.    ABDOMINOPELVIC LYMPH NODES: No enlarged lymph nodes by size criteria.    ABDOMINOPELVIC VASCULATURE: Aortoiliac atherosclerosis.    BLADDER: Normal appearance given degree of distention.    REPRODUCTIVE ORGANS: Postop hysterectomy.  Unchanged cystic collection at the right adnexa with no perceptible wall.    ABDOMINAL WALL: Ventral hernias as above    BONES: No appreciable fracture.  Scoliosis.  Degenerative change at the spine and imaged joints.  Impression: 1. Multilobar consolidative opacities. Correlate for clinical signs and symptoms of pneumonia.  2. The preliminary and final reports are concordant.    Electronically signed by: Karoline Santiago  Date:    05/08/2025  Time:    08:14  CT Cervical Spine Without Contrast  Narrative: EXAMINATION:  CT CERVICAL SPINE WITHOUT CONTRAST    CLINICAL HISTORY:  fall;    TECHNIQUE:  Low dose axial images, sagittal and coronal reformations were performed though the cervical spine. Contrast was not administered. Dose reduction techniques including automatic  exposure control (AEC) were utilized.    Dose (DLP): 1408 mGycm    COMPARISON:  None    FINDINGS:  Vertebral column alignment is normal.  Lateral masses of C1 and C2 are congruent.    No acute fracture is demonstrated.  Vertebral body heights are maintained.    Moderate diffuse cervical disc degeneration.  Central canal is patent.  Moderate foraminal stenosis bilaterally at C5-C6 and C6-C7.    Paravertebral soft tissues are normal.  Impression: No acute osseous abnormality.    Multilevel cervical degeneration as detailed above.    No significant discrepancy between preliminary and final reports.    Electronically signed by: Ja Burch  Date:    05/08/2025  Time:    06:59  CT Head Without Contrast  Narrative: EXAMINATION:  CT HEAD WITHOUT CONTRAST    CLINICAL HISTORY:  fall;    TECHNIQUE:  Low dose axial images were obtained through the head.  Coronal and sagittal reformations were also performed. Contrast was not administered.  Dose reduction techniques including automatic exposure control (AEC) were utilized.    Dose (DLP): 1408 mGycm    COMPARISON:  CT head without contrast 01/12/2023.    FINDINGS:  INTRACRANIAL: Mild chronic small-vessel ischemic changes of the supratentorial white matter.  Mild generalized brain atrophy.  No acute intracranial hemorrhage.  No hydrocephalus.  No intracranial mass effect.    SINUSES: Visualized paranasal sinuses and mastoids are clear.    SKULL/SCALP: Visualized osseous structures are normal.    ORBITS: Bilateral lens replacements.  Impression: No acute intracranial pathology.    Mild generalized brain atrophy and mild chronic small vessel ischemic changes.    I agree with the preliminary report.    Electronically signed by: Ja Burch  Date:    05/08/2025  Time:    06:52      ASSESSMENT & PLAN:     Mechanical fall  Lung opacities seen on CT-?  Community-acquired pneumonia   New onset AFib with RVR at admission  Mild hypokalemia -POA  Borderline hyponatremia-POA  NSTEMI likely  type 2 at admission    HX: Obesity, HTN, arthritis, history of breast cancer, chronic COPD, hearing deficits     Plan:   -continue telemetry.  Beta blocker and Eliquis was added by Cardiology today.  Replete potassium.  Maintain adequate potassium magnesium levels.  Cardiology consulted for AFib.  Needs TTE.  Check TSH.  -PT OT eval.  Use analgesics as needed   -continue ceftriaxone azithromycin.  Obtain respiratory PCR, MRSA PCR, respiratory panel, respiratory cultures  -will review the home medications .  We will keep her on neb treatments and continue home inhalers.    Eliquis        __________________________________________________________________________  INPATIENT LIST OF MEDICATIONS   Current Medications[1]      Scheduled Meds:   albuterol-ipratropium  3 mL Nebulization Q6H    apixaban  5 mg Oral BID    aspirin  81 mg Oral Daily    azithromycin  500 mg Intravenous Q24H    cefTRIAXone (Rocephin) IV (PEDS and ADULTS)  2 g Intravenous Q24H    [START ON 5/9/2025] cinacalcet  30 mg Oral Daily with breakfast    fluticasone-umeclidin-vilanter  1 puff Oral Daily    folic acid  1,000 mcg Oral Daily    gabapentin  200 mg Oral BID    metoprolol succinate  25 mg Oral Daily     Continuous Infusions:  PRN Meds:.  Current Facility-Administered Medications:     acetaminophen, 650 mg, Oral, Q4H PRN    albuterol, 1 puff, Inhalation, Q6H PRN    aluminum-magnesium hydroxide-simethicone, 30 mL, Oral, QID PRN    bisacodyL, 10 mg, Rectal, Daily PRN    dextrose 50%, 12.5 g, Intravenous, PRN    dextrose 50%, 25 g, Intravenous, PRN    glucagon (human recombinant), 1 mg, Intramuscular, PRN    glucose, 16 g, Oral, PRN    glucose, 24 g, Oral, PRN    levalbuterol, 1.25 mg, Nebulization, Q4H PRN    melatonin, 6 mg, Oral, Nightly PRN    polyethylene glycol, 17 g, Oral, BID PRN    sodium chloride 0.9%, 10 mL, Intravenous, PRN      Johan Bonilla MD   05/08/2025     Screening for Social Drivers for health:  Patient screened for food  insecurity, housing instability, transportation needs, utility difficulties, and interpersonal safety (select all that apply as identified as concern)  []Housing or Food  []Transportation Needs  []Utility Difficulties  []Interpersonal safety  [x]None         [1]   Current Facility-Administered Medications:     acetaminophen tablet 650 mg, 650 mg, Oral, Q4H PRN, Elizabeth Loco FNP    albuterol inhaler 1 puff, 1 puff, Inhalation, Q6H PRN, Johan Bonilla MD    albuterol-ipratropium 2.5 mg-0.5 mg/3 mL nebulizer solution 3 mL, 3 mL, Nebulization, Q6H, Reyes, Thairy G, DO    aluminum-magnesium hydroxide-simethicone 200-200-20 mg/5 mL suspension 30 mL, 30 mL, Oral, QID PRN, Elizabeth Loco, LEONIDAS    apixaban tablet 5 mg, 5 mg, Oral, BID, Patric Yadira B, FNP    aspirin EC tablet 81 mg, 81 mg, Oral, Daily, Johan Bonilla MD    azithromycin (ZITHROMAX) 500 mg in 0.9% NaCl 250 mL IVPB (admixture device), 500 mg, Intravenous, Q24H, Elizabeth Loco FNP    bisacodyL suppository 10 mg, 10 mg, Rectal, Daily PRN, Elizabeth Loco FNP    cefTRIAXone injection 2 g, 2 g, Intravenous, Q24H, Elizabeth Loco FNP    [START ON 5/9/2025] cinacalcet tablet 30 mg, 30 mg, Oral, Daily with breakfast, Johan Bonilla MD    dextrose 50% injection 12.5 g, 12.5 g, Intravenous, PRN, Elizabeth Loco, PADMINIP    dextrose 50% injection 25 g, 25 g, Intravenous, PRN, Elizabeth Loco, LEONIDAS    fluticasone-umeclidin-vilanter 100-62.5-25 mcg DsDv 1 puff, 1 puff, Oral, Daily, Johan Bonilla MD    folic acid tablet 1,000 mcg, 1,000 mcg, Oral, Daily, Johan Bonilla MD    gabapentin capsule 200 mg, 200 mg, Oral, BID, Johan Bonilla MD    glucagon (human recombinant) injection 1 mg, 1 mg, Intramuscular, PRN, Elizabeth Loco, LEONIDAS    glucose chewable tablet 16 g, 16 g, Oral, PRN, Elizabeth Loco, PADMINIP    glucose chewable tablet 24 g, 24 g, Oral, PRN, Elizabeth Loco, PADMINIP    levalbuterol nebulizer solution 1.25 mg, 1.25 mg, Nebulization,  Q4H PRN, Elizabeth Loco FNP    melatonin tablet 6 mg, 6 mg, Oral, Nightly PRN, Elizabeth Loco FNP    metoprolol succinate (TOPROL-XL) 24 hr tablet 25 mg, 25 mg, Oral, Daily, Yadira Spivey, PADMINIP    polyethylene glycol packet 17 g, 17 g, Oral, BID PRN, Elizabeth Loco FNP    sodium chloride 0.9% flush 10 mL, 10 mL, Intravenous, PRN, Elizabeth Loco FNP

## 2025-05-08 NOTE — PLAN OF CARE
05/08/25 1023   Discharge Assessment   Assessment Type Discharge Planning Assessment   Confirmed/corrected address, phone number and insurance Yes   Confirmed Demographics Correct on Facesheet   Source of Information patient   Communicated HEATHER with patient/caregiver Date not available/Unable to determine   Reason For Admission Shortness of breath   People in Home facility resident   Facility Arrived From: [facility]   Do you expect to return to your current living situation? Yes   Do you have help at home or someone to help you manage your care at home? Yes   Who are your caregiver(s) and their phone number(s)? NH staff   Prior to hospitilization cognitive status: Unable to Assess   Current cognitive status: Alert/Oriented   Walking or Climbing Stairs Difficulty yes   Walking or Climbing Stairs ambulation difficulty, requires equipment   Mobility Management wc, rw   Dressing/Bathing Difficulty yes   Dressing/Bathing bathing difficulty, assistance 1 person;dressing difficulty, assistance 1 person   Dressing/Bathing Management Staff supervision   Home Accessibility wheelchair accessible   Home Layout Able to live on 1st floor   Equipment Currently Used at Home walker, rolling;wheelchair   Patient currently being followed by outpatient case management? No   Do you currently have service(s) that help you manage your care at home? No   Do you have prescription coverage? Yes   Coverage Medicare   Who is going to help you get home at discharge? NH van   How do you get to doctors appointments? other (see comments)  (nh van)   Are you on dialysis? No   Do you take coumadin? No   Discharge Plan A Return to nursing home   Discharge Plan B Return to Nursing Home   DME Needed Upon Discharge  none   Discharge Plan discussed with: Patient   Transition of Care Barriers None   OTHER   Name(s) of People in Home Resident at [facility]     Resident at [facility]. Plan to return to nursing home upon dc.

## 2025-05-08 NOTE — H&P
[facility]  Hospital Medicine History & Physical Examination       Patient Name: Margo Mishra  MRN: [id number]  Patient Class: IP- Inpatient   Admission Date: 5/7/2025   Admitting Physician: HUMBERTO Service   Length of Stay: 1  Attending Physician: Johan Bonilla MD  Primary Care Provider: Jessika, Primary Doctor  Face-to-Face encounter date: 05/08/2025  Code Status:  DNR  Chief Complaint: Fall (Pt to ED via [facility]. Pt had unwitnessed of fall while in the shower. Pt endorses L sided pain which was present prior to fall. -headstrike -LOC GCS 15)                  HISTORY OF PRESENT ILLNESS:   Margo Mishra is a 89 y.o. female who  has a past medical history of Hypertension..       89-year-old nursing home resident woman with a history of HTN, breast cancer, COPD sent from facility after an unwitnessed fall.  Patient reported that she fell while she was coming out of the shower and denied any syncopal event, dizziness associated with the event.      At presentation she was afebrile, hemodynamically stable and was mildly hypoxic requiring nasal cannula oxygen supplementation.  CT cervical spine, CT head, showed no acute findings.  CT chest showed multilobar consolidative opacities, possibly pneumonia, aortoiliac atherosclerosis, degenerative spine disease, surgical changes of past right hemicolectomy, umbilical hernias containing nonobstructed bowel were seen.    Lab work showed mild hypokalemia, borderline hyponatremia, mildly elevated BUN and serum creatinine at admission.  BUN and serum creatinine has improved since admission.  She also has mild NSTEMI for which Cardiology was consulted and EKG showed AFib with a RVR.  Cardiology was consulted.  Lactate was within normal limits.  She was admitted to hospital medicine.    When seen at bedside she was alert, baseline mental status.  She has hearing deficits.  No family members were seen at bedside.  She denies any chest pain, palpitations,  dizziness.  She seems to have trouble speak in full sentences and getting shortness for breath during conversation.    PAST MEDICAL HISTORY:     Past Medical History:   Diagnosis Date    Hypertension        PAST SURGICAL HISTORY:     Past Surgical History:   Procedure Laterality Date    HYSTERECTOMY      comp hyst at age 38    OOPHORECTOMY      comp hyst at age 38       ALLERGIES:   Codeine    FAMILY HISTORY:   Reviewed and negative    SOCIAL HISTORY:     Social History     Tobacco Use    Smoking status: Former     Types: Cigarettes    Smokeless tobacco: Never   Substance Use Topics    Alcohol use: Not Currently        HOME MEDICATIONS:     Prior to Admission medications    Medication Sig Start Date End Date Taking? Authorizing Provider   aspirin (ECOTRIN) 81 MG EC tablet Take 81 mg by mouth once daily.   Yes Provider, Historical   cholecalciferol, vitamin D3, 125 mcg (5,000 unit) TbDL Take 1 tablet by mouth Daily.   Yes Provider, Historical   cinacalcet (SENSIPAR) 30 MG Tab Take 30 mg by mouth daily with breakfast.   Yes Provider, Historical   folic acid (FOLVITE) 1 MG tablet Take 1,000 mcg by mouth once daily. 8/2/22  Yes Provider, Historical   gabapentin (NEURONTIN) 300 MG capsule Take 300 mg by mouth Daily. 4/26/22  Yes Provider, Historical   gabapentin (NEURONTIN) 600 MG tablet Take 600 mg by mouth every evening.   Yes Provider, Historical   methotrexate 2.5 MG Tab Take 2.5 mg by mouth every 7 days. 6/3/22  Yes Provider, Historical   polyvinyl alcohol, artificial tears, (LIQUIFILM TEARS) 1.4 % ophthalmic solution Place 1 drop into both eyes 2 (two) times a day.   Yes Provider, Historical   PROAIR RESPICLICK 90 mcg/actuation inhaler 1 puff every 4 (four) hours as needed. 10/29/22  Yes Provider, Historical   TRELEGY ELLIPTA 100-62.5-25 mcg DsDv Take 1 puff by mouth once daily. 8/25/22  Yes Provider, Historical   amLODIPine (NORVASC) 2.5 MG tablet Take 2.5 mg by mouth once daily.  Patient not taking: Reported on  7/15/2024 8/2/22 5/8/25  Provider, Historical   cinacalcet (SENSIPAR) 30 MG Tab  6/24/22 5/8/25  Provider, Historical   ergocalciferol (ERGOCALCIFEROL) 50,000 unit Cap Take 50,000 Units by mouth twice a week. Pt states that she takes once a month. 8/9/22 5/8/25  Provider, Historical   HYDROcodone-acetaminophen (NORCO)  mg per tablet Take 1 tablet by mouth 2 (two) times daily as needed.  Patient not taking: Reported on 7/15/2024 11/17/22 5/8/25  Provider, Historical   meclizine (ANTIVERT) 25 mg tablet Take 1 tablet (25 mg total) by mouth 3 (three) times daily as needed for Dizziness. 1/12/23 5/8/25  Luis Alberto Velasquez MD   oxybutynin (DITROPAN) 5 MG Tab Take 5 mg by mouth.  Patient not taking: Reported on 7/15/2024 9/14/21 5/8/25  Provider, Historical       REVIEW OF SYSTEMS:   Except as documented, all other systems reviewed and negative     PHYSICAL EXAM:     VITAL SIGNS: 24 HRS MIN & MAX LAST   Temp  Min: 97.9 °F (36.6 °C)  Max: 99.1 °F (37.3 °C) 99.1 °F (37.3 °C)   BP  Min: 95/63  Max: 143/64 138/64   Pulse  Min: 62  Max: 116  92   Resp  Min: 18  Max: 27 20   SpO2  Min: 93 %  Max: 99 % (!) 93 %     General appearance: Well-developed, well-nourished female in no apparent distress.  HENT: Atraumatic head. Moist mucous membranes of oral cavity.  Eyes: Normal extraocular movements.   Neck: Supple.   Lungs:  Mildly labored, bilateral rhonchi, diminished sounds on basally   Heart: Regular rate and rhythm. S1 and S2 present with no murmurs/gallop/rub. No pedal edema. No JVD present.   Abdomen: Soft, non-distended, non-tender. No rebound tenderness/guarding. Bowel sounds are normal.   Extremities: No cyanosis, clubbing, or edema.  Skin: No Rash.   Neuro: Motor and sensory exams grossly intact. Good tone. Muscle strength 5/5 in all 4 extremities  Psych/mental status: Appropriate mood and affect. Responds appropriately to questions.     LABS AND IMAGING:     Recent Labs   Lab 05/07/25  1821 05/08/25  0333   WBC  10.78 9.84   RBC 4.17* 3.70*   HGB 12.7 10.9*   HCT 37.4 33.6*   MCV 89.7 90.8   MCH 30.5 29.5   MCHC 34.0 32.4*   RDW 13.4 13.4    219   MPV 9.3 9.9       Recent Labs   Lab 05/07/25  1821 05/08/25  0333   * 135*   K 3.4* 3.2*    105   CO2 20* 19*   BUN 20.5* 20.5*   CREATININE 1.14* 0.93   * 91   CALCIUM 8.1* 7.9*   MG  --  1.60   ALBUMIN 2.9* 2.5*   PROT 7.5 6.7   ALKPHOS 120 105   ALT 10 10   AST 23 22   BILITOT 2.3* 1.2       Microbiology Results (last 7 days)       Procedure Component Value Units Date/Time    Blood culture #1 **CANNOT BE ORDERED STAT** [[id number]] Collected: 05/07/25 2133    Order Status: Resulted Specimen: Blood Updated: 05/08/25 0024    Blood culture #2 **CANNOT BE ORDERED STAT** [[id number]] Collected: 05/07/25 2133    Order Status: Resulted Specimen: Blood Updated: 05/08/25 0024    Respiratory Culture [[id number]]     Order Status: Sent Specimen: Sputum              CT Cervical Spine Without Contrast  Narrative: EXAMINATION:  CT CERVICAL SPINE WITHOUT CONTRAST    CLINICAL HISTORY:  fall;    TECHNIQUE:  Low dose axial images, sagittal and coronal reformations were performed though the cervical spine. Contrast was not administered. Dose reduction techniques including automatic exposure control (AEC) were utilized.    Dose (DLP): 1408 mGycm    COMPARISON:  None    FINDINGS:  Vertebral column alignment is normal.  Lateral masses of C1 and C2 are congruent.    No acute fracture is demonstrated.  Vertebral body heights are maintained.    Moderate diffuse cervical disc degeneration.  Central canal is patent.  Moderate foraminal stenosis bilaterally at C5-C6 and C6-C7.    Paravertebral soft tissues are normal.  Impression: No acute osseous abnormality.    Multilevel cervical degeneration as detailed above.    No significant discrepancy between preliminary and final reports.    Electronically signed by: Ja Burch  Date:    05/08/2025  Time:    06:59  CT Head Without  Contrast  Narrative: EXAMINATION:  CT HEAD WITHOUT CONTRAST    CLINICAL HISTORY:  fall;    TECHNIQUE:  Low dose axial images were obtained through the head.  Coronal and sagittal reformations were also performed. Contrast was not administered.  Dose reduction techniques including automatic exposure control (AEC) were utilized.    Dose (DLP): 1408 mGycm    COMPARISON:  CT head without contrast 01/12/2023.    FINDINGS:  INTRACRANIAL: Mild chronic small-vessel ischemic changes of the supratentorial white matter.  Mild generalized brain atrophy.  No acute intracranial hemorrhage.  No hydrocephalus.  No intracranial mass effect.    SINUSES: Visualized paranasal sinuses and mastoids are clear.    SKULL/SCALP: Visualized osseous structures are normal.    ORBITS: Bilateral lens replacements.  Impression: No acute intracranial pathology.    Mild generalized brain atrophy and mild chronic small vessel ischemic changes.    I agree with the preliminary report.    Electronically signed by: Ja Burch  Date:    05/08/2025  Time:    06:52      ASSESSMENT & PLAN:     Mechanical fall  Lung opacities seen on CT-?  Community-acquired pneumonia   New onset AFib with RVR at admission  Mild hypokalemia -POA  Borderline hyponatremia-POA  NSTEMI likely type 2 at admission    HX: Obesity, HTN, arthritis, history of breast cancer, chronic COPD, hearing deficits     Plan:   -continue telemetry.  Beta blocker and Eliquis was added by Cardiology today.  Replete potassium.  Maintain adequate potassium magnesium levels.  Cardiology consulted for AFib.  Needs TTE.  Check TSH.  -PT OT eval.  Use analgesics as needed   -continue ceftriaxone azithromycin.  Obtain respiratory PCR, MRSA PCR, respiratory panel, respiratory cultures  -will review the home medications     Eliquis        __________________________________________________________________________  INPATIENT LIST OF MEDICATIONS   Current Medications[1]      Scheduled Meds:   azithromycin   500 mg Intravenous Q24H    cefTRIAXone (Rocephin) IV (PEDS and ADULTS)  2 g Intravenous Q24H     Continuous Infusions:  PRN Meds:.  Current Facility-Administered Medications:     acetaminophen, 650 mg, Oral, Q4H PRN    aluminum-magnesium hydroxide-simethicone, 30 mL, Oral, QID PRN    bisacodyL, 10 mg, Rectal, Daily PRN    dextrose 50%, 12.5 g, Intravenous, PRN    dextrose 50%, 25 g, Intravenous, PRN    glucagon (human recombinant), 1 mg, Intramuscular, PRN    glucose, 16 g, Oral, PRN    glucose, 24 g, Oral, PRN    levalbuterol, 1.25 mg, Nebulization, Q4H PRN    melatonin, 6 mg, Oral, Nightly PRN    polyethylene glycol, 17 g, Oral, BID PRN    sodium chloride 0.9%, 10 mL, Intravenous, PRN      Johan Bonilla MD   05/08/2025     Screening for Social Drivers for health:  Patient screened for food insecurity, housing instability, transportation needs, utility difficulties, and interpersonal safety (select all that apply as identified as concern)  []Housing or Food  []Transportation Needs  []Utility Difficulties  []Interpersonal safety  [x]None         [1]   Current Facility-Administered Medications:     acetaminophen tablet 650 mg, 650 mg, Oral, Q4H PRN, [place], Elizabeth, FNP    aluminum-magnesium hydroxide-simethicone 200-200-20 mg/5 mL suspension 30 mL, 30 mL, Oral, QID PRN, [place], Elizabeth, FNP    azithromycin (ZITHROMAX) 500 mg in 0.9% NaCl 250 mL IVPB (admixture device), 500 mg, Intravenous, Q24H, [place], Elizabeth, FNP    bisacodyL suppository 10 mg, 10 mg, Rectal, Daily PRN, Claudy, Elizabeth, FNP    cefTRIAXone injection 2 g, 2 g, Intravenous, Q24H, Claudy, Elizabeth, FNP    dextrose 50% injection 12.5 g, 12.5 g, Intravenous, PRN, [place], Elizabeth, FNP    dextrose 50% injection 25 g, 25 g, Intravenous, PRN, Claudy, Elizabeth, FNP    glucagon (human recombinant) injection 1 mg, 1 mg, Intramuscular, PRN, [place], Elizabeth, LEONIDAS    glucose chewable tablet 16 g, 16 g, Oral, Claudy PEÑALOZA Alyssa, LEONIDAS    glucose chewable tablet  24 g, 24 g, Oral, PRN, Elizabeth Loco, FNP    levalbuterol nebulizer solution 1.25 mg, 1.25 mg, Nebulization, Q4H PRN, Elizabeth Loco, PADMINIP    melatonin tablet 6 mg, 6 mg, Oral, Nightly PRN, Elizabeth Loco, PADMINIP    polyethylene glycol packet 17 g, 17 g, Oral, BID PRN, Elizabeth Loco, PADMINIP    sodium chloride 0.9% flush 10 mL, 10 mL, Intravenous, PRN, Elizabeth Loco, FNP

## 2025-05-09 VITALS
OXYGEN SATURATION: 96 % | DIASTOLIC BLOOD PRESSURE: 79 MMHG | HEART RATE: 78 BPM | BODY MASS INDEX: 37.49 KG/M2 | RESPIRATION RATE: 18 BRPM | TEMPERATURE: 98 F | WEIGHT: 225 LBS | HEIGHT: 65 IN | SYSTOLIC BLOOD PRESSURE: 127 MMHG
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
## 2025-05-09 LAB
ALBUMIN SERPL-MCNC: 2.6 G/DL (ref 3.4–4.8)
ALBUMIN/GLOB SERPL: 0.7 RATIO (ref 1.1–2)
ALP SERPL-CCNC: 102 UNIT/L (ref 40–150)
ALT SERPL-CCNC: 12 UNIT/L (ref 0–55)
ANION GAP SERPL CALC-SCNC: 12 MEQ/L
AST SERPL-CCNC: 29 UNIT/L (ref 11–45)
BASOPHILS # BLD AUTO: 0.05 X10(3)/MCL
BASOPHILS NFR BLD AUTO: 0.7 %
BILIRUB SERPL-MCNC: 0.9 MG/DL
BUN SERPL-MCNC: 18.2 MG/DL (ref 9.8–20.1)
CALCIUM SERPL-MCNC: 7.8 MG/DL (ref 8.4–10.2)
CHLORIDE SERPL-SCNC: 104 MMOL/L (ref 98–111)
CO2 SERPL-SCNC: 23 MMOL/L (ref 23–31)
CREAT SERPL-MCNC: 0.9 MG/DL (ref 0.55–1.02)
CREAT/UREA NIT SERPL: 20
EOSINOPHIL # BLD AUTO: 0.35 X10(3)/MCL (ref 0–0.9)
EOSINOPHIL NFR BLD AUTO: 4.6 %
ERYTHROCYTE [DISTWIDTH] IN BLOOD BY AUTOMATED COUNT: 13.5 % (ref 11.5–17)
GFR SERPLBLD CREATININE-BSD FMLA CKD-EPI: 60 ML/MIN/1.73/M2
GLOBULIN SER-MCNC: 3.6 GM/DL (ref 2.4–3.5)
GLUCOSE SERPL-MCNC: 103 MG/DL (ref 75–121)
HCT VFR BLD AUTO: 32.9 % (ref 37–47)
HGB BLD-MCNC: 11.1 G/DL (ref 12–16)
IMM GRANULOCYTES # BLD AUTO: 0.08 X10(3)/MCL (ref 0–0.04)
IMM GRANULOCYTES NFR BLD AUTO: 1 %
LYMPHOCYTES # BLD AUTO: 0.89 X10(3)/MCL (ref 0.6–4.6)
LYMPHOCYTES NFR BLD AUTO: 11.6 %
MAGNESIUM SERPL-MCNC: 1.7 MG/DL (ref 1.6–2.6)
MCH RBC QN AUTO: 29.8 PG (ref 27–31)
MCHC RBC AUTO-ENTMCNC: 33.7 G/DL (ref 33–36)
MCV RBC AUTO: 88.4 FL (ref 80–94)
MONOCYTES # BLD AUTO: 0.72 X10(3)/MCL (ref 0.1–1.3)
MONOCYTES NFR BLD AUTO: 9.4 %
NEUTROPHILS # BLD AUTO: 5.55 X10(3)/MCL (ref 2.1–9.2)
NEUTROPHILS NFR BLD AUTO: 72.7 %
NRBC BLD AUTO-RTO: 0 %
PLATELET # BLD AUTO: 234 X10(3)/MCL (ref 130–400)
PMV BLD AUTO: 9.9 FL (ref 7.4–10.4)
POTASSIUM SERPL-SCNC: 3.2 MMOL/L (ref 3.5–5.1)
PROT SERPL-MCNC: 6.2 GM/DL (ref 5.8–7.6)
RBC # BLD AUTO: 3.72 X10(6)/MCL (ref 4.2–5.4)
SODIUM SERPL-SCNC: 139 MMOL/L (ref 136–145)
TSH SERPL-ACNC: 2.73 UIU/ML (ref 0.35–4.94)
WBC # BLD AUTO: 7.64 X10(3)/MCL (ref 4.5–11.5)

## 2025-05-09 PROCEDURE — 94760 N-INVAS EAR/PLS OXIMETRY 1: CPT

## 2025-05-09 PROCEDURE — 25000003 PHARM REV CODE 250: Performed by: INTERNAL MEDICINE

## 2025-05-09 PROCEDURE — 84443 ASSAY THYROID STIM HORMONE: CPT | Performed by: NURSE PRACTITIONER

## 2025-05-09 PROCEDURE — 85025 COMPLETE CBC W/AUTO DIFF WBC: CPT | Performed by: INTERNAL MEDICINE

## 2025-05-09 PROCEDURE — 99900031 HC PATIENT EDUCATION (STAT)

## 2025-05-09 PROCEDURE — 94640 AIRWAY INHALATION TREATMENT: CPT

## 2025-05-09 PROCEDURE — 63600175 PHARM REV CODE 636 W HCPCS: Performed by: INTERNAL MEDICINE

## 2025-05-09 PROCEDURE — 99900035 HC TECH TIME PER 15 MIN (STAT)

## 2025-05-09 PROCEDURE — 36415 COLL VENOUS BLD VENIPUNCTURE: CPT | Performed by: INTERNAL MEDICINE

## 2025-05-09 PROCEDURE — 25000003 PHARM REV CODE 250: Performed by: NURSE PRACTITIONER

## 2025-05-09 PROCEDURE — 25000242 PHARM REV CODE 250 ALT 637 W/ HCPCS: Performed by: STUDENT IN AN ORGANIZED HEALTH CARE EDUCATION/TRAINING PROGRAM

## 2025-05-09 PROCEDURE — 83735 ASSAY OF MAGNESIUM: CPT | Performed by: INTERNAL MEDICINE

## 2025-05-09 PROCEDURE — 80053 COMPREHEN METABOLIC PANEL: CPT | Performed by: INTERNAL MEDICINE

## 2025-05-09 RX ORDER — POTASSIUM CHLORIDE 20 MEQ/1
20 TABLET, EXTENDED RELEASE ORAL ONCE
Status: COMPLETED | OUTPATIENT
Start: 2025-05-09 | End: 2025-05-09

## 2025-05-09 RX ORDER — AZITHROMYCIN 250 MG/1
TABLET, FILM COATED ORAL
Qty: 2 TABLET | Refills: 0 | Status: SHIPPED | OUTPATIENT
Start: 2025-05-10 | End: 2025-05-11

## 2025-05-09 RX ORDER — MAGNESIUM SULFATE HEPTAHYDRATE 40 MG/ML
2 INJECTION, SOLUTION INTRAVENOUS ONCE
Status: COMPLETED | OUTPATIENT
Start: 2025-05-09 | End: 2025-05-09

## 2025-05-09 RX ORDER — CEFDINIR 300 MG/1
300 CAPSULE ORAL 2 TIMES DAILY
Qty: 6 CAPSULE | Refills: 0 | Status: SHIPPED | OUTPATIENT
Start: 2025-05-09 | End: 2025-05-12

## 2025-05-09 RX ORDER — IPRATROPIUM BROMIDE AND ALBUTEROL SULFATE 2.5; .5 MG/3ML; MG/3ML
3 SOLUTION RESPIRATORY (INHALATION) EVERY 8 HOURS
Qty: 75 ML | Refills: 0 | Status: SHIPPED | OUTPATIENT
Start: 2025-05-09 | End: 2025-05-17

## 2025-05-09 RX ORDER — METOPROLOL SUCCINATE 25 MG/1
25 TABLET, EXTENDED RELEASE ORAL DAILY
Qty: 90 TABLET | Refills: 3 | Status: SHIPPED | OUTPATIENT
Start: 2025-05-10 | End: 2026-05-10

## 2025-05-09 RX ADMIN — IPRATROPIUM BROMIDE AND ALBUTEROL SULFATE 3 ML: .5; 3 SOLUTION RESPIRATORY (INHALATION) at 01:05

## 2025-05-09 RX ADMIN — MAGNESIUM SULFATE HEPTAHYDRATE 2 G: 40 INJECTION, SOLUTION INTRAVENOUS at 09:05

## 2025-05-09 RX ADMIN — METOPROLOL SUCCINATE 25 MG: 25 TABLET, EXTENDED RELEASE ORAL at 09:05

## 2025-05-09 RX ADMIN — FOLIC ACID 1000 MCG: 1 TABLET ORAL at 09:05

## 2025-05-09 RX ADMIN — CINACALCET HYDROCHLORIDE 30 MG: 30 TABLET, FILM COATED ORAL at 09:05

## 2025-05-09 RX ADMIN — POTASSIUM CHLORIDE 20 MEQ: 1500 TABLET, EXTENDED RELEASE ORAL at 09:05

## 2025-05-09 RX ADMIN — FLUTICASONE FUROATE 1 PUFF: 100 POWDER RESPIRATORY (INHALATION) at 09:05

## 2025-05-09 RX ADMIN — GABAPENTIN 200 MG: 100 CAPSULE ORAL at 09:05

## 2025-05-09 RX ADMIN — Medication 81 MG: at 09:05

## 2025-05-09 RX ADMIN — APIXABAN 5 MG: 5 TABLET, FILM COATED ORAL at 09:05

## 2025-05-09 RX ADMIN — HYPROMELLOSE 2910 1 DROP: 5 SOLUTION/ DROPS OPHTHALMIC at 09:05

## 2025-05-09 RX ADMIN — UMECLIDINIUM BROMIDE AND VILANTEROL TRIFENATATE 1 PUFF: 62.5; 25 POWDER RESPIRATORY (INHALATION) at 09:05

## 2025-05-09 NOTE — DISCHARGE SUMMARY
[facility] Medicine Discharge Summary    Admit Date: 5/7/2025  Discharge Date and Time: 5/9/20259:47 AM  Admitting Physician: HUMBERTO Team  Discharging Physician: Johan Bonilla MD.  Primary Care Physician: Jessika, Primary Doctor  Consults: Cardiology      89-year-old nursing home resident woman with a history of HTN, breast cancer, COPD sent from facility after an unwitnessed fall.  Patient reported that she fell while she was coming out of the shower and denied any syncopal event, dizziness associated with the event.       At presentation she was afebrile, hemodynamically stable and was mildly hypoxic requiring nasal cannula oxygen supplementation.  CT cervical spine, CT head, showed no acute findings.  CT chest showed multilobar consolidative opacities, possibly pneumonia, aortoiliac atherosclerosis, degenerative spine disease, surgical changes of past right hemicolectomy, umbilical hernias containing nonobstructed bowel were seen.     Lab work showed mild hypokalemia, borderline hyponatremia, mildly elevated BUN and serum creatinine at admission.  BUN and serum creatinine has improved since admission.  She also has mild NSTEMI for which Cardiology was consulted and EKG showed AFib with a RVR.  Cardiology was consulted.  Lactate was within normal limits.  She was admitted to hospital medicine.    Cardiology evaluated her and added metoprolol for rate control and Eliquis for stroke prophylaxis.  Heart rate remained stable.  Respiratory culture, respiratory and MRSA PCR were normal.  Scheduled nebulizer treatments and empiric community-acquired pneumonia treatment was continued.  She was doing well on room air at the time of discharge.  TTE showed EF 55-60%.  Diastolic function was not assessed due to AFib.  She will complete few more days of cefdinir and azithromycin as outpatient.  Necessary prescriptions were arranged.  She will benefit from scheduled nebulizer  treatments.        Mechanical fall  Lung opacities seen on CT-?  Community-acquired pneumonia   New onset AFib with RVR at admission  NSTEMI type 2 from demand ischemia at admission     HX: Obesity, HTN, arthritis, history of breast cancer, chronic COPD, hearing deficits        Pt was seen and examined on the day of discharge  Vitals:  VITAL SIGNS: 24 HRS MIN & MAX LAST   Temp  Min: 97.5 °F (36.4 °C)  Max: 99.6 °F (37.6 °C) 98.6 °F (37 °C)   BP  Min: 92/54  Max: 142/72 (!) 142/72   Pulse  Min: 80  Max: 107  84   Resp  Min: 18  Max: 24 20   SpO2  Min: 93 %  Max: 97 % 97 %     General: Comfortable, not in distress  Respiratory:  Nonlabored breathing, mild expiratory wheezing    bilaterally, nonlabored breathing  Cardiovascular: RRR, S1, S2  Abdominal: Soft, nontender, nondistended  Neurological: AOx4, no focal deficits  Psychiatric: Cooperative          Procedures Performed: No admission procedures for hospital encounter.     Significant Diagnostic Studies: See Full reports for all details    Recent Labs   Lab 05/07/25  1821 05/08/25  0333 05/09/25  0510   WBC 10.78 9.84 7.64   RBC 4.17* 3.70* 3.72*   HGB 12.7 10.9* 11.1*   HCT 37.4 33.6* 32.9*   MCV 89.7 90.8 88.4   MCH 30.5 29.5 29.8   MCHC 34.0 32.4* 33.7   RDW 13.4 13.4 13.5    219 234   MPV 9.3 9.9 9.9       Recent Labs   Lab 05/07/25  1821 05/08/25  0333 05/09/25  0510   * 135* 139   K 3.4* 3.2* 3.2*    105 104   CO2 20* 19* 23   BUN 20.5* 20.5* 18.2   CREATININE 1.14* 0.93 0.90   * 91 103   CALCIUM 8.1* 7.9* 7.8*   MG  --  1.60 1.70   ALBUMIN 2.9* 2.5* 2.6*   PROT 7.5 6.7 6.2   ALKPHOS 120 105 102   ALT 10 10 12   AST 23 22 29   BILITOT 2.3* 1.2 0.9        Microbiology Results (last 7 days)       Procedure Component Value Units Date/Time    Respiratory Culture [[id number]] Collected: 05/08/25 1412    Order Status: Completed Specimen: Sputum Updated: 05/09/25 0818     Respiratory Culture Rare normal respiratory messi     GRAM STAIN  Quality 3+      Moderate Gram positive cocci      Rare Gram Negative Rods      Rare Gram Positive Rods    Blood culture #1 **CANNOT BE ORDERED STAT** [[id number]]  (Normal) Collected: 05/07/25 2133    Order Status: Completed Specimen: Blood Updated: 05/09/25 0120     Blood Culture No Growth At 24 Hours    Blood culture #2 **CANNOT BE ORDERED STAT** [[id number]]  (Normal) Collected: 05/07/25 2133    Order Status: Completed Specimen: Blood Updated: 05/09/25 0120     Blood Culture No Growth At 24 Hours             Echo    Left Ventricle: The left ventricle is dilated. Mildly increased wall   thickness. There is normal systolic function with a visually estimated   ejection fraction of 55 - 60%. Unable to assess diastolic function due to   atrial fibrillation.    Right Ventricle: The right ventricle is normal in size Systolic   function is normal.    Mitral Valve: Mildly calcified leaflets. There is mild regurgitation.    Tricuspid Valve: There is mild regurgitation.    IVC/SVC: Normal venous pressure at 3 mmHg.    Pericardium: There is no pericardial effusion.  CT Chest Abdomen Pelvis With IV Contrast (XPD) NO Oral Contrast  Narrative: EXAMINATION:  CT CHEST ABDOMEN PELVIS WITH IV CONTRAST (XPD)    CLINICAL HISTORY:  trauma;  right side pain    TECHNIQUE:  Helical acquisition with axial, sagittal and coronal reformations obtained from the thoracic inlet to the pubic symphysis following the IV administration of contrast.    Automated tube current modulation, weight-based exposure dosing, and/or iterative reconstruction technique utilized to reach lowest reasonably achievable exposure rate.    DLP: 1409 mGy*cm    COMPARISON:  CT abdomen pelvis 05/22/2018, PET-CT 07/08/2024    FINDINGS:  CHEST:    BASE OF NECK: No significant abnormality.    HEART: Normal size.  Trace pericardial effusion.  There are coronary artery calcifications.    THORACIC VASCULATURE: Aortic atherosclerosis. .Pulmonary arteries enhance  normally.    OPAL/MEDIASTINUM: No enlarged lymph nodes by size criteria.    AIRWAYS: Patent.    LUNGS/PLEURA: There are multilobar consolidative opacities and nodular infiltrates bilaterally.    THORACIC SOFT TISSUES: A 2.9 cm left paraspinal nodule is unchanged compared to prior PET-CT.    ABDOMEN PELVIS:    LIVER: There are incidental hepatic cysts.    BILIARY: Postop cholecystectomy.  Mild biliary ectasia.    PANCREAS: No inflammatory change.    SPLEEN: Normal in size    ADRENALS: Unchanged    KIDNEYS/URETERS: Similar Bosniak 2 F right renal cyst with partially calcified septation.  Tiny exophytic left renal cortical lesion is hyperdense, likely proteinaceous or hemorrhagic cyst.  Is difficult to exclude tiny enhancing nodule.  Unchanged compared to prior PET-CT.  No hydronephrosis.    GI TRACT/MESENTERY: There are postsurgical changes of right hemicolectomy.  There is diastasis of the rectus musculature with bulging of the peritoneum and complex periumbilical and infraumbilical ventral hernias containing nonobstructed bowel.    PERITONEUM: No free fluid.No free air.    ABDOMINOPELVIC LYMPH NODES: No enlarged lymph nodes by size criteria.    ABDOMINOPELVIC VASCULATURE: Aortoiliac atherosclerosis.    BLADDER: Normal appearance given degree of distention.    REPRODUCTIVE ORGANS: Postop hysterectomy.  Unchanged cystic collection at the right adnexa with no perceptible wall.    ABDOMINAL WALL: Ventral hernias as above    BONES: No appreciable fracture.  Scoliosis.  Degenerative change at the spine and imaged joints.  Impression: 1. Multilobar consolidative opacities. Correlate for clinical signs and symptoms of pneumonia.  2. The preliminary and final reports are concordant.    Electronically signed by: Karoline Santiago  Date:    05/08/2025  Time:    08:14  CT Cervical Spine Without Contrast  Narrative: EXAMINATION:  CT CERVICAL SPINE WITHOUT CONTRAST    CLINICAL HISTORY:  fall;    TECHNIQUE:  Low dose axial images,  sagittal and coronal reformations were performed though the cervical spine. Contrast was not administered. Dose reduction techniques including automatic exposure control (AEC) were utilized.    Dose (DLP): 1408 mGycm    COMPARISON:  None    FINDINGS:  Vertebral column alignment is normal.  Lateral masses of C1 and C2 are congruent.    No acute fracture is demonstrated.  Vertebral body heights are maintained.    Moderate diffuse cervical disc degeneration.  Central canal is patent.  Moderate foraminal stenosis bilaterally at C5-C6 and C6-C7.    Paravertebral soft tissues are normal.  Impression: No acute osseous abnormality.    Multilevel cervical degeneration as detailed above.    No significant discrepancy between preliminary and final reports.    Electronically signed by: Ja Burch  Date:    05/08/2025  Time:    06:59  CT Head Without Contrast  Narrative: EXAMINATION:  CT HEAD WITHOUT CONTRAST    CLINICAL HISTORY:  fall;    TECHNIQUE:  Low dose axial images were obtained through the head.  Coronal and sagittal reformations were also performed. Contrast was not administered.  Dose reduction techniques including automatic exposure control (AEC) were utilized.    Dose (DLP): 1408 mGycm    COMPARISON:  CT head without contrast 01/12/2023.    FINDINGS:  INTRACRANIAL: Mild chronic small-vessel ischemic changes of the supratentorial white matter.  Mild generalized brain atrophy.  No acute intracranial hemorrhage.  No hydrocephalus.  No intracranial mass effect.    SINUSES: Visualized paranasal sinuses and mastoids are clear.    SKULL/SCALP: Visualized osseous structures are normal.    ORBITS: Bilateral lens replacements.  Impression: No acute intracranial pathology.    Mild generalized brain atrophy and mild chronic small vessel ischemic changes.    I agree with the preliminary report.    Electronically signed by: Ja Burch  Date:    05/08/2025  Time:    06:52         Medication List        START taking these  medications      albuterol-ipratropium 2.5 mg-0.5 mg/3 mL nebulizer solution  Commonly known as: DUO-NEB  Take 3 mLs by nebulization every 8 (eight) hours. Rescue for 8 days     apixaban 5 mg Tab  Commonly known as: ELIQUIS  Take 1 tablet (5 mg total) by mouth 2 (two) times daily.     azithromycin 250 MG tablet  Commonly known as: Z-KARLA  Take 2 tablets by mouth on day 1; Take 1 tablet by mouth on days 2-5  Start taking on: May 10, 2025     cefdinir 300 MG capsule  Commonly known as: OMNICEF  Take 1 capsule (300 mg total) by mouth 2 (two) times daily. for 3 days     metoprolol succinate 25 MG 24 hr tablet  Commonly known as: TOPROL-XL  Take 1 tablet (25 mg total) by mouth once daily.  Start taking on: May 10, 2025            CONTINUE taking these medications      aspirin 81 MG EC tablet  Commonly known as: ECOTRIN     cholecalciferol (vitamin D3) 125 mcg (5,000 unit) Tbdl     cinacalcet 30 MG Tab  Commonly known as: SENSIPAR     folic acid 1 MG tablet  Commonly known as: FOLVITE     * gabapentin 600 MG tablet  Commonly known as: NEURONTIN     * gabapentin 300 MG capsule  Commonly known as: NEURONTIN     methotrexate 2.5 MG Tab     polyvinyl alcohol (artificial tears) 1.4 % ophthalmic solution  Commonly known as: LIQUIFILM TEARS     PROAIR RESPICLICK 90 mcg/actuation inhaler  Generic drug: albuterol sulfate     TRELEGY ELLIPTA 100-62.5-25 mcg Dsdv  Generic drug: fluticasone-umeclidin-vilanter           * This list has 2 medication(s) that are the same as other medications prescribed for you. Read the directions carefully, and ask your doctor or other care provider to review them with you.                   Where to Get Your Medications        These medications were sent to [facility]  [address] [place] [address]      Phone: [phone]   albuterol-ipratropium 2.5 mg-0.5 mg/3 mL nebulizer solution  apixaban 5 mg Tab  azithromycin 250 MG tablet  cefdinir 300 MG  capsule  metoprolol succinate 25 MG 24 hr tablet          Explained in detail to the patient about the discharge plan, medications, and follow-up visits. Pt understands and agrees with the treatment plan  Discharge Disposition: Home or Self Care   Discharged Condition: stable  Diet-   Dietary Orders (From admission, onward)       Start     Ordered    05/07/25 2046  Diet Adult Regular Standard Tray  (Diet/Nutrition - [facility] Locations)  Diet effective now        Question:  Tray type:  Answer:  Standard Tray    05/07/25 2046                   Medications Per DC med rec  Activities as tolerated   Follow-up Information       No, Primary Doctor Follow up in 1 week(s).                           For further questions contact hospitalist office    Discharge time 33 minutes    For worsening symptoms, chest pain, shortness of breath, increased abdominal pain, high grade fever, stroke or stroke like symptoms, immediately go to the nearest Emergency Room or call 911 as soon as possible.      Johan Membreno M.D, on 5/9/2025. at 9:47 AM.

## 2025-05-09 NOTE — PROGRESS NOTES
Consults  [facility]    Cardiology  Consult Note    Patient Name: Margo Mishra  MRN: [id number]  Admission Date: 5/7/2025  Hospital Length of Stay: 2 days  Code Status: DNR   Attending Provider: Reyes, Thairy G, DO   Consulting Provider: LEONIDAS Mueller  Primary Care Physician: Jessika, Primary Doctor  Principal Problem:Fall    Patient information was obtained from patient and ER records.     Subjective:     Chief Complaint/Reason for Consult: New onset atrial fibrillation    HPI:   Ms. Margo Mishra is a 89 year old known to Dr. Goldstein (2017), who presented to [facility] on 5.7.25 after an unwitnessed fall out of the shower. She has a history of HTN, arthritis, breast cancer and COPD. Upon workup CT of the abd/pelvis revealed a small pericardial effusion, consolidation in the right middle and left upper lobe, ground glass opacities, pleural thickening. EKG revealed Atrial fibrillation. [facility] is being consulted for new onset atrial fibrillation.    5.9.25 Echo 5.8.25 EF 55-60%. She stated she feels better and is going home.      PMH: HTN, arthritis, breast cancer and COPD.   PSH: s/p colon resection, cataract surgery, hysterectomy, appendectomy, tonsillectomy  Family History: Father-cancer, daughter-cancer  Social History: Ex smoker, denied alcohol and drug use    Previous Cardiac Diagnostics:   Echocardiogram 6/17  EF 65%, LVH, mod TR        Past Medical History:   Diagnosis Date    Hypertension      Past Surgical History:   Procedure Laterality Date    HYSTERECTOMY      comp hyst at age 38    OOPHORECTOMY      comp hyst at age 38     Review of patient's allergies indicates:   Allergen Reactions    Codeine      Other reaction(s): Agitation, Confusion     No current facility-administered medications on file prior to encounter.     Current Outpatient Medications on File Prior to Encounter   Medication Sig    aspirin (ECOTRIN) 81 MG EC tablet Take 81 mg by mouth once daily.    cholecalciferol,  vitamin D3, 125 mcg (5,000 unit) TbDL Take 1 tablet by mouth Daily.    cinacalcet (SENSIPAR) 30 MG Tab Take 30 mg by mouth daily with breakfast.    folic acid (FOLVITE) 1 MG tablet Take 1,000 mcg by mouth once daily.    gabapentin (NEURONTIN) 300 MG capsule Take 300 mg by mouth Daily.    gabapentin (NEURONTIN) 600 MG tablet Take 600 mg by mouth every evening.    methotrexate 2.5 MG Tab Take 2.5 mg by mouth every 7 days.    polyvinyl alcohol, artificial tears, (LIQUIFILM TEARS) 1.4 % ophthalmic solution Place 1 drop into both eyes 2 (two) times a day.    PROAIR RESPICLICK 90 mcg/actuation inhaler 1 puff every 4 (four) hours as needed.    TRELEGY ELLIPTA 100-62.5-25 mcg DsDv Take 1 puff by mouth once daily.     Family History    None       Tobacco Use    Smoking status: Former     Types: Cigarettes    Smokeless tobacco: Never   Substance and Sexual Activity    Alcohol use: Not Currently    Drug use: Not Currently    Sexual activity: Not on file       Review of Systems   Constitutional:  Positive for fatigue.   Cardiovascular: Negative.    All other systems reviewed and are negative.    Objective:     Vital Signs (Most Recent):  Temp: 97.9 °F (36.6 °C) (05/09/25 1100)  Pulse: 78 (05/09/25 1100)  Resp: 18 (05/09/25 1100)  BP: 127/79 (05/09/25 1100)  SpO2: 96 % (05/09/25 1100) Vital Signs (24h Range):  Temp:  [97.5 °F (36.4 °C)-99.6 °F (37.6 °C)] 97.9 °F (36.6 °C)  Pulse:  [] 78  Resp:  [18-24] 18  SpO2:  [93 %-97 %] 96 %  BP: ()/(54-79) 127/79   Weight: 102.1 kg (225 lb)  Body mass index is 37.44 kg/m².  SpO2: 96 %       Intake/Output Summary (Last 24 hours) at 5/9/2025 1233  Last data filed at 5/8/2025 1426  Gross per 24 hour   Intake 360 ml   Output 201 ml   Net 159 ml     Lines/Drains/Airways       Peripheral Intravenous Line  Duration                  Peripheral IV - Single Lumen 05/07/25 1750 20 G Anterior;Right Antecubital 1 day                  Significant Labs:   Chemistries:   Recent Labs   Lab  "05/07/25  1821 05/07/25  2133 05/08/25  0333 05/08/25  0922 05/09/25  0510   *  --  135*  --  139   K 3.4*  --  3.2*  --  3.2*     --  105  --  104   CO2 20*  --  19*  --  23   BUN 20.5*  --  20.5*  --  18.2   CREATININE 1.14*  --  0.93  --  0.90   CALCIUM 8.1*  --  7.9*  --  7.8*   PROT 7.5  --  6.7  --  6.2   BILITOT 2.3*  --  1.2  --  0.9   ALKPHOS 120  --  105  --  102   ALT 10  --  10  --  12   AST 23  --  22  --  29   MG  --   --  1.60  --  1.70   TROPONINI  --  0.051* 0.053* 0.046*  --         CBC/Anemia Labs: Coags:    Recent Labs   Lab 05/07/25 1821 05/08/25 0333 05/09/25  0510   WBC 10.78 9.84 7.64   HGB 12.7 10.9* 11.1*   HCT 37.4 33.6* 32.9*    219 234   MCV 89.7 90.8 88.4   RDW 13.4 13.4 13.5    No results for input(s): "PT", "INR", "APTT" in the last 168 hours.     Significant Imaging:  Imaging Results              CT Chest Abdomen Pelvis With IV Contrast (XPD) NO Oral Contrast (Final result)  Result time 05/08/25 08:14:49      Final result by Karoline Santiago MD (05/08/25 08:14:49)                   Impression:      1. Multilobar consolidative opacities. Correlate for clinical signs and symptoms of pneumonia.  2. The preliminary and final reports are concordant.      Electronically signed by: Karoline Santiago  Date:    05/08/2025  Time:    08:14               Narrative:    EXAMINATION:  CT CHEST ABDOMEN PELVIS WITH IV CONTRAST (XPD)    CLINICAL HISTORY:  trauma;  right side pain    TECHNIQUE:  Helical acquisition with axial, sagittal and coronal reformations obtained from the thoracic inlet to the pubic symphysis following the IV administration of contrast.    Automated tube current modulation, weight-based exposure dosing, and/or iterative reconstruction technique utilized to reach lowest reasonably achievable exposure rate.    DLP: 1409 mGy*cm    COMPARISON:  CT abdomen pelvis 05/22/2018, PET-CT 07/08/2024    FINDINGS:  CHEST:      BASE OF NECK: No significant " abnormality.    HEART: Normal size.  Trace pericardial effusion.  There are coronary artery calcifications.    THORACIC VASCULATURE: Aortic atherosclerosis. .Pulmonary arteries enhance normally.    OPAL/MEDIASTINUM: No enlarged lymph nodes by size criteria.    AIRWAYS: Patent.    LUNGS/PLEURA: There are multilobar consolidative opacities and nodular infiltrates bilaterally.    THORACIC SOFT TISSUES: A 2.9 cm left paraspinal nodule is unchanged compared to prior PET-CT.    ABDOMEN PELVIS:    LIVER: There are incidental hepatic cysts.    BILIARY: Postop cholecystectomy.  Mild biliary ectasia.    PANCREAS: No inflammatory change.    SPLEEN: Normal in size    ADRENALS: Unchanged    KIDNEYS/URETERS: Similar Bosniak 2 F right renal cyst with partially calcified septation.  Tiny exophytic left renal cortical lesion is hyperdense, likely proteinaceous or hemorrhagic cyst.  Is difficult to exclude tiny enhancing nodule.  Unchanged compared to prior PET-CT.  No hydronephrosis.    GI TRACT/MESENTERY: There are postsurgical changes of right hemicolectomy.  There is diastasis of the rectus musculature with bulging of the peritoneum and complex periumbilical and infraumbilical ventral hernias containing nonobstructed bowel.    PERITONEUM: No free fluid.No free air.    ABDOMINOPELVIC LYMPH NODES: No enlarged lymph nodes by size criteria.    ABDOMINOPELVIC VASCULATURE: Aortoiliac atherosclerosis.    BLADDER: Normal appearance given degree of distention.    REPRODUCTIVE ORGANS: Postop hysterectomy.  Unchanged cystic collection at the right adnexa with no perceptible wall.    ABDOMINAL WALL: Ventral hernias as above    BONES: No appreciable fracture.  Scoliosis.  Degenerative change at the spine and imaged joints.                        Preliminary result by Meir Smith Jr., MD (05/07/25 19:56:54)                   Impression:    1. A small pericardial effusion is present.  2. There is consolidation in the right middle lobe  and left upper lobe, likely due to pneumonia. Minimal ground glass and nodular densities measuring up to 0.7 cm are also seen in the right upper lobe. There are minimal ground glass opacities in the basal segments of the bilateral lower lobes. These findings are also likely due to an infectious process.  3. There is somewhat nodular pleural thickening in the basal segments of the bilateral lower lobes.  4. No acute traumatic intrathoracic pathology identified. No acute traumatic intraabdominal or pelvic solid organ or bowel pathology identified. Details and findings as discussed above.               Narrative:    START OF REPORT:  Technique: CT Scan of the chest abdomen and pelvis was performed with intravenous contrast with axial as well as sagittal and, coronal images.    Dosage Information: Automated Exposure Control was utilized.    Comparison: Comparison is with chest CT [id number] and CT abdomen dated 05-.    Clinical History: Fall, r side pain.    Findings:  Lines and Tubes: None.  Neck: The visualized soft tissues of the neck appear unremarkable.  Mediastinum: A few subcentimeter mediastinal lymph nodes are seen.  Heart: The heart size is within normal limits. A small pericardial effusion is present.  Aorta: Mild aortic calcification is seen in the thoracic aorta.  Pulmonary Arteries: Unremarkable.  Lungs: There is consolidation in the right middle lobe and left upper lobe, likely due to pneumonia. Minimal ground glass and nodular densities measuring up to 0.7 cm are also seen in the right upper lobe. There are minimal ground glass opacities in the basal segments of the bilateral lower lobes.  Pleura: No effusions or pneumothorax are identified. There is somewhat nodular pleural thickening in the basal segments of the bilateral lower lobes.  Bony Structures:  Spine: Moderate spondylolytic changes are seen in the thoracic spine. Dextroconvex scoliosis of the thoracic spine.  Ribs: No rib fractures  are identified.  Abdomen:  Abdominal Wall: There is a medium sized umbilical hernia which contains a portion of the transverse colon, without evidence of strangulation. Another large anterior wall hernia is seen in the infraumbilical area measuring 9.0 x 16.2 cm (AP x width), with herniation of some small bowel loops, without evidence of strangulation.  Liver: There are stable-appearing cysts in the left hepatic lobe, measuring up to 1.2 cm.  Biliary System: The intrahepatic and extra hepatic biliary system appears prominent which may reflect prior obstructive physiology in this patient status post cholecystectomy.  Gallbladder: Surgical clips are seen in the gallbladder fossa consistent with prior cholecystectomy.  Pancreas: Moderate pancreatic atrophy is seen.  Spleen: The spleen appears unremarkable.  Adrenals: The left adrenal gland appears prominent with a 0.8 cm hypodense nodule in the genu, probably an adenoma. The right adrenal gland is unremarkable.  Kidneys: The left kidney appears unremarkable with no stones cysts masses or hydronephrosis. There is no change in the appearance of the lobulated cyst with few septa and tin rim calcification in the lower pole of the right kidney, presently measuring 2.6 x 1.6 cm. The right kidney is otherwise unremarkable without stones, masses or hydronephrosis.  Aorta: There is moderate calcification of the abdominal aorta and its branches.  IVC: Unremarkable.  Bowel:  Esophagus: The visualized distal esophagus appears unremarkable.  Stomach: The stomach appears unremarkable.  Duodenum: Unremarkable appearing duodenum.  Small Bowel: The small bowel appears unremarkable.  Colon: Nondistended.  Appendix: The appendix is not identified but no inflammatory changes are seen in the right lower quadrant to suggest appendicitis.    Pelvis:  Bladder: The bladder appears unremarkable.  Female:  Uterus: The uterus is not identified. There is no change in the 5.4 cm cystic structure in  the pelvic fossa, probably related to previous post-procedural changes.    Bony structures:  Dorsal Spine: There is moderate spondylosis of the visualized dorsal spine. Lumbar levoscoliosis is noted.  Bony Pelvis: There are bone islands in the right sacrum and left iliac bone. Right hip osteoarthritis is noted.                                         CT Head Without Contrast (Final result)  Result time 05/08/25 06:52:31      Final result by Ja Burch MD (05/08/25 06:52:31)                   Impression:      No acute intracranial pathology.    Mild generalized brain atrophy and mild chronic small vessel ischemic changes.    I agree with the preliminary report.      Electronically signed by: Ja Burch  Date:    05/08/2025  Time:    06:52               Narrative:    EXAMINATION:  CT HEAD WITHOUT CONTRAST    CLINICAL HISTORY:  fall;    TECHNIQUE:  Low dose axial images were obtained through the head.  Coronal and sagittal reformations were also performed. Contrast was not administered.  Dose reduction techniques including automatic exposure control (AEC) were utilized.    Dose (DLP): 1408 mGycm    COMPARISON:  CT head without contrast 01/12/2023.    FINDINGS:  INTRACRANIAL: Mild chronic small-vessel ischemic changes of the supratentorial white matter.  Mild generalized brain atrophy.  No acute intracranial hemorrhage.  No hydrocephalus.  No intracranial mass effect.    SINUSES: Visualized paranasal sinuses and mastoids are clear.    SKULL/SCALP: Visualized osseous structures are normal.    ORBITS: Bilateral lens replacements.                        Preliminary result by Meir Smith Jr., MD (05/07/25 19:33:36)                   Impression:    1. No acute intracranial traumatic injury identified. Details and other findings as noted above.               Narrative:    START OF REPORT:  Technique: CT of the head was performed without intravenous contrast with axial as well as coronal and sagittal  images.    Comparison: Comparison is with study [id number] 09:05:56.    Dosage Information: Automated exposure control was utilized.    Clinical history: Fall, r side pain.    Findings:  Hemorrhage: No acute intracranial hemorrhage is seen.  CSF spaces: The ventricles, sulci and basal cisterns all appear mildly prominent consistent with global cerebral atrophy. This finding is stable since the prior study.  Brain parenchyma: There is preservation of the grey white junction throughout. No acute infarct is identified. Mild microvascular change is seen in portions of the periventricular and deep white matter tracts.  Cerebellum: Unremarkable.  Vascular: Unremarkable venous sinuses. Stable appearing atheromatous calcification of the intracranial arteries is seen.  Sella and skull base: The sella appears to be within normal limits for age.  Intracranial calcifications: Incidental note is made of bilateral choroid plexus calcification. Incidental note is made of some pineal region calcification. Incidental note is made of some calcification of the falx.  Calvarium: No acute linear or depressed skull fracture is seen.    Maxillofacial Structures:  Paranasal sinuses: There is some air fluid levels in the left maxillary sinuses.  Orbits: The orbits appear unremarkable.  Temporal bones and mastoids: The temporal bones and mastoids appear unremarkable.  TMJ: The mandibular condyles appear normally placed with respect to the mandibular fossa.  Nasal Bones: The nasal septum is midline. No displaced nasal bone fracture is seen.    Visualized upper cervical spine: The visualized cervical spine appears unremarkable.                                         CT Cervical Spine Without Contrast (Final result)  Result time 05/08/25 06:59:01      Final result by Ja Burch MD (05/08/25 06:59:01)                   Impression:      No acute osseous abnormality.    Multilevel cervical degeneration as detailed above.    No  significant discrepancy between preliminary and final reports.      Electronically signed by: Ja Burch  Date:    05/08/2025  Time:    06:59               Narrative:    EXAMINATION:  CT CERVICAL SPINE WITHOUT CONTRAST    CLINICAL HISTORY:  fall;    TECHNIQUE:  Low dose axial images, sagittal and coronal reformations were performed though the cervical spine. Contrast was not administered. Dose reduction techniques including automatic exposure control (AEC) were utilized.    Dose (DLP): 1408 mGycm    COMPARISON:  None    FINDINGS:  Vertebral column alignment is normal.  Lateral masses of C1 and C2 are congruent.    No acute fracture is demonstrated.  Vertebral body heights are maintained.    Moderate diffuse cervical disc degeneration.  Central canal is patent.  Moderate foraminal stenosis bilaterally at C5-C6 and C6-C7.    Paravertebral soft tissues are normal.                        Preliminary result by Meir Smith Jr., MD (05/07/25 19:31:01)                   Impression:    1. No acute cervical spine fracture dislocation or subluxation is seen.  2. Degenerative changes and other details as above.               Narrative:    START OF REPORT:  Technique: CT of the cervical spine was performed without intravenous contrast with axial as well as sagittal and coronal images.    Comparison: None.    Dosage Information: Automated exposure control was utilized.    Clinical history: Fall, r side pain.    Findings:  Lung apices: The visualized lung apices appear unremarkable.  Spine:  Spinal canal: The spinal canal appears unremarkable.  Spinal cord: The spinal cord appears unremarkable.  Mineralization: Mild osteopenia is seen in the visualized bony structures of the cervical spine.  Rotation: No significant rotation is seen.  Scoliosis: No significant scoliosis is seen.  Vertebral Fusion: No vertebral fusion is identified.  Listhesis: No significant listhesis is identified.  Lordosis: Moderate straightening of  the cervical lordosis is seen. This may be positional or reflect an element of myospasm.  Intervertebral disc spaces: Multilevel loss of disc height is seen.  Osteophytes: Mild multilevel endplate osteophytes are seen.  Uncovertebral degenerative changes: Mild multilevel uncovertebral joint arthrosis is seen.  Facet degenerative changes: Mild to moderate multilevel facet degenerative changes are seen.  Fractures: No acute cervical spine fracture dislocation or subluxation is seen.  Orthopedic Hardware: None.    Miscellaneous:  Mastoid air cells: The visualized mastoid air cells appear clear.  Soft Tissues: Unremarkable.                                      EKG:     Telemetry:  AF    Physical Exam  Vitals reviewed.   Constitutional:       Appearance: Normal appearance.   Cardiovascular:      Rate and Rhythm: Normal rate. Rhythm irregular.      Pulses: Normal pulses.      Heart sounds: Normal heart sounds.   Pulmonary:      Effort: Pulmonary effort is normal.      Breath sounds: Normal breath sounds.   Abdominal:      General: Abdomen is flat. Bowel sounds are normal.   Skin:     General: Skin is warm and dry.      Capillary Refill: Capillary refill takes less than 2 seconds.   Neurological:      General: No focal deficit present.      Mental Status: She is alert.       Home Medications:   Medications Ordered Prior to Encounter[1]  Current Schedule Inpatient Medications:   albuterol-ipratropium  3 mL Nebulization Q6H    apixaban  5 mg Oral BID    aspirin  81 mg Oral Daily    azithromycin  500 mg Intravenous Q24H    cefTRIAXone (Rocephin) IV (PEDS and ADULTS)  2 g Intravenous Q24H    cinacalcet  30 mg Oral Daily with breakfast    umeclidinium-vilanteroL  1 puff Inhalation Daily    And    fluticasone furoate  1 puff Inhalation Daily    folic acid  1,000 mcg Oral Daily    gabapentin  200 mg Oral BID    metoprolol succinate  25 mg Oral Daily     Continuous Infusions:    Assessment:     New onset atrial fibrillation  Chads  4  NSTEMI probable type II in the setting of pneumonia  Pneumonia  HTN  Arthritis  Breast cancer  COPD.     Plan:     Continue eliquis 5 mg po bid  Continue toprol xl 25 mg po daily  Continue home meds  Antibiotics per primary  Follow up with Dr Goldstein in 1-2 weeks.    LEONIDAS Mueller  Cardiology  [facility]        [1]   No current facility-administered medications on file prior to encounter.     Current Outpatient Medications on File Prior to Encounter   Medication Sig Dispense Refill    aspirin (ECOTRIN) 81 MG EC tablet Take 81 mg by mouth once daily.      cholecalciferol, vitamin D3, 125 mcg (5,000 unit) TbDL Take 1 tablet by mouth Daily.      cinacalcet (SENSIPAR) 30 MG Tab Take 30 mg by mouth daily with breakfast.      folic acid (FOLVITE) 1 MG tablet Take 1,000 mcg by mouth once daily.      gabapentin (NEURONTIN) 300 MG capsule Take 300 mg by mouth Daily.      gabapentin (NEURONTIN) 600 MG tablet Take 600 mg by mouth every evening.      methotrexate 2.5 MG Tab Take 2.5 mg by mouth every 7 days.      polyvinyl alcohol, artificial tears, (LIQUIFILM TEARS) 1.4 % ophthalmic solution Place 1 drop into both eyes 2 (two) times a day.      PROAIR RESPICLICK 90 mcg/actuation inhaler 1 puff every 4 (four) hours as needed.      TRELEGY ELLIPTA 100-62.5-25 mcg DsDv Take 1 puff by mouth once daily.

## 2025-05-09 NOTE — PLAN OF CARE
SSC called Samantha Hunt at [facility] to make her aware of afternoon discharge. She was in her morning meeting, message left with Thomas Clerk.

## 2025-05-09 NOTE — PLAN OF CARE
05/09/25 1106   Final Note   Assessment Type Final Discharge Note   Anticipated Discharge Disposition [facility]   Post-Acute Status   Discharge Delays None known at this time     Patient is a resident at [facility] and  will return to facility today. Dc packet and report information given to nurse. NH van will provide transportation around 2pm today.

## 2025-05-09 NOTE — PROGRESS NOTES
IV was d/c'd with no complications noted. She had no questions or concerns about her discharge. Pt left in a wheelchair with the facility  to her NH.

## 2025-05-11 LAB
BACTERIA SPEC CULT: ABNORMAL
GRAM STN SPEC: ABNORMAL

## 2025-05-13 ENCOUNTER — LAB REQUISITION (OUTPATIENT)
Dept: LAB | Facility: HOSPITAL | Age: OVER 89
End: 2025-05-13
Payer: MEDICARE

## 2025-05-13 DIAGNOSIS — M06.9 RHEUMATOID ARTHRITIS, UNSPECIFIED: ICD-10-CM

## 2025-05-13 DIAGNOSIS — E21.3 HYPERPARATHYROIDISM, UNSPECIFIED: ICD-10-CM

## 2025-05-13 DIAGNOSIS — D64.9 ANEMIA, UNSPECIFIED: ICD-10-CM

## 2025-05-13 DIAGNOSIS — E56.9 VITAMIN DEFICIENCY, UNSPECIFIED: ICD-10-CM

## 2025-05-13 LAB
25(OH)D3+25(OH)D2 SERPL-MCNC: 31 NG/ML (ref 30–80)
ALBUMIN SERPL-MCNC: 2.5 G/DL (ref 3.4–4.8)
ALBUMIN/GLOB SERPL: 0.9 RATIO (ref 1.1–2)
ALP SERPL-CCNC: 78 UNIT/L (ref 40–150)
ALT SERPL-CCNC: 12 UNIT/L (ref 0–55)
ANION GAP SERPL CALC-SCNC: 7 MEQ/L
AST SERPL-CCNC: 17 UNIT/L (ref 11–45)
BACTERIA BLD CULT: NORMAL
BACTERIA BLD CULT: NORMAL
BASOPHILS # BLD AUTO: 0.07 X10(3)/MCL
BASOPHILS NFR BLD AUTO: 1.6 %
BILIRUB SERPL-MCNC: 0.6 MG/DL
BUN SERPL-MCNC: 10 MG/DL (ref 9.8–20.1)
CALCIUM SERPL-MCNC: 7.9 MG/DL (ref 8.4–10.2)
CHLORIDE SERPL-SCNC: 111 MMOL/L (ref 98–111)
CHOLEST SERPL-MCNC: 87 MG/DL
CHOLEST/HDLC SERPL: 3 {RATIO} (ref 0–5)
CO2 SERPL-SCNC: 26 MMOL/L (ref 23–31)
CREAT SERPL-MCNC: 0.79 MG/DL (ref 0.55–1.02)
CREAT/UREA NIT SERPL: 13
EOSINOPHIL # BLD AUTO: 0.31 X10(3)/MCL (ref 0–0.9)
EOSINOPHIL NFR BLD AUTO: 7.1 %
ERYTHROCYTE [DISTWIDTH] IN BLOOD BY AUTOMATED COUNT: 13.3 % (ref 11.5–17)
GFR SERPLBLD CREATININE-BSD FMLA CKD-EPI: >60 ML/MIN/1.73/M2
GLOBULIN SER-MCNC: 2.9 GM/DL (ref 2.4–3.5)
GLUCOSE SERPL-MCNC: 108 MG/DL (ref 75–121)
HCT VFR BLD AUTO: 32.1 % (ref 37–47)
HDLC SERPL-MCNC: 25 MG/DL (ref 35–60)
HGB BLD-MCNC: 10.7 G/DL (ref 12–16)
IMM GRANULOCYTES # BLD AUTO: 0.2 X10(3)/MCL (ref 0–0.04)
IMM GRANULOCYTES NFR BLD AUTO: 4.6 %
LDLC SERPL CALC-MCNC: 48 MG/DL (ref 50–140)
LYMPHOCYTES # BLD AUTO: 0.79 X10(3)/MCL (ref 0.6–4.6)
LYMPHOCYTES NFR BLD AUTO: 18 %
MCH RBC QN AUTO: 29.9 PG (ref 27–31)
MCHC RBC AUTO-ENTMCNC: 33.3 G/DL (ref 33–36)
MCV RBC AUTO: 89.7 FL (ref 80–94)
MONOCYTES # BLD AUTO: 0.45 X10(3)/MCL (ref 0.1–1.3)
MONOCYTES NFR BLD AUTO: 10.3 %
NEUTROPHILS # BLD AUTO: 2.57 X10(3)/MCL (ref 2.1–9.2)
NEUTROPHILS NFR BLD AUTO: 58.4 %
NRBC BLD AUTO-RTO: 0 %
PLATELET # BLD AUTO: 312 X10(3)/MCL (ref 130–400)
PMV BLD AUTO: 9.6 FL (ref 7.4–10.4)
POTASSIUM SERPL-SCNC: 3.2 MMOL/L (ref 3.5–5.1)
PROT SERPL-MCNC: 5.4 GM/DL (ref 5.8–7.6)
PTH-INTACT SERPL-MCNC: 64.9 PG/ML (ref 8.7–77)
RBC # BLD AUTO: 3.58 X10(6)/MCL (ref 4.2–5.4)
SODIUM SERPL-SCNC: 144 MMOL/L (ref 136–145)
TRIGL SERPL-MCNC: 69 MG/DL (ref 37–140)
TSH SERPL-ACNC: 5.32 UIU/ML (ref 0.35–4.94)
VLDLC SERPL CALC-MCNC: 14 MG/DL
WBC # BLD AUTO: 4.39 X10(3)/MCL (ref 4.5–11.5)

## 2025-05-13 PROCEDURE — 80053 COMPREHEN METABOLIC PANEL: CPT

## 2025-05-13 PROCEDURE — 84443 ASSAY THYROID STIM HORMONE: CPT

## 2025-05-13 PROCEDURE — 85025 COMPLETE CBC W/AUTO DIFF WBC: CPT

## 2025-05-13 PROCEDURE — 83970 ASSAY OF PARATHORMONE: CPT

## 2025-05-13 PROCEDURE — 80061 LIPID PANEL: CPT

## 2025-05-13 PROCEDURE — 82306 VITAMIN D 25 HYDROXY: CPT

## 2025-05-16 ENCOUNTER — PATIENT OUTREACH (OUTPATIENT)
Dept: ADMINISTRATIVE | Facility: CLINIC | Age: OVER 89
End: 2025-05-16
Payer: MEDICARE

## 2025-07-07 DIAGNOSIS — C50.412 MALIGNANT NEOPLASM OF UPPER-OUTER QUADRANT OF LEFT BREAST IN FEMALE, ESTROGEN RECEPTOR POSITIVE: Primary | ICD-10-CM

## 2025-07-07 DIAGNOSIS — Z17.0 MALIGNANT NEOPLASM OF UPPER-OUTER QUADRANT OF LEFT BREAST IN FEMALE, ESTROGEN RECEPTOR POSITIVE: Primary | ICD-10-CM

## 2025-07-11 ENCOUNTER — LAB VISIT (OUTPATIENT)
Dept: LAB | Facility: HOSPITAL | Age: OVER 89
End: 2025-07-11
Attending: INTERNAL MEDICINE
Payer: MEDICARE

## 2025-07-11 DIAGNOSIS — Z17.0 MALIGNANT NEOPLASM OF UPPER-OUTER QUADRANT OF LEFT BREAST IN FEMALE, ESTROGEN RECEPTOR POSITIVE: ICD-10-CM

## 2025-07-11 DIAGNOSIS — C50.412 MALIGNANT NEOPLASM OF UPPER-OUTER QUADRANT OF LEFT BREAST IN FEMALE, ESTROGEN RECEPTOR POSITIVE: ICD-10-CM

## 2025-07-11 DIAGNOSIS — C85.19 B-CELL LYMPHOMA OF EXTRANODAL OR SOLID ORGAN SITE: ICD-10-CM

## 2025-07-11 PROBLEM — R60.9 EDEMA: Status: ACTIVE | Noted: 2025-07-11

## 2025-07-11 LAB
ALBUMIN SERPL-MCNC: 3.3 G/DL (ref 3.4–4.8)
ALBUMIN/GLOB SERPL: 0.8 RATIO (ref 1.1–2)
ALP SERPL-CCNC: 106 UNIT/L (ref 40–150)
ALT SERPL-CCNC: 9 UNIT/L (ref 0–55)
ANION GAP SERPL CALC-SCNC: 9 MEQ/L
AST SERPL-CCNC: 22 UNIT/L (ref 11–45)
BASOPHILS # BLD AUTO: 0.06 X10(3)/MCL
BASOPHILS NFR BLD AUTO: 1.1 %
BILIRUB SERPL-MCNC: 1.3 MG/DL
BUN SERPL-MCNC: 11.6 MG/DL (ref 9.8–20.1)
CALCIUM SERPL-MCNC: 8.8 MG/DL (ref 8.4–10.2)
CHLORIDE SERPL-SCNC: 109 MMOL/L (ref 98–111)
CO2 SERPL-SCNC: 26 MMOL/L (ref 23–31)
CREAT SERPL-MCNC: 0.8 MG/DL (ref 0.55–1.02)
CREAT/UREA NIT SERPL: 15
EOSINOPHIL # BLD AUTO: 0.13 X10(3)/MCL (ref 0–0.9)
EOSINOPHIL NFR BLD AUTO: 2.4 %
ERYTHROCYTE [DISTWIDTH] IN BLOOD BY AUTOMATED COUNT: 14.3 % (ref 11.5–17)
GFR SERPLBLD CREATININE-BSD FMLA CKD-EPI: >60 ML/MIN/1.73/M2
GLOBULIN SER-MCNC: 3.9 GM/DL (ref 2.4–3.5)
GLUCOSE SERPL-MCNC: 87 MG/DL (ref 75–121)
HCT VFR BLD AUTO: 35 % (ref 37–47)
HGB BLD-MCNC: 11.3 G/DL (ref 12–16)
IMM GRANULOCYTES # BLD AUTO: 0.02 X10(3)/MCL (ref 0–0.04)
IMM GRANULOCYTES NFR BLD AUTO: 0.4 %
LDH SERPL-CCNC: 296 U/L (ref 125–220)
LYMPHOCYTES # BLD AUTO: 1.01 X10(3)/MCL (ref 0.6–4.6)
LYMPHOCYTES NFR BLD AUTO: 18.6 %
MCH RBC QN AUTO: 30.1 PG (ref 27–31)
MCHC RBC AUTO-ENTMCNC: 32.3 G/DL (ref 33–36)
MCV RBC AUTO: 93.3 FL (ref 80–94)
MONOCYTES # BLD AUTO: 0.35 X10(3)/MCL (ref 0.1–1.3)
MONOCYTES NFR BLD AUTO: 6.4 %
NEUTROPHILS # BLD AUTO: 3.86 X10(3)/MCL (ref 2.1–9.2)
NEUTROPHILS NFR BLD AUTO: 71.1 %
PLATELET # BLD AUTO: 208 X10(3)/MCL (ref 130–400)
PMV BLD AUTO: 8.9 FL (ref 7.4–10.4)
POTASSIUM SERPL-SCNC: 4.4 MMOL/L (ref 3.5–5.1)
PROT SERPL-MCNC: 7.2 GM/DL (ref 5.8–7.6)
RBC # BLD AUTO: 3.75 X10(6)/MCL (ref 4.2–5.4)
SODIUM SERPL-SCNC: 144 MMOL/L (ref 136–145)
WBC # BLD AUTO: 5.43 X10(3)/MCL (ref 4.5–11.5)

## 2025-07-11 PROCEDURE — 83615 LACTATE (LD) (LDH) ENZYME: CPT

## 2025-07-11 PROCEDURE — 80053 COMPREHEN METABOLIC PANEL: CPT

## 2025-07-11 PROCEDURE — 36415 COLL VENOUS BLD VENIPUNCTURE: CPT

## 2025-07-11 PROCEDURE — 85025 COMPLETE CBC W/AUTO DIFF WBC: CPT

## 2025-07-11 NOTE — PROGRESS NOTES
Subjective:       Patient ID: Margo Mishra is a 89 y.o. female.    Surgeon: Dr. Lazaro Prado  Primary Care: Dr. Dorian Snow     1. 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          2. Left Breast Cancer Stage IA ([id number]) diagnosed 3/30/17  Biopsy/pathology: Left breast US-guided biopsy done 3/30/17--microinvasive solid papillary carcinoma, grade 2, size cannot be determined, DCIS present, no lymphovascular invasion, ER 95.3%, OR 60.5%, Her2 1+ by IHC, Ki67 13.6%.  Surgery/pathology:   Left breast lumpectomy and SLN biopsy done 17--solid papillary carcinoma, greatest dimension of residual tumor 0.5cm, grade 2, no lymphovascular invasion, margins clear, 1 SLN negative.  Imagin. Bilateral screening MMG 16--persistent asymmetry left breast, right breast good.   2. Left breast diagnostic MMG/US 16--Left breast 3:00 sonographic hypoechoic mass, minimally increased in size from 2015. Follow-up recommended in 6 months.  3. Attempted stereotactic biopsy done 16--patient could not lay prone.  4. Unilateral MMG and Left breast US 3/16/17--slightly irregular lesion 3:00 left breast, BIRADS 4, biopsy recommended.     DEXA:  1. 17 demonstrated osteoporosis in left hip and osteopenia to AP spine and right hip.   2. 19--AP spine -1.3 (was -1.2). Left neck -2.5, Left total -1.4. Right neck -1.7, Right total -1.6. Improved in hips and slight worsening in AP spine.   3. 21--Left neck -2.6, Left total -1.7, Right neck -2.0, Right total -1.7. Left forearm -2.4. Worsening osteoporosis/osteopenia.      Procedures:  Colonoscopy 18 Dr. Higginbotham--Decreased sphincter tone found on digital rectal exam, patent end-to-side ileo-colonic anastomosis, characterized by healthy  appearing mucosa, three  4 to 6 mm polyps in the mid sigmoid colon and in the proximal transverse colon, removed with a hot snare, resected and retrieved--pathology c/w tubular adenoma.     2. 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   Biopsy/Pathology:  1. S/p CT guided biopsy of Left paraspinal mass on 19--Hansford spindled cell neoplasm consistent with schwannoma (neurilemmoma).     Imagin. CT A/P with contrast 18--Wall thickening of the distal rectum could relate to a proctitis but correlation with endoscopy is recommended to exclude mass, complex ventral abdominal wall hernia containing noncompromised  small and large bowel loops, indeterminate cystic structure superior to the vaginal cuff, possibly adnexal. Suggest nonemergent outpatient pelvic ultrasound to further characterize, right renal lesion not convincingly a cyst. Outpatient ultrasound characterization suggested for this finding as well.  2. US retroperitoneum 18--A 2.3 cm hypoechoic area within the right kidney corresponds with the lesion seen by CT. It lacks definitive features of a cyst.  3. CT A/P w & w/o on 18--The right lower pole renal lesion remains indeterminate with equivocal enhancement but size is stable going back to 2018. Annual CT surveillance would probably be reasonable given stability over 5 months. The cystic lesion of the vaginal cuff is also stable.  4. Pelvic US on 18--Vaginal cuff cystic structure demonstrates no convincing solid component or vascularity. Size is similar to January.  5.MRI thoracic spine w/o contrast 19--Numerous lesions within the thoracic spine which do not appear expansile but which appear rather diffuse for intraosseous hemangiomata, contrast-enhanced study would be of value for further  characterization as I cannot exclude neoplastic deposits here, 2.4 cm well marginated left paraspinous mass. CT with and without contrast would be of value for further characterization.  6. CT  thoracic spine w/ and w/o contrast done 1/31/19--Enhancing left paraspinous mass which is nonspecific but could reflect neoplasia such as nerve sheath tumor, ganglioneuroma, or even metastatic deposit among other possibilities, altered bony mineralization which may reflect aggressive decreased bony mineralization with numerous hemangiomata within the thoracic spine, enlarged right thyroid lobe with nodularity. Thyroid sonography may b of benefit as clinically indicated.  7. CT of C/A/P on 2/11/19--2.5 cm x 1.5 cm pleural based paraspinous mass in the left posterior mediastinum at the T4 level which may be contiguous with the adjacent nerve root. Differential considerations include peripheral nerve sheath tumor and pleural-based metastasis. This may be further evaluated with PET scan if clinically indicated. 10 mm groundglass opacity in the left upper lobe. 3 mm groundglass opacity in the lateral segment of the right middle lobe. The appearance is not specific for early pulmonary metastasis but it cannot be excluded. Subcarinal cystlike structure which measures 15 mm in short axis and is favored to represent a pericardial reflection or trace pericardial fluid. Stable low-attenuation structures in the liver. Mild splenomegaly. 3 cm x 2 cm Bosniak type III cyst present in the lower pole the right kidney. The appearance confers a small but significant risk of malignancy and may be further evaluated with continued CT surveillance or ultrasonography if clinically indicated. Low-attenuation lesions in the thoracic spine demonstrate the characteristic CT appearance of hemangiomas. Stable sclerotic foci in the right sacral alar and the left pubic bone are nonspecific but could represent metastatic disease. Bone scan may be used for further evaluation if concern persists.  8. CT C/A/P 7/2/19--stable paraspinous mass, unchanged pulmonary nodules, favor benign etiology, no changed in right renal lower pole cystic lesion,  suggesting benign entity, diffuse osseous demineralization and regional degenerative changes, no acute or aggressive skeletal abnormality.  9. Screening MMG 6/27/22--focal asymmetries in both breasts need further evaluation.  10. Diagnostic bilateral MMG/US done 6/29/22--Irregular, mixed echogenicity mass with irregular margins in the 11:30 right breast, 8 cm from the nipple, is suspicious for malignancy, measures [id number].  Right breast ultrasound-guided biopsy is recommended. Oval, mixed echogenicity masses with circumscribed margins in the 11:00 right breast, 9 cm from the nipple, 1:00 left breast, 9 cm from the nipple, and 3:00 left breast, 7 cm from the nipple, are favored to represent fat necrosis and as such are considered probably benign.   11. PET/CT 9/8/22--Mild, sub threshold uptake at the right breast nodule, subtle subcutaneous nodular densities at the arm, chest and abdomen/pelvis are nonspecific, subtle associated FDG avidity.  For example at the right upper arm nodule max uptake is 3.  Appearance is nonspecific.  These lesions could be related to areas of fat necrosis.  However in a patient with history of extranodal lymphoma at the right breast, additional foci of lymphomatous disease could conceivably have this appearance, ovoid left thoracic paraspinal lesion compatible with biopsy-proven schwannoma. Per literature review benign schwannoma can have moderate FDG avidity similar to this lesion.  12. PET/CT 1/26/23--Grossly similar size and appearance of left upper thoracic paraspinal nodule, with slightly increased metabolic activity. No evidence of new hypermetabolic lesions through the neck, chest, abdomen, or pelvis. Previously described right upper outer breast nodule and additional visualized areas of scattered low level radiotracer activity are stable to slightly improved in the interval.  13. PET/CT on 7/26/23--No evidence of new or progressive hypermetabolic lymphadenopathy. Stable  hypermetabolic left paraspinal mass.  14. CT cervical spine on 5/7/25--No acute osseous abnormality. Multilevel cervical degeneration as detailed above.   15. CT head on 5/7/25--No acute intracranial pathology. Mild generalized brain atrophy and mild chronic small vessel ischemic changes.  16. CT of C/A/P on 5/7/25--Multilobar consolidative opacities. Correlate for clinical signs and symptoms of pneumonia. No enlarged lymph nodes by size criteria. A 2.9 cm left paraspinal nodule is unchanged compared to prior PET-CT.     Treatment history:  1. Left breast lumpectomy 4/26/17.  2. Patient declined radiation.  3. Femara 5/15/17--stopped 8/2022.  4. External beam radiation to right breast lymphoma completed 10/18/22.  5. Prolia 11/6/17--12/2023 (stopped due to being off AI, and also completed 5 years of treatment).     Treatment plan: Observation.     Chief Complaint: Follow-up (No c/o today)    HPI  Patient presents for evaluation for follow-up of lymphoma. She is doing okay. Patient is now residing in [place]. She was admitted in May with pneumonia and afib with RVR. She was started on Eliquis for stroke prophylaxis and treated with antibiotics. CT scans done did not reveal any evidence of disease. Diagnostic MMG and US scheduled for August. No B-symptoms or other issues. Reports that her RA is controlled on MTX and she continues follow-up with Dr. Selby. States she has occasional flare ups and will take Tylenol as needed. No other problems reported today.     Past Medical History:   Diagnosis Date    Hypertension       Review of patient's allergies indicates:   Allergen Reactions    Codeine      Other reaction(s): Agitation, Confusion      Current Outpatient Medications on File Prior to Visit   Medication Sig Dispense Refill    apixaban (ELIQUIS) 5 mg Tab Take 1 tablet (5 mg total) by mouth 2 (two) times daily. 60 tablet 11    aspirin (ECOTRIN) 81 MG EC tablet Take 81 mg by mouth once daily.      cholecalciferol, vitamin  D3, 125 mcg (5,000 unit) TbDL Take 1 tablet by mouth Daily.      cinacalcet (SENSIPAR) 30 MG Tab Take 30 mg by mouth daily with breakfast.      folic acid (FOLVITE) 1 MG tablet Take 1,000 mcg by mouth once daily.      gabapentin (NEURONTIN) 300 MG capsule Take 300 mg by mouth Daily.      gabapentin (NEURONTIN) 600 MG tablet Take 600 mg by mouth every evening.      methotrexate 2.5 MG Tab Take 2.5 mg by mouth every 7 days.      metoprolol succinate (TOPROL-XL) 25 MG 24 hr tablet Take 1 tablet (25 mg total) by mouth once daily. 90 tablet 3    polyvinyl alcohol, artificial tears, (LIQUIFILM TEARS) 1.4 % ophthalmic solution Place 1 drop into both eyes 2 (two) times a day.      PROAIR RESPICLICK 90 mcg/actuation inhaler 1 puff every 4 (four) hours as needed.      TRELEGY ELLIPTA 100-62.5-25 mcg DsDv Take 1 puff by mouth once daily.      albuterol-ipratropium (DUO-NEB) 2.5 mg-0.5 mg/3 mL nebulizer solution Take 3 mLs by nebulization every 8 (eight) hours. Rescue for 8 days 75 mL 0     No current facility-administered medications on file prior to visit.      Review of Systems   Constitutional:  Negative for appetite change, fatigue, fever and unexpected weight change.   HENT:  Negative for mouth sores.    Eyes: Negative.    Respiratory:  Negative for cough and shortness of breath.    Cardiovascular:  Negative for chest pain and leg swelling.   Gastrointestinal:  Negative for abdominal distention, abdominal pain, constipation, diarrhea, nausea, vomiting and reflux.   Genitourinary:  Negative for difficulty urinating, dysuria, frequency and hematuria.   Musculoskeletal:  Positive for arthralgias. Negative for back pain.        Joint deformities from RA, in a wheelchair   Integumentary:  Negative for rash.   Neurological:  Positive for weakness. Negative for headaches.   Hematological:  Negative for adenopathy.   Psychiatric/Behavioral:  Negative for sleep disturbance. The patient is not nervous/anxious.          Vitals:     07/17/25 1322   BP: 127/78   Pulse: 78   Temp: 98.5 °F (36.9 °C)     Physical Exam  Constitutional:       Appearance: Normal appearance. She is overweight.      Comments: Elderly white female   HENT:      Head: Normocephalic.      Nose: Nose normal.      Mouth/Throat:      Mouth: Mucous membranes are moist.   Eyes:      Extraocular Movements: Extraocular movements intact.      Conjunctiva/sclera: Conjunctivae normal.   Cardiovascular:      Rate and Rhythm: Normal rate and regular rhythm.   Pulmonary:      Effort: Pulmonary effort is normal.      Breath sounds: Normal breath sounds.   Chest:      Comments: Right breast with no palpable mass, left breast with lumpectomy incision healed well, no masses. No axillary adenopathy  Abdominal:      General: Abdomen is protuberant. Bowel sounds are normal. There is no distension.      Palpations: Abdomen is soft.      Tenderness: There is no abdominal tenderness.      Comments: Umbilical hernia present   Musculoskeletal:      Comments: Joint deformities in hands c/w RA changes, in a wheelchair   Lymphadenopathy:      Comments: No palpable adenopathy   Skin:     General: Skin is warm.   Neurological:      General: No focal deficit present.      Mental Status: She is alert and oriented to person, place, and time.   Psychiatric:         Mood and Affect: Mood normal.         Behavior: Behavior is cooperative.         Judgment: Judgment normal.       Lab Visit on 07/11/2025   Component Date Value    Sodium 07/11/2025 144     Potassium 07/11/2025 4.4     Chloride 07/11/2025 109     CO2 07/11/2025 26     Glucose 07/11/2025 87     Blood Urea Nitrogen 07/11/2025 11.6     Creatinine 07/11/2025 0.80     Calcium 07/11/2025 8.8     Protein Total 07/11/2025 7.2     Albumin 07/11/2025 3.3 (L)     Globulin 07/11/2025 3.9 (H)     Albumin/Globulin Ratio 07/11/2025 0.8 (L)     Bilirubin Total 07/11/2025 1.3     ALP 07/11/2025 106     ALT 07/11/2025 9     AST 07/11/2025 22     eGFR 07/11/2025  ">60     Anion Gap 07/11/2025 9.0     BUN/Creatinine Ratio 07/11/2025 15     Lactate Dehydrogenase 07/11/2025 296 (H)     WBC 07/11/2025 5.43     RBC 07/11/2025 3.75 (L)     Hgb 07/11/2025 11.3 (L)     Hct 07/11/2025 35.0 (L)     MCV 07/11/2025 93.3     MCH 07/11/2025 30.1     MCHC 07/11/2025 32.3 (L)     RDW 07/11/2025 14.3     Platelet 07/11/2025 208     MPV 07/11/2025 8.9     Neut % 07/11/2025 71.1     Lymph % 07/11/2025 18.6     Mono % 07/11/2025 6.4     Eos % 07/11/2025 2.4     Basophil % 07/11/2025 1.1     Imm Grans % 07/11/2025 0.4     Neut # 07/11/2025 3.86     Lymph # 07/11/2025 1.01     Mono # 07/11/2025 0.35     Eos # 07/11/2025 0.13     Baso # 07/11/2025 0.06     Imm Gran # 07/11/2025 0.02       Assessment:       1. B-cell lymphoma of extranodal or solid organ site    2. Severe obesity (BMI 35.0-39.9) with comorbidity    3. History of left breast cancer        Plan:       Patient with breast cancer stage IA s/p lumpectomy done 4/26/17. ER/CO positive and Her 2 negative.  Tumor was small 0.5cm although may have been larger but no way to tell since there was not a measurement from the biopsy.  Per NCCN guidelines, no chemotherapy recommended due to small size of tumor.  Radiaton offered but patient refused.    On 2/19/19 she had a CT guided left paraspinal mass with results consistent with schwannoma (neurilemomma).   Recommended referral to Neurosurgery for evaluation but she refused since back pain controlled.  Repeat CT C/A/P in 7/2019 showed stable benign pulmonary nodules, no aggressive bone lesion, stable benign parapsinous mass and stable right renal cyst. No need to repeat unless any new symptoms. Outside imaging showed stable paraspinal mass.   Recommended referral to urology for surveillance for renal cyst but she refused this as well and says it has been there for "years".  She quit smoking over 30 years ago so she does not meet criteria for LDCT.     Treatment with AI alone recommended and " patient started Femara on 5/15/17.  DEXA from 2017 showed osteoporosis. Patient started on Prolia.  DEXA continued to decline, most recent in 9/2021 was worse.  Recommended for patient to stop Femara in 5/2022, but she did not stop until 8/2022. Completed 5 years of Prolia in 12/2022 and stopped.   No calcium supplements due to primary hyperparathyroidism. She is taking high dose Vitamin D as she does not desire any surgery. Closely monitored by Dr. Escalera.     Patient diagnosed with right breast low-grade B-cell lymphoma, 8/3/22, mostly c/w marginal zone lymphoma.  She had no B-symptoms and no palpable adenopathy.   Of note, there were also multiple other additional lesions noted on MMG/US throughout both breasts that were possible additional sites of lymphoma. But did not show up on PET.  Due to her advanced age, she did not want any surgery so radiation recommend and she completed EBRT to right breast lymphoma on 10/18/22.  Faint monoclonal band on labs from Dr. Selby from 6/22/23. Repeat testing in 8/2023 showed no M-spike. K/L ratio and IgG elevated, suspected to be from RA.    Recent labs with normal WBC and platelets, mild anemia Hgb 11.3 g/dL. Normal CMP. LDH elevated, but she has no B symptoms and has known RA with occasional flare ups.   PET/CT from 7/26/23 with SUNG aside from known paraspinal mass (schwannoma), unchanged.   Patient had recent CT scans done while admitted and there were no findings of malignancy. No enlarged lymph nodes by size criteria. A 2.9 cm left paraspinal nodule is unchanged compared to prior PET-CT  MMG/US scheduled for next month.   As long as stable findings, plan to continue with annual visits with MMG only. Would not repeat PET unless any new symptoms/findings.  F/u in 1 year with repeat labs/visit.     Case previously discussed with Rheumatology Dr. Selby,and Remicade held for now. Can possibly restart at a later date if it is affecting her QOL. Okay to continue MTX.     She  has h/o colon cancer vs. dysplastic polyp in cecum in 2005--colonoscopy was in 2013 Dr. Nelson, 2 polyps removed, both tubular adenomas. Not able to get pathology records from original surgery.  Most recent colonoscopy done 1/2018 showed also polyps c/w tubular adnenomas.    All questions answered.        Honorio Vizcaino [facility]    Patient requires or will require continuous, longitudinal and active collaborative plan of care related to this patient's health condition, history of breast cancer and low-grade B-cell lymphoma. The management of which requires the direction of a provider with specialized clinical knowledge, skill and expertise.

## 2025-07-17 ENCOUNTER — OFFICE VISIT (OUTPATIENT)
Dept: HEMATOLOGY/ONCOLOGY | Facility: CLINIC | Age: OVER 89
End: 2025-07-17
Payer: MEDICARE

## 2025-07-17 VITALS
SYSTOLIC BLOOD PRESSURE: 127 MMHG | WEIGHT: 233 LBS | BODY MASS INDEX: 38.82 KG/M2 | DIASTOLIC BLOOD PRESSURE: 78 MMHG | HEART RATE: 78 BPM | OXYGEN SATURATION: 96 % | TEMPERATURE: 99 F | HEIGHT: 65 IN

## 2025-07-17 DIAGNOSIS — Z85.3 HISTORY OF LEFT BREAST CANCER: ICD-10-CM

## 2025-07-17 DIAGNOSIS — C85.19 B-CELL LYMPHOMA OF EXTRANODAL OR SOLID ORGAN SITE: Primary | ICD-10-CM

## 2025-07-17 DIAGNOSIS — E66.01 SEVERE OBESITY (BMI 35.0-39.9) WITH COMORBIDITY: ICD-10-CM

## 2025-07-17 DIAGNOSIS — R92.8 ABNORMAL MAMMOGRAM OF BOTH BREASTS: ICD-10-CM

## 2025-07-17 PROBLEM — Z17.0 MALIGNANT NEOPLASM OF UPPER-OUTER QUADRANT OF LEFT BREAST IN FEMALE, ESTROGEN RECEPTOR POSITIVE: Status: RESOLVED | Noted: 2023-08-08 | Resolved: 2025-07-17

## 2025-07-17 PROBLEM — C50.412 MALIGNANT NEOPLASM OF UPPER-OUTER QUADRANT OF LEFT BREAST IN FEMALE, ESTROGEN RECEPTOR POSITIVE: Status: RESOLVED | Noted: 2023-08-08 | Resolved: 2025-07-17

## 2025-07-17 PROCEDURE — G2211 COMPLEX E/M VISIT ADD ON: HCPCS | Mod: ,,, | Performed by: NURSE PRACTITIONER

## 2025-07-17 PROCEDURE — 99999 PR PBB SHADOW E&M-EST. PATIENT-LVL V: CPT | Mod: PBBFAC,,, | Performed by: NURSE PRACTITIONER

## 2025-07-17 PROCEDURE — 99213 OFFICE O/P EST LOW 20 MIN: CPT | Mod: S$PBB,,, | Performed by: NURSE PRACTITIONER

## 2025-07-17 PROCEDURE — 99215 OFFICE O/P EST HI 40 MIN: CPT | Mod: PBBFAC | Performed by: NURSE PRACTITIONER

## 2025-08-05 ENCOUNTER — LAB REQUISITION (OUTPATIENT)
Dept: LAB | Facility: HOSPITAL | Age: OVER 89
End: 2025-08-05
Payer: MEDICARE

## 2025-08-05 DIAGNOSIS — E55.9 VITAMIN D DEFICIENCY, UNSPECIFIED: ICD-10-CM

## 2025-08-05 LAB
25(OH)D3+25(OH)D2 SERPL-MCNC: 44 NG/ML (ref 30–80)
TSH SERPL-ACNC: 4.64 UIU/ML (ref 0.35–4.94)

## 2025-08-05 PROCEDURE — 82306 VITAMIN D 25 HYDROXY: CPT | Performed by: FAMILY MEDICINE

## 2025-08-05 PROCEDURE — 84443 ASSAY THYROID STIM HORMONE: CPT | Performed by: FAMILY MEDICINE

## 2025-08-21 ENCOUNTER — HOSPITAL ENCOUNTER (OUTPATIENT)
Dept: RADIOLOGY | Facility: HOSPITAL | Age: OVER 89
Discharge: HOME | End: 2025-08-21
Attending: INTERNAL MEDICINE
Payer: MEDICARE

## 2025-08-21 DIAGNOSIS — Z17.0 MALIGNANT NEOPLASM OF UPPER-OUTER QUADRANT OF LEFT BREAST IN FEMALE, ESTROGEN RECEPTOR POSITIVE: ICD-10-CM

## 2025-08-21 DIAGNOSIS — C50.412 MALIGNANT NEOPLASM OF UPPER-OUTER QUADRANT OF LEFT BREAST IN FEMALE, ESTROGEN RECEPTOR POSITIVE: ICD-10-CM

## 2025-08-21 PROCEDURE — 77062 BREAST TOMOSYNTHESIS BI: CPT | Mod: TC

## 2025-08-21 PROCEDURE — 76642 ULTRASOUND BREAST LIMITED: CPT | Mod: 26,50,, | Performed by: RADIOLOGY

## 2025-08-21 PROCEDURE — 76642 ULTRASOUND BREAST LIMITED: CPT | Mod: TC,50

## 2025-08-21 PROCEDURE — 77062 BREAST TOMOSYNTHESIS BI: CPT | Mod: 26,,, | Performed by: RADIOLOGY

## 2025-08-21 PROCEDURE — 77066 DX MAMMO INCL CAD BI: CPT | Mod: 26,,, | Performed by: RADIOLOGY
